# Patient Record
Sex: MALE | Race: BLACK OR AFRICAN AMERICAN | Employment: OTHER | URBAN - METROPOLITAN AREA
[De-identification: names, ages, dates, MRNs, and addresses within clinical notes are randomized per-mention and may not be internally consistent; named-entity substitution may affect disease eponyms.]

---

## 2017-04-21 DIAGNOSIS — R41.3 MEMORY DISTURBANCE: ICD-10-CM

## 2017-04-25 RX ORDER — DONEPEZIL HYDROCHLORIDE 10 MG/1
TABLET, FILM COATED ORAL
Qty: 90 TAB | Refills: 0 | Status: SHIPPED | OUTPATIENT
Start: 2017-04-25 | End: 2017-06-26 | Stop reason: SDUPTHER

## 2017-06-16 DIAGNOSIS — E11.9 TYPE 2 DIABETES MELLITUS WITHOUT COMPLICATION, WITHOUT LONG-TERM CURRENT USE OF INSULIN (HCC): ICD-10-CM

## 2017-06-19 RX ORDER — METFORMIN HYDROCHLORIDE 500 MG/1
TABLET, EXTENDED RELEASE ORAL
Qty: 180 TAB | Refills: 0 | Status: SHIPPED | OUTPATIENT
Start: 2017-06-19 | End: 2017-09-20 | Stop reason: SDUPTHER

## 2017-06-26 ENCOUNTER — OFFICE VISIT (OUTPATIENT)
Dept: FAMILY MEDICINE CLINIC | Age: 82
End: 2017-06-26

## 2017-06-26 VITALS
TEMPERATURE: 98 F | OXYGEN SATURATION: 93 % | WEIGHT: 240.5 LBS | RESPIRATION RATE: 18 BRPM | HEIGHT: 73 IN | SYSTOLIC BLOOD PRESSURE: 112 MMHG | HEART RATE: 68 BPM | DIASTOLIC BLOOD PRESSURE: 79 MMHG | BODY MASS INDEX: 31.87 KG/M2

## 2017-06-26 DIAGNOSIS — E78.00 PURE HYPERCHOLESTEROLEMIA: ICD-10-CM

## 2017-06-26 DIAGNOSIS — E66.9 OBESITY, UNSPECIFIED: ICD-10-CM

## 2017-06-26 DIAGNOSIS — M48.061 SPINAL STENOSIS OF LUMBAR REGION WITH RADICULOPATHY: ICD-10-CM

## 2017-06-26 DIAGNOSIS — M47.12 CERVICAL SPONDYLOSIS WITH MYELOPATHY AND RADICULOPATHY: ICD-10-CM

## 2017-06-26 DIAGNOSIS — R41.3 MEMORY DISTURBANCE: ICD-10-CM

## 2017-06-26 DIAGNOSIS — I25.10 CORONARY ARTERY DISEASE INVOLVING NATIVE CORONARY ARTERY OF NATIVE HEART WITHOUT ANGINA PECTORIS: ICD-10-CM

## 2017-06-26 DIAGNOSIS — Z00.00 MEDICARE ANNUAL WELLNESS VISIT, SUBSEQUENT: Primary | ICD-10-CM

## 2017-06-26 DIAGNOSIS — I10 ESSENTIAL HYPERTENSION: ICD-10-CM

## 2017-06-26 DIAGNOSIS — E11.9 TYPE 2 DIABETES MELLITUS WITHOUT COMPLICATION, WITHOUT LONG-TERM CURRENT USE OF INSULIN (HCC): ICD-10-CM

## 2017-06-26 DIAGNOSIS — Z13.31 SCREENING FOR DEPRESSION: ICD-10-CM

## 2017-06-26 DIAGNOSIS — G44.309 POST-CONCUSSION HEADACHE: ICD-10-CM

## 2017-06-26 DIAGNOSIS — M47.22 CERVICAL SPONDYLOSIS WITH MYELOPATHY AND RADICULOPATHY: ICD-10-CM

## 2017-06-26 DIAGNOSIS — D64.9 ANEMIA, UNSPECIFIED TYPE: ICD-10-CM

## 2017-06-26 DIAGNOSIS — M54.16 SPINAL STENOSIS OF LUMBAR REGION WITH RADICULOPATHY: ICD-10-CM

## 2017-06-26 DIAGNOSIS — E53.8 B12 DEFICIENCY: ICD-10-CM

## 2017-06-26 DIAGNOSIS — E55.9 VITAMIN D DEFICIENCY: ICD-10-CM

## 2017-06-26 DIAGNOSIS — Z13.39 SCREENING FOR ALCOHOLISM: ICD-10-CM

## 2017-06-26 DIAGNOSIS — Z00.00 ROUTINE GENERAL MEDICAL EXAMINATION AT A HEALTH CARE FACILITY: ICD-10-CM

## 2017-06-26 DIAGNOSIS — R51.9 CHRONIC DAILY HEADACHE: ICD-10-CM

## 2017-06-26 RX ORDER — NITROGLYCERIN 0.4 MG/1
TABLET SUBLINGUAL
Qty: 1 BOTTLE | Refills: 3 | Status: CANCELLED | OUTPATIENT
Start: 2017-06-26

## 2017-06-26 RX ORDER — DONEPEZIL HYDROCHLORIDE 10 MG/1
TABLET, FILM COATED ORAL
Qty: 90 TAB | Refills: 0 | Status: SHIPPED | OUTPATIENT
Start: 2017-06-26 | End: 2017-10-20 | Stop reason: SDUPTHER

## 2017-06-26 RX ORDER — FLUOXETINE 10 MG/1
10 CAPSULE ORAL DAILY
Qty: 30 CAP | Refills: 0 | Status: SHIPPED | OUTPATIENT
Start: 2017-06-26 | End: 2017-11-08

## 2017-06-26 NOTE — LETTER
6/27/2017 3:31 PM 
 
Mr. Lisseth [address] [phone] Dear Brunilda Dumont: 
 
Please find your most recent results below. Resulted Orders VITAMIN D, 25 HYDROXY Result Value Ref Range VITAMIN D, 25-HYDROXY 36.6 30.0 - 100.0 ng/mL Comment:  
   Vitamin D deficiency has been defined by the [facility] practice guideline as a 
level of serum 25-OH vitamin D less than 20 ng/mL (1,2). The Endocrine Society went on to further define vitamin D 
insufficiency as a level between 21 and 29 ng/mL (2). 1. IOM ([place] of Medicine). 2010. Dietary reference 
   intakes for calcium and D. [facility]: The 
   [place]. 2. Isma MF, Darrell JAIME, Melisa MORGAN, et al. 
   Evaluation, treatment, and prevention of vitamin D 
   deficiency: an Endocrine Society clinical practice 
   guideline. JCEM. 2011 Jul; 96(7):1911-30. Narrative Performed at:  [address]  [id number] : Yvrose Zavala MD, Phone:  [phone] CBC WITH AUTOMATED DIFF Result Value Ref Range WBC 6.9 3.4 - 10.8 x10E3/uL  
 RBC 4.16 4.14 - 5.80 x10E6/uL HGB 11.3 (L) 12.6 - 17.7 g/dL HCT 36.6 (L) 37.5 - 51.0 % MCV 88 79 - 97 fL  
 MCH 27.2 26.6 - 33.0 pg  
 MCHC 30.9 (L) 31.5 - 35.7 g/dL  
 RDW 14.2 12.3 - 15.4 % PLATELET 945 318 - 614 x10E3/uL NEUTROPHILS 60 % Lymphocytes 29 % MONOCYTES 8 % EOSINOPHILS 3 % BASOPHILS 0 %  
 ABS. NEUTROPHILS 4.1 1.4 - 7.0 x10E3/uL Abs Lymphocytes 2.0 0.7 - 3.1 x10E3/uL  
 ABS. MONOCYTES 0.6 0.1 - 0.9 x10E3/uL  
 ABS. EOSINOPHILS 0.2 0.0 - 0.4 x10E3/uL  
 ABS. BASOPHILS 0.0 0.0 - 0.2 x10E3/uL IMMATURE GRANULOCYTES 0 %  
 ABS. IMM. GRANS. 0.0 0.0 - 0.1 x10E3/uL Narrative Performed at:  [address]  [id number] : Yvrose Zavala MD, Phone:  [phone] TSH 3RD GENERATION Result Value Ref Range TSH 1.370 0.450 - 4.500 uIU/mL Narrative Performed at:  [address]  [id number] : Lindsay Su MD, Phone:  [phone] METABOLIC PANEL, COMPREHENSIVE Result Value Ref Range Glucose 139 (H) 65 - 99 mg/dL BUN 25 8 - 27 mg/dL Creatinine 1.53 (H) 0.76 - 1.27 mg/dL GFR est non-AA 42 (L) >59 mL/min/1.73 GFR est AA 49 (L) >59 mL/min/1.73  
 BUN/Creatinine ratio 16 10 - 24 Sodium 139 134 - 144 mmol/L Potassium 5.4 (H) 3.5 - 5.2 mmol/L Chloride 102 96 - 106 mmol/L  
 CO2 21 18 - 29 mmol/L Calcium 9.3 8.6 - 10.2 mg/dL Protein, total 7.4 6.0 - 8.5 g/dL Albumin 4.4 3.5 - 4.7 g/dL GLOBULIN, TOTAL 3.0 1.5 - 4.5 g/dL A-G Ratio 1.5 1.2 - 2.2 Bilirubin, total 0.3 0.0 - 1.2 mg/dL Alk. phosphatase 80 39 - 117 IU/L  
 AST (SGOT) 18 0 - 40 IU/L  
 ALT (SGPT) 13 0 - 44 IU/L Narrative Performed at:  [address]  [id number] : Lindsay Su MD, Phone:  [phone] LIPID PANEL Result Value Ref Range Cholesterol, total 153 100 - 199 mg/dL Triglyceride 150 (H) 0 - 149 mg/dL HDL Cholesterol 48 >39 mg/dL VLDL, calculated 30 5 - 40 mg/dL LDL, calculated 75 0 - 99 mg/dL Narrative Performed at:  [address]  [id number] : Lindsay Su MD, Phone:  [phone] VITAMIN B12 Result Value Ref Range Vitamin B12 1049 (H) 211 - 946 pg/mL Narrative Performed at:  [address]  [id number] : Lindsay Su MD, Phone:  [phone] CVD REPORT Result Value Ref Range INTERPRETATION NTAP   
 PDF IMAGE Not applicable Narrative Performed at:  [address] [place] [address]  [id number] : Jaspreet Flores PhD, Phone:  [phone] CKD REPORT Result Value Ref Range Interpretation Note Comment:  
   Medical Director's Note: This is an amended report on 
6/27/2017. The following panels were previously not 
reported: Albumin: Creatinine Ratio. Please review this 
report in its entirety, since changes may affect result 
interpretation(s) and/or treatment/follow-up suggestions. Supplement report is available. Narrative Performed at:  [address] [place] [address]  [id number] : Jaspreet Flores PhD, Phone:  [phone] DIABETES PATIENT EDUCATION Result Value Ref Range PDF Image Not applicable Narrative Performed at:  [address] [place] [address]  [id number] : Jaspreet Flores PhD, Phone:  [phone]

## 2017-06-26 NOTE — PROGRESS NOTES
Sees card this month. Needs NTG refilled. Uses once a month per pt. Here for management of chronic medical problems which include AODM controlled by prev A1c, Memory dist on Aricept, CAD followed by card, post concussive HA, mood disorder, HTN, anemia, hyperlip, B12 def, obesity. Visit Vitals    /79 (BP 1 Location: Right arm, BP Patient Position: Sitting)    Pulse 68    Temp 98 °F (36.7 °C) (Oral)    Resp 18    Ht 6' 1\" (1.854 m)    Wt 240 lb 8 oz (109.1 kg)    SpO2 93%    BMI 31.73 kg/m2       Patient alert and cooperative. Reviewed above. Plan:  1. Annual labs ordered. 2. Given refill on Aricept for three months pending neurologist evaluation. 3. Will give one month trial of Prozac 10 mg for possible mood affecting mental status. 4. Follow up with cardiologist to get nitroglycerin reordered. 5. Recheck here in three months for diabetic recheck, follow otherwise prn. Reviewed [address]. Agree with A&P as outlined.

## 2017-06-26 NOTE — LETTER
6/28/2017 12:03 PM 
 
Mr. Bran Pritchard 7 [id number] Dear Zofia Zamora: 
 
Please find your most recent results below. Resulted Orders VITAMIN D, 25 HYDROXY Result Value Ref Range VITAMIN D, 25-HYDROXY 36.6 30.0 - 100.0 ng/mL Comment:  
   Vitamin D deficiency has been defined by the [facility] practice guideline as a 
level of serum 25-OH vitamin D less than 20 ng/mL (1,2). The Endocrine Society went on to further define vitamin D 
insufficiency as a level between 21 and 29 ng/mL (2). 1. IOM ([place] of Medicine). 2010. Dietary reference 
   intakes for calcium and D. 430 [facility]: The 
   [place]. 2. Isma MF, Darrell JAIME, Melisa MORGAN, et al. 
   Evaluation, treatment, and prevention of vitamin D 
   deficiency: an Endocrine Society clinical practice 
   guideline. JCEM. 2011 Jul; 96(7):1911-30. Narrative Performed at:  [address]  [id number] : Chris Robert MD, Phone:  [phone] MICROALBUMIN, UR, RAND W/ MICROALBUMIN/CREA RATIO Result Value Ref Range Creatinine, urine 187.1 Not Estab. mg/dL Microalbumin, urine 6.8 Not Estab. ug/mL Microalb/Creat ratio (ug/mg creat.) 3.6 0.0 - 30.0 mg/g creat Narrative Performed at:  [address]  [id number] : Chris Robert MD, Phone:  [phone] CBC WITH AUTOMATED DIFF Result Value Ref Range WBC 6.9 3.4 - 10.8 x10E3/uL  
 RBC 4.16 4.14 - 5.80 x10E6/uL HGB 11.3 (L) 12.6 - 17.7 g/dL HCT 36.6 (L) 37.5 - 51.0 % MCV 88 79 - 97 fL  
 MCH 27.2 26.6 - 33.0 pg  
 MCHC 30.9 (L) 31.5 - 35.7 g/dL  
 RDW 14.2 12.3 - 15.4 % PLATELET 249 816 - 380 x10E3/uL NEUTROPHILS 60 % Lymphocytes 29 % MONOCYTES 8 % EOSINOPHILS 3 % BASOPHILS 0 %  
 ABS. NEUTROPHILS 4.1 1.4 - 7.0 x10E3/uL Abs Lymphocytes 2.0 0.7 - 3.1 x10E3/uL  
 ABS. MONOCYTES 0.6 0.1 - 0.9 x10E3/uL  
 ABS. EOSINOPHILS 0.2 0.0 - 0.4 x10E3/uL  
 ABS. BASOPHILS 0.0 0.0 - 0.2 x10E3/uL IMMATURE GRANULOCYTES 0 %  
 ABS. IMM. GRANS. 0.0 0.0 - 0.1 x10E3/uL Narrative Performed at:  [address]  [id number] : Yvrose Zavala MD, Phone:  [phone] URINALYSIS W/ RFLX MICROSCOPIC Result Value Ref Range Specific Gravity 1.019 1.005 - 1.030  
 pH (UA) 5.5 5.0 - 7.5 Color Yellow Yellow Appearance Clear Clear Leukocyte Esterase Negative Negative Protein Negative Negative/Trace Glucose Negative Negative Ketone Negative Negative Blood Negative Negative Bilirubin Negative Negative Urobilinogen 0.2 0.2 - 1.0 mg/dL Nitrites Negative Negative Microscopic Examination Comment Comment:  
   Microscopic not indicated and not performed. Narrative Performed at:  [address]  [id number] : Yvrose Zavala MD, Phone:  [phone] TSH 3RD GENERATION Result Value Ref Range TSH 1.370 0.450 - 4.500 uIU/mL Narrative Performed at:  [address]  [id number] : Yvrose Zavala MD, Phone:  [phone] METABOLIC PANEL, COMPREHENSIVE Result Value Ref Range Glucose 139 (H) 65 - 99 mg/dL BUN 25 8 - 27 mg/dL Creatinine 1.53 (H) 0.76 - 1.27 mg/dL GFR est non-AA 42 (L) >59 mL/min/1.73 GFR est AA 49 (L) >59 mL/min/1.73  
 BUN/Creatinine ratio 16 10 - 24 Sodium 139 134 - 144 mmol/L Potassium 5.4 (H) 3.5 - 5.2 mmol/L Chloride 102 96 - 106 mmol/L  
 CO2 21 18 - 29 mmol/L Calcium 9.3 8.6 - 10.2 mg/dL Protein, total 7.4 6.0 - 8.5 g/dL Albumin 4.4 3.5 - 4.7 g/dL GLOBULIN, TOTAL 3.0 1.5 - 4.5 g/dL A-G Ratio 1.5 1.2 - 2.2 Bilirubin, total 0.3 0.0 - 1.2 mg/dL Alk.  phosphatase 80 39 - 117 IU/L  
 AST (SGOT) 18 0 - 40 IU/L  
 ALT (SGPT) 13 0 - 44 IU/L Narrative Performed at:  [address]  [id number] : Johanny Herndon MD, Phone:  [phone] LIPID PANEL Result Value Ref Range Cholesterol, total 153 100 - 199 mg/dL Triglyceride 150 (H) 0 - 149 mg/dL HDL Cholesterol 48 >39 mg/dL VLDL, calculated 30 5 - 40 mg/dL LDL, calculated 75 0 - 99 mg/dL Narrative Performed at:  [address]  [id number] : Johanny Herndon MD, Phone:  [phone] VITAMIN B12 Result Value Ref Range Vitamin B12 1049 (H) 211 - 946 pg/mL Narrative Performed at:  [address]  [id number] : Johanny Herndon MD, Phone:  [phone] CVD REPORT Result Value Ref Range INTERPRETATION NTAP   
 PDF IMAGE Not applicable Narrative Performed at:  [facility] [place]  [id number] : Esther Smith PhD, Phone:  [phone] CKD REPORT Result Value Ref Range Interpretation Note Comment:  
   Medical Director's Note: This is an amended report on 
6/27/2017. The following panels were previously not 
reported: Albumin: Creatinine Ratio. Please review this 
report in its entirety, since changes may affect result 
interpretation(s) and/or treatment/follow-up suggestions. Supplement report is available. Narrative Performed at:  [address] [place] [address]  [id number] : Esther Smith PhD, Phone:  [phone] DIABETES PATIENT EDUCATION Result Value Ref Range PDF Image Not applicable Narrative Performed at:  [address] [place] [address]  [id number] : Esther Smith PhD, Phone:  [phone] DIABETES PATIENT EDUCATION Result Value Ref Range PDF Image Not applicable Narrative Performed at:  [address] [place] [address]  [id number] : Santana Villa PhD, Phone:  [phone]

## 2017-06-26 NOTE — PATIENT INSTRUCTIONS
Medicare Part B Preventive Services Limitations Recommendation Scheduled   Cardiovascular Screening Blood Tests (every 5 years)  Total cholesterol, HDL, Triglycerides Order as a panel if possible Last: 5/25/16    Every Year Ordered today   Diabetes Self-Management Training (DSMT) (no USPSTF recommendation) Requires referral by treating physician for patient with diabetes or renal disease. 10 hours of initial DSMT session of no less than 30 minutes each in a continuous 12-month period. 2 hours of follow-up DSMT in subsequent years. Last: Talk to Dr. Jacqueline Mc if you are interested in a refresher course. Glaucoma Screening (no USPSTF recommendation) Diabetes mellitus, family history, , age 48 or over,  American, age 72 or over Last: 7/18/16    Every year Due July 2017   Seasonal Influenza Vaccination (annually)    Every Fall Please get one this Fall. Shingles Vaccination  A shingles vaccine is recommended once in a lifetime after age 61 Patient thinks he has gotten. Check your paperwork at home from [facility].    Pneumococcal Vaccination (once after 65)  Last:  Pneumovax: 1/1/08 and 10/30/14    Prevnar-13: 10/3014 Completed series

## 2017-06-26 NOTE — PROGRESS NOTES
Chief Complaint   Patient presents with    Follow-up    Medication Evaluation     Aricept does not seem to be helping. Wife wondered if he could have an increase in dosage. Fasting for labs. 1. Have you been to the ER, urgent care clinic since your last visit? Hospitalized since your last visit? No    2. Have you seen or consulted any other health care providers outside of the [address] [place] [address] since your last visit? Include any pap smears or colon screening. Yes When: 3/20/17 Where: [facility] Reason for visit: Lab and questions. 4/17/17 Dr. Nelson Ferguson did hearing test.    The patient was counseled on the dangers of tobacco use, and Patient is a non smoker. Reviewed strategies to maximize success, including Continue not to smoke. I have reviewed Health Maintenance with the patient and updated. Has an Guillermo Martinez. Here on 6/28/17.

## 2017-06-26 NOTE — MR AVS SNAPSHOT
Visit Information Date & Time Provider Department Dept. Phone Encounter #  
 6/26/2017  9:30 AM Gretchen Benjamin MD [facility] [phone] [id number] Follow-up Instructions Return in about 3 months (around 9/26/2017) for Recheck DM. Your Appointments 7/12/2018 10:40 AM  
ESTABLISHED PATIENT with Miriam Pichardo MD  
[facility] ([address]) Appt Note: follow up  
 [address]  [place] [address], [facility] [address] [address] [facility] [address] Upcoming Health Maintenance Date Due ZOSTER VACCINE AGE 60> 10/7/1995 MICROALBUMIN Q1 5/25/2017 LIPID PANEL Q1 5/25/2017 MEDICARE YEARLY EXAM 5/26/2017 EYE EXAM RETINAL OR DILATED Q1 7/26/2017* INFLUENZA AGE 9 TO ADULT 8/1/2017 HEMOGLOBIN A1C Q6M 9/20/2017 FOOT EXAM Q1 12/5/2017 GLAUCOMA SCREENING Q2Y 7/18/2018 DTaP/Tdap/Td series (2 - Td) 4/10/2024 *Topic was postponed. The date shown is not the original due date. Allergies as of 6/26/2017  Review Complete On: 6/26/2017 By: Marti Diamond PHARMD  
 No Known Allergies Current Immunizations  Reviewed on 6/26/2017 Name Date Influenza High Dose Vaccine PF 10/12/2015, 10/15/2014 Influenza Vaccine 10/15/2013 Influenza Vaccine Split 10/1/2012 Pneumococcal Conjugate (PCV-13) 10/30/2014 Pneumococcal Polysaccharide (PPSV-23) 10/1/2012, 1/1/2008 Tdap 4/10/2014 Reviewed by Marti Diamond PHARMD on 6/26/2017 at 10:36 AM  
You Were Diagnosed With   
  
 Codes Comments Medicare annual wellness visit, subsequent    -  Primary ICD-10-CM: Z00.00 ICD-9-CM: V70.0 Type 2 diabetes mellitus without complication, without long-term current use of insulin (HCC)     ICD-10-CM: E11.9 ICD-9-CM: 250.00 Post-concussion headache     ICD-10-CM: G62.577 ICD-9-CM: 339.20 Cervical spondylosis with myelopathy and radiculopathy     ICD-10-CM: M47.12, M47.22 
ICD-9-CM: 721.1 Spinal stenosis of lumbar region with radiculopathy     ICD-10-CM: M48.06, M54.16 
ICD-9-CM: 724.02, 724.4 Chronic daily headache     ICD-10-CM: R51 ICD-9-CM: 784.0 Obesity, unspecified     ICD-10-CM: E66.9 ICD-9-CM: 278.00 Essential hypertension     ICD-10-CM: I10 
ICD-9-CM: 401.9 Anemia, unspecified type     ICD-10-CM: D64.9 ICD-9-CM: 285.9 Pure hypercholesterolemia     ICD-10-CM: E78.00 ICD-9-CM: 272.0 Coronary artery disease involving native coronary artery of native heart without angina pectoris     ICD-10-CM: I25.10 ICD-9-CM: 414.01 Vitamin D deficiency     ICD-10-CM: E55.9 ICD-9-CM: 268.9 B12 deficiency     ICD-10-CM: E53.8 ICD-9-CM: 266.2 Memory disturbance     ICD-10-CM: R41.3 ICD-9-CM: 780.93 Vitals BP Pulse Temp Resp Height(growth percentile) Weight(growth percentile) 112/79 (BP 1 Location: Right arm, BP Patient Position: Sitting) 68 98 °F (36.7 °C) (Oral) 18 6' 1\" (1.854 m) 240 lb 8 oz (109.1 kg) SpO2 BMI Smoking Status 93% 31.73 kg/m2 Former Smoker Vitals History BMI and BSA Data Body Mass Index Body Surface Area 31.73 kg/m 2 2.37 m 2 Preferred Pharmacy Pharmacy Name Phone [facility] [phone] Your Updated Medication List  
  
   
This list is accurate as of: 6/26/17 10:39 AM.  Always use your most recent med list.  
  
  
  
  
 aspirin 81 mg tablet Take 162 mg by mouth daily. Taking 1 tablet daily. cholecalciferol (VITAMIN D3) 5,000 unit Tab tablet Commonly known as:  VITAMIN D3 Take 5,000 Units by mouth daily. donepezil 10 mg tablet Commonly known as:  ARICEPT  
TAKE ONE TABLET BY MOUTH ONCE NIGHTLY FLUoxetine 10 mg capsule Commonly known as:  PROzac  
 Take 1 Cap by mouth daily. glucose blood VI test strips strip Commonly known as:  ONETOUCH ULTRA TEST  
TEST BLOOD SUGAR 2 TO 3 TIMES A WEEK  
  
 lisinopril 5 mg tablet Commonly known as:  PRINIVIL, ZESTRIL  
TAKE ONE TABLET BY MOUTH DAILY FOR KIDNEY PROTECTION  
  
 metFORMIN  mg tablet Commonly known as:  GLUCOPHAGE XR  
TAKE TWO TABLETS BY MOUTH EVERY EVENING WITH SUPPER OR EVERY NIGHT AT BEDTIME  
  
 nitroglycerin 0.4 mg SL tablet Commonly known as:  NITROSTAT  
DISSOLVE 1 TAB UNDER TONGUE FOR CHEST PAIN - IF PAIN REMAINS AFTER 5 MIN, CALL 911 AND REPEAT DOSE. MAX 3 TABS IN 15 MINUTES  
  
 pravastatin 40 mg tablet Commonly known as:  PRAVACHOL  
TAKE ONE TABLET BY MOUTH AT BEDTIME  
  
 VITAMIN B COMPLEX NO.12-NIACIN PO Take 1 Tab by mouth daily. ZyrTEC 10 mg tablet Generic drug:  cetirizine Take 10 mg by mouth daily. Prescriptions Sent to Pharmacy Refills FLUoxetine (PROZAC) 10 mg capsule 0 Sig: Take 1 Cap by mouth daily. Class: Normal  
 Pharmacy: [facility] Ph #: [phone] Route: Oral  
 donepezil (ARICEPT) 10 mg tablet 0 Sig: TAKE ONE TABLET BY MOUTH ONCE NIGHTLY Class: Normal  
 Pharmacy: [facility] Ph #: [phone] We Performed the Following CBC WITH AUTOMATED DIFF [36511 CPT(R)] LIPID PANEL [46805 CPT(R)] METABOLIC PANEL, COMPREHENSIVE [53578 CPT(R)] MICROALBUMIN, UR, RAND W/ MICROALBUMIN/CREA RATIO [id number] CPT(R)] REFERRAL TO NEUROLOGY [UTW69 Custom] Comments:  
 [address] [facility] [address] [facility], [facility] [address] TSH 3RD GENERATION [44735 CPT(R)] URINALYSIS W/ RFLX MICROSCOPIC [58564 CPT(R)] VITAMIN B12 [id number] CPT(R)] VITAMIN D, 25 HYDROXY [id number] CPT(R)] Follow-up Instructions Return in about 3 months (around 9/26/2017) for Recheck DM. Referral Information Referral ID Referred By Referred To  
  
 [id number] MARCELINO, [address] [facility]   [address] [address], [address] [place] [address] Maria Esther Phone: [phone] Fax: [phone] Visits Status Start Date End Date 1 New Request 6/26/17 6/26/18 If your referral has a status of pending review or denied, additional information will be sent to support the outcome of this decision. Patient Instructions Medicare Part B Preventive Services Limitations Recommendation Scheduled Cardiovascular Screening Blood Tests (every 5 years) Total cholesterol, HDL, Triglycerides Order as a panel if possible Last: 5/25/16 Every Year Ordered today Diabetes Self-Management Training (DSMT) (no USPSTF recommendation) Requires referral by treating physician for patient with diabetes or renal disease. 10 hours of initial DSMT session of no less than 30 minutes each in a continuous 12-month period. 2 hours of follow-up DSMT in subsequent years. Last: Talk to Dr. Narayan Pena if you are interested in a refresher course. Glaucoma Screening (no USPSTF recommendation) Diabetes mellitus, family history, , age 48 or over,  American, age 72 or over Last: 7/18/16 Every year Due July 2017 Seasonal Influenza Vaccination (annually) Every Fall Please get one this Fall. Shingles Vaccination  A shingles vaccine is recommended once in a lifetime after age 61 Patient thinks he has gotten. Check your paperwork at home from [facility]. Pneumococcal Vaccination (once after 65)  Last: 
Pneumovax: 1/1/08 and 10/30/14 Prevnar-13: 10/3014 Completed series Introducing [facility]! Evan Tavera introduces [employer] patient portal. Now you can access parts of your medical record, email your doctor's office, and request medication refills online.    
 
1. In your internet browser, go to [email or web address] 2. Click on the First Time User? Click Here link in the Sign In box. You will see the New Member Sign Up page. 3. Enter your [employer] Access Code exactly as it appears below. You will not need to use this code after youve completed the sign-up process. If you do not sign up before the expiration date, you must request a new code. · [employer] Access Code: [email or web address] Expires: 9/24/2017 10:19 AM 
 
4. Enter the last four digits of your Social Security Number (xxxx) and Date of Birth (mm/dd/yyyy) as indicated and click Submit. You will be taken to the next sign-up page. 5. Create a [facility] ID. This will be your [employer] login ID and cannot be changed, so think of one that is secure and easy to remember. 6. Create a [employer] password. You can change your password at any time. 7. Enter your Password Reset Question and Answer. This can be used at a later time if you forget your password. 8. Enter your e-mail address. You will receive e-mail notification when new information is available in [address] 9. Click Sign Up. You can now view and download portions of your medical record. 10. Click the Download Summary menu link to download a portable copy of your medical information. If you have questions, please visit the Frequently Asked Questions section of the [employer] website. Remember, [employer] is NOT to be used for urgent needs. For medical emergencies, dial 911. Now available from your iPhone and Android! Please provide this summary of care documentation to your next provider. Your primary care clinician is listed as [id number] If you have any questions after today's visit, please call [phone].

## 2017-06-26 NOTE — PROGRESS NOTES
Dr. Jacqueline Mc referred [facility], 1935, a 80 y.o. male for a Medicare Annual Wellness Visit (AWV). This is a Subsequent Medicare Annual Wellness Visit providing Personalized Prevention Plan Services (PPPS) (Performed 12 months after initial AWV and PPPS )    I have reviewed the patient's medical history in detail and updated the computerized patient record.      History     Past Medical History:   Diagnosis Date    Advance directive discussed with patient 05/13/2015,05/25/2016    Anemia 6/16/2009    Anemia NEC     Arthritis 6/16/2009    BPH (benign prostatic hyperplasia)     CAD (coronary artery disease)     CAD (coronary artery disease) of artery bypass graft 6/16/2009    dr faye Travis or associate Dr Chika Calderon Carotid stenosis     Chronic pain     back    Concussion syndrome     DM (diabetes mellitus) ([facility] [address]     Edema     Gait disturbance     Glaucoma suspect 12/04/14    [facility] notes [place]    Headache     Headache due to trauma 1/2012    hit by a device on a door    Hearing difficulty     Hypercholesterolemia     Hypertension 6/16/2009    Kidney stone 7/10    dr Kiera styles [facility] lith[facility]ri[facility]    Microalbuminuria 2/2013    Mild memory disturbance Yuliya Dumas also    neuro associates Alisia Mckenna     Nasal septal deviation     Pain in back 6/16/2009    Screening for diabetic retinopathy 7/18/16    exam neg for retinopathy Stiven'/Kaushik    Sleep apnea     Vitamin D deficiency       Past Surgical History:   Procedure Laterality Date    CARDIAC SURG PROCEDURE UNLIST      stent 2008    ENDOSCOPY, COLON, DIAGNOSTIC      in [place]- pt thinks in 2006    HX BACK SURGERY      HX CORONARY STENT PLACEMENT      HX HEART CATHETERIZATION      HX HEENT      left cataract    HX ORTHOPAEDIC      back multiple times    HX OTHER SURGICAL  12/2012    temporal  artery bx    HX OTHER SURGICAL  4/23/14    lower ext doppler    HX OTHER SURGICAL  4/1/16    echo report EF 60%    HX PTCA       Current Outpatient Prescriptions   Medication Sig Dispense Refill    FLUoxetine (PROZAC) 10 mg capsule Take 1 Cap by mouth daily. 30 Cap 0    donepezil (ARICEPT) 10 mg tablet TAKE ONE TABLET BY MOUTH ONCE NIGHTLY 90 Tab 0    metFORMIN ER (GLUCOPHAGE XR) 500 mg tablet TAKE TWO TABLETS BY MOUTH EVERY EVENING WITH SUPPER OR EVERY NIGHT AT BEDTIME 180 Tab 0    lisinopril (PRINIVIL, ZESTRIL) 5 mg tablet TAKE ONE TABLET BY MOUTH DAILY FOR KIDNEY PROTECTION 90 Tab 1    pravastatin (PRAVACHOL) 40 mg tablet TAKE ONE TABLET BY MOUTH AT BEDTIME 90 Tab 1    nitroglycerin (NITROSTAT) 0.4 mg SL tablet DISSOLVE 1 TAB UNDER TONGUE FOR CHEST PAIN - IF PAIN REMAINS AFTER 5 MIN, CALL 911 AND REPEAT DOSE. MAX 3 TABS IN 15 MINUTES 1 Bottle 3    VITAMIN B COMPLEX NO.12-NIACIN PO Take 1 Tab by mouth daily.  glucose blood VI test strips (ONETOUCH ULTRA TEST) strip TEST BLOOD SUGAR 2 TO 3 TIMES A WEEK 50 Strip 5    cholecalciferol, VITAMIN D3, (VITAMIN D3) 5,000 unit tab tablet Take 5,000 Units by mouth daily.  cetirizine (ZYRTEC) 10 mg tablet Take 10 mg by mouth daily.  aspirin 81 mg tablet Take 162 mg by mouth daily. Taking 1 tablet daily.        No Known Allergies  Family History   Problem Relation Age of Onset    Heart Disease Mother     Heart Disease Father     Cancer Sister     Dementia Sister     Cancer Brother      leukemia    Arthritis-osteo Brother     Migraines Brother     Migraines Child     Arthritis-osteo Child      Social History   Substance Use Topics    Smoking status: Former Smoker     Packs/day: 0.50     Years: 30.00     Types: Cigarettes     Quit date: 7/1/2014    Smokeless tobacco: Never Used    Alcohol use No      Comment: occassionally     Patient Active Problem List   Diagnosis Code    Hypertension I10    Arthritis M19.90    Anemia D64.9    Pure hypercholesterolemia E78.00    CAD (coronary artery disease) I25.10    Carotid stenosis, left I65.22    Nocturia R35.1    Hearing loss H91.90    Vitamin D deficiency E55.9    Sleep apnea G47.30    Obesity, unspecified E66.9    Edema of both legs R60.0    Memory disturbance R41.3    Gait disturbance R26.9    Diabetes mellitus (HCC) E11.9    B12 deficiency E53.8    Cerebrovascular disease, unspecified I67.9    Post-concussion headache G44.309    Cervical post-laminectomy syndrome M96.1    Cervical spondylosis with myelopathy and radiculopathy M47.12, M47.22    Spinal stenosis of lumbar region with radiculopathy M48.06, M54.16    Diabetic peripheral neuropathy associated with type 2 diabetes mellitus (HCC) E11.42    History of cerebral infarction Z86.73       Depression Risk Factor Screening:     PHQ over the last two weeks 6/26/2017   Little interest or pleasure in doing things Nearly every day   Feeling down, depressed or hopeless Not at all   Total Score PHQ 2 3   Trouble falling or staying asleep, or sleeping too much Nearly every day   Feeling tired or having little energy Nearly every day   Poor appetite or overeating Nearly every day   Feeling bad about yourself - or that you are a failure or have let yourself or your family down Not at all   Trouble concentrating on things such as school, work, reading or watching TV Nearly every day   Moving or speaking so slowly that other people could have noticed; or the opposite being so fidgety that others notice Nearly every day   Thoughts of being better off dead, or hurting yourself in some way Not at all   PHQ 9 Score 18     Alcohol Risk Factor Screening: On any occasion during the past 3 months, have you had more than 4 drinks containing alcohol? No    Do you average more than 14 drinks per week? No        Functional Ability and Level of Safety:     Hearing Loss   Patient has a diagnosis and is currently seeing a provider. No problems with hearing noted during visit    Activities of Daily Living   Partial assistance.    Requires assistance with: ambulation, uses a chair lift in his house when going up steps. His wife helps him with directions when he is driving, goes shopping with him, takes care of the finances, and administers his medications. ADL Assessment 6/26/2017   Feeding yourself No Help Needed   Getting from bed to chair No Help Needed   Getting dressed No Help Needed   Bathing or showering No Help Needed   Walk across the room (includes cane/walker) No Help Needed   Using the telphone Help Needed   Taking your medications Help Needed   Preparing meals Help Needed   Managing money (expenses/bills) Help Needed   Moderately strenuous housework (laundry) Help Needed   Shopping for personal items (toiletries/medicines) Help Needed   Shopping for groceries Help Needed   Driving Help Needed   Climbing a flight of stairs Help Needed   Getting to places beyond walking distances Help Needed       Fall Risk   Fall Risk Assessment, last 12 mths 6/26/2017   Able to walk? Yes   Fall in past 12 months? No   Fall with injury? -   Number of falls in past 12 months -     Abuse Screen   Patient is not abused    Review of Systems   Not required    Physical Examination     Evaluation of Cognitive Function:  Mood/affect:  happy  Appearance: age appropriate and casually dressed  Family member/caregiver input: Wife was present and added information regarding ADLs and medications. No exam performed today, not required for medicare annual wellness visit.     Patient Care Team:  Josep Simons MD as PCP - Miguel Kumar MD (Cardiology)  Dyan Severance, MD (Ophthalmology)  Brian Childs MD (Otolaryngology)  Yeni Summers DPM as Physician (Podiatry)  Carrie Espinoza, PHD as Physician (Psychology)    Advice/Referrals/Counseling   Education and counseling provided:  Are appropriate based on today's review and evaluation  End-of-Life planning (with patient's consent)  Pneumococcal Vaccine  Influenza Vaccine  Cardiovascular screening blood test  Screening for glaucoma  Diabetes outpatient self-management training services  Shingles Vaccine    Assessment/Plan       ICD-10-CM ICD-9-CM    1. Medicare annual wellness visit, subsequent Z00.00 V70.0    2. Type 2 diabetes mellitus without complication, without long-term current use of insulin (HCC) E11.9 250.00 MICROALBUMIN, UR, RAND W/ MICROALBUMIN/CREA RATIO      URINALYSIS W/ RFLX MICROSCOPIC      TSH 3RD GENERATION      METABOLIC PANEL, COMPREHENSIVE      LIPID PANEL   3. Post-concussion headache G44.309 339.20 REFERRAL TO NEUROLOGY   4. Cervical spondylosis with myelopathy and radiculopathy M47.12 721.1     M47.22     5. Spinal stenosis of lumbar region with radiculopathy M48.06 724.02     M54.16 724.4    6. Chronic daily headache R51 784.0 REFERRAL TO NEUROLOGY   7. Obesity, unspecified E66.9 278.00 TSH 3RD GENERATION      METABOLIC PANEL, COMPREHENSIVE      LIPID PANEL   8. Essential hypertension I10 401.9 MICROALBUMIN, UR, RAND W/ MICROALBUMIN/CREA RATIO      CBC WITH AUTOMATED DIFF      URINALYSIS W/ RFLX MICROSCOPIC      TSH 3RD GENERATION      METABOLIC PANEL, COMPREHENSIVE      LIPID PANEL   9. Anemia, unspecified type D64.9 285.9 CBC WITH AUTOMATED DIFF   10. Pure hypercholesterolemia E78.00 272.0 LIPID PANEL   11. Coronary artery disease involving native coronary artery of native heart without angina pectoris I25.10 414.01 LIPID PANEL   12. Vitamin D deficiency E55.9 268.9 VITAMIN D, 25 HYDROXY   13. B12 deficiency E53.8 266.2 VITAMIN B12   14. Memory disturbance R41.3 780.93 REFERRAL TO NEUROLOGY      FLUoxetine (PROZAC) 10 mg capsule      donepezil (ARICEPT) 10 mg tablet   15. Routine general medical examination at a health care facility Z00.00 V70.0    16. Screening for alcoholism Z13.89 V79.1    17. Screening for depression Z13.89 V79.0 DEPRESSION SCREEN ANNUAL     18.  Lab results and schedule of future lab studies reviewed with patient. Patient getting labs drawn today    19. Reviewed medications and side effects in detail.   Patient's wife brought a list of patient's medications to the visit. During the patient interview,  the following was noted:  Jv [employer] oil:  No longer taking  Cetirizine: patient taking 1-10mg tablet daily  Vit D3:  Patient taking 1-5000 unit tablet daily  Vit B12 complex: Patient taking 1 tablet daily    Screenings (see patient instructions for chart):  Glaucoma screening/Eye exam: Due for repeat in July 2017. DEXA:  Completed in 11/6/14; normal.  Lipid panel: Getting drawn today    Immunizations:  Pneumococcal:  Completed  Influenza:  Recommended that patient receive here or at their pharmacy. Zoster:  Patient's wife thinks he might have had done and will check at home. Recommended that patient receive at their pharmacy if they didn't get from [facility]  Tdap:  Completed. Advanced Care Planning:  Patient has appt next week for [facility]. Patient verbalized understanding of information presented. Answered all of the patient's questions. AVS handed and reviewed with patient.     Bo Jin, PharmD, [facility]

## 2017-06-27 LAB
25(OH)D3+25(OH)D2 SERPL-MCNC: 36.6 NG/ML (ref 30–100)
ALBUMIN SERPL-MCNC: 4.4 G/DL (ref 3.5–4.7)
ALBUMIN/CREAT UR: 3.6 MG/G CREAT (ref 0–30)
ALBUMIN/GLOB SERPL: 1.5 {RATIO} (ref 1.2–2.2)
ALP SERPL-CCNC: 80 IU/L (ref 39–117)
ALT SERPL-CCNC: 13 IU/L (ref 0–44)
APPEARANCE UR: CLEAR
AST SERPL-CCNC: 18 IU/L (ref 0–40)
BASOPHILS # BLD AUTO: 0 X10E3/UL (ref 0–0.2)
BASOPHILS NFR BLD AUTO: 0 %
BILIRUB SERPL-MCNC: 0.3 MG/DL (ref 0–1.2)
BILIRUB UR QL STRIP: NEGATIVE
BUN SERPL-MCNC: 25 MG/DL (ref 8–27)
BUN/CREAT SERPL: 16 (ref 10–24)
CALCIUM SERPL-MCNC: 9.3 MG/DL (ref 8.6–10.2)
CHLORIDE SERPL-SCNC: 102 MMOL/L (ref 96–106)
CHOLEST SERPL-MCNC: 153 MG/DL (ref 100–199)
CO2 SERPL-SCNC: 21 MMOL/L (ref 18–29)
COLOR UR: YELLOW
CREAT SERPL-MCNC: 1.53 MG/DL (ref 0.76–1.27)
CREAT UR-MCNC: 187.1 MG/DL
EOSINOPHIL # BLD AUTO: 0.2 X10E3/UL (ref 0–0.4)
EOSINOPHIL NFR BLD AUTO: 3 %
ERYTHROCYTE [DISTWIDTH] IN BLOOD BY AUTOMATED COUNT: 14.2 % (ref 12.3–15.4)
GLOBULIN SER CALC-MCNC: 3 G/DL (ref 1.5–4.5)
GLUCOSE SERPL-MCNC: 139 MG/DL (ref 65–99)
GLUCOSE UR QL: NEGATIVE
HCT VFR BLD AUTO: 36.6 % (ref 37.5–51)
HDLC SERPL-MCNC: 48 MG/DL
HGB BLD-MCNC: 11.3 G/DL (ref 12.6–17.7)
HGB UR QL STRIP: NEGATIVE
IMM GRANULOCYTES # BLD: 0 X10E3/UL (ref 0–0.1)
IMM GRANULOCYTES NFR BLD: 0 %
INTERPRETATION, 910389: NORMAL
INTERPRETATION: NORMAL
KETONES UR QL STRIP: NEGATIVE
LDLC SERPL CALC-MCNC: 75 MG/DL (ref 0–99)
LEUKOCYTE ESTERASE UR QL STRIP: NEGATIVE
LYMPHOCYTES # BLD AUTO: 2 X10E3/UL (ref 0.7–3.1)
LYMPHOCYTES NFR BLD AUTO: 29 %
Lab: NORMAL
Lab: NORMAL
MCH RBC QN AUTO: 27.2 PG (ref 26.6–33)
MCHC RBC AUTO-ENTMCNC: 30.9 G/DL (ref 31.5–35.7)
MCV RBC AUTO: 88 FL (ref 79–97)
MICRO URNS: NORMAL
MICROALBUMIN UR-MCNC: 6.8 UG/ML
MONOCYTES # BLD AUTO: 0.6 X10E3/UL (ref 0.1–0.9)
MONOCYTES NFR BLD AUTO: 8 %
NEUTROPHILS # BLD AUTO: 4.1 X10E3/UL (ref 1.4–7)
NEUTROPHILS NFR BLD AUTO: 60 %
NITRITE UR QL STRIP: NEGATIVE
PDF IMAGE, 910387: NORMAL
PH UR STRIP: 5.5 [PH] (ref 5–7.5)
PLATELET # BLD AUTO: 255 X10E3/UL (ref 150–379)
POTASSIUM SERPL-SCNC: 5.4 MMOL/L (ref 3.5–5.2)
PROT SERPL-MCNC: 7.4 G/DL (ref 6–8.5)
PROT UR QL STRIP: NEGATIVE
RBC # BLD AUTO: 4.16 X10E6/UL (ref 4.14–5.8)
SODIUM SERPL-SCNC: 139 MMOL/L (ref 134–144)
SP GR UR: 1.02 (ref 1–1.03)
TRIGL SERPL-MCNC: 150 MG/DL (ref 0–149)
TSH SERPL DL<=0.005 MIU/L-ACNC: 1.37 UIU/ML (ref 0.45–4.5)
UROBILINOGEN UR STRIP-MCNC: 0.2 MG/DL (ref 0.2–1)
VIT B12 SERPL-MCNC: 1049 PG/ML (ref 211–946)
VLDLC SERPL CALC-MCNC: 30 MG/DL (ref 5–40)
WBC # BLD AUTO: 6.9 X10E3/UL (ref 3.4–10.8)

## 2017-06-27 NOTE — PROGRESS NOTES
Anemic as before. Kidney fcn down. Set up neph ref. High B12. Decrease supp dose. Rest labs O.K.  Urine studies pending.

## 2017-07-03 NOTE — PROGRESS NOTES
Left message on name ID'd voicemail to call me back. Had left the same message last week but didn't record it.

## 2017-07-19 ENCOUNTER — OFFICE VISIT (OUTPATIENT)
Dept: FAMILY MEDICINE CLINIC | Age: 82
End: 2017-07-19

## 2017-07-19 DIAGNOSIS — Z71.89 ADVANCE CARE PLANNING: Primary | ICD-10-CM

## 2017-07-19 NOTE — ACP (ADVANCE CARE PLANNING)
Advance Care Planning Conversation  First Steps®     Prior to today's conversation, did individual have existing ACP documents? [] Yes  [x] No  If Yes, type of document:   Copy of document in electronic health record? [] Yes  [] No  If no, requested copy of document? [] Yes  [] No    Date of conversation: 7/19/17   Location:[facility]    Participants:   [x] Patient    [x] Prospective agent (not yet named in a document) / Other surrogate decision  maker / next of kin    Name: Ishmael Curtis    Relationship to patient:spouse    Phone number: [phone]   [] Healthcare agent (already designated in prior ACP document)    Name:     Relationship to patient:    Phone number:       Other persons present:   Name:     Relationship to patient:   Name:     Relationship to patient:    Individual's Fears/Concerns about planning:pt unable to express     Goals/Narrative of Conversation:Pt came today with wife for her follow up to complete her AMD and at request of wife to complete his AMD. When I asked pt to state why he ws here today, he stated \"I don't know\". When I asked him if he knew what Advanced care planning was he stated \"not really\". When I asked him if he remembered our conversation at his wife's last visit about her ACP he did not recall our discussion. I told pt that I would need to explain to him some concepts and then have him express him back to me to see if he understood what I was saying. I also explained to his wife that he would need to be able to express back to me in his own words his understanding of what I was saying today. I told him that part of our purpose today was to put in writing who he would want to make healthcare decisions for him if he could not make decisions for himself and who he trusts to do that for him if he can't make decisions for himself. The patient pointed at his wife and said he trusts her and wants her to make decisions.  He also said he had several children but wanted his son, Dayne Astorga (Nick Tomlinson is his nickname) to be the 2nd person. I did not feel that the patient could complete the rest of the AMD form today as it was very difficult to get him to state back even in simple terms his understanding of any teaching beyond the decision of who he felt he wanted to be his healthcare agent. I ended the interview tool and encouraged the wife and patient to talk further together with their son and review the packet of information but could not fill out the form further with the patient today. Conversation topics for Individual    Has learned the following from prior experiences with serious illness:not able to assess    Identifies the following as important for living well:not able to assess    \"What cultural, Baptist, spiritual, or personal beliefs (if any) do you have that might help you choose the care you want, or do not want? \"  Patient response:    Not able to assess    \"Would you like to talk to someone about these beliefs or concerns? \"  Patient response: not able to assess      Conversation topics for Agent / Prospective Agent    Understanding of the role of healthcare agent:yes, wife and son agree to role. Contacted son over the phone today. Agent confirms Willingness to:   [x]Yes []No Accept role   [x] Yes []No Talk with individual about his/her goals, values, & preferences   [x] Yes [] No   Follow individual's decisions, even if do not agree with those decisions   [x]Yes  []No Make decisions in difficult moments    Other questions/concerns about role of agent:none    Topics for Chronic Illness (if applicable):  Not able to assess    \"What do you understand about your (name of illness)? \"  Patient response:    \"What do you understand about the complications that may occur with your (name of illness)? \"  Patient response:    \"What do you understand about CPR? \" Patient response:    \"What outcome would you expect from CPR? \" Patient response:    \"What would be an unacceptable outcome? \" Patient response:        First Steps® ACP Facilitator: Nava Lao RN    Length (minutes): 60    The following was provided (check all that apply):   [x] Clarification of the goals of ACP    [x] Criteria for choosing a healthcare agent   [x] Written information on the planning process      Healthcare Decision Information Cards:   [] Help with Breathing Facts   [] Tube Feeding Facts   [] CPR Facts      [x] Review of the completion of the advance directive    [] Review of existing advance directive       Meeting Outcomes:   [] ACP discussion completed   [x] Advance directive form completed   [x] Original of completed advance directive given to patient   [x] Copy given to healthcare agent    [x] Copy scanned to electronic medical record    If ACP discussion not completed, last interview topic discussed: due to patient's limited capacity, could not complete interview but only healthcare agent part of tool.       Follow-up plan:     [] Schedule follow-up conversation to continue planning   [] Referred individual to provider for additional questions/concerns    [] Individual will invite agent/prospective agent to next ACP appointment   [x] Recommended to review completed AMD annually or upon change in health status     [x] This note routed to one or more involved healthcare providers

## 2017-07-19 NOTE — Clinical Note
Met with pt and wife. Completed AMD healthcare agent part of form only due to patient's limited capacity to answer questions.  Morteza Munoz RN

## 2017-07-21 NOTE — TELEPHONE ENCOUNTER
He was in the office for his [address] end of June and had his labs.  We need to check A1c next time he's here

## 2017-07-24 RX ORDER — LISINOPRIL 5 MG/1
TABLET ORAL
Qty: 90 TAB | Refills: 3 | Status: SHIPPED | OUTPATIENT
Start: 2017-07-24 | End: 2018-07-23 | Stop reason: SDUPTHER

## 2017-09-20 DIAGNOSIS — E11.9 TYPE 2 DIABETES MELLITUS WITHOUT COMPLICATION, WITHOUT LONG-TERM CURRENT USE OF INSULIN (HCC): ICD-10-CM

## 2017-09-20 RX ORDER — METFORMIN HYDROCHLORIDE 500 MG/1
TABLET, EXTENDED RELEASE ORAL
Qty: 180 TAB | Refills: 0 | Status: SHIPPED | OUTPATIENT
Start: 2017-09-20 | End: 2018-03-08 | Stop reason: SDUPTHER

## 2017-10-05 ENCOUNTER — OFFICE VISIT (OUTPATIENT)
Dept: FAMILY MEDICINE CLINIC | Age: 82
End: 2017-10-05

## 2017-10-05 VITALS
OXYGEN SATURATION: 96 % | HEIGHT: 73 IN | BODY MASS INDEX: 31.5 KG/M2 | WEIGHT: 237.7 LBS | HEART RATE: 69 BPM | SYSTOLIC BLOOD PRESSURE: 143 MMHG | TEMPERATURE: 98 F | DIASTOLIC BLOOD PRESSURE: 77 MMHG | RESPIRATION RATE: 20 BRPM

## 2017-10-05 DIAGNOSIS — E11.42 DIABETIC PERIPHERAL NEUROPATHY ASSOCIATED WITH TYPE 2 DIABETES MELLITUS (HCC): ICD-10-CM

## 2017-10-05 DIAGNOSIS — E11.9 TYPE 2 DIABETES MELLITUS WITHOUT COMPLICATION, WITHOUT LONG-TERM CURRENT USE OF INSULIN (HCC): Primary | ICD-10-CM

## 2017-10-05 LAB — HBA1C MFR BLD HPLC: 5.8 %

## 2017-10-05 NOTE — MR AVS SNAPSHOT
Visit Information Date & Time Provider Department Dept. Phone Encounter #  
 10/5/2017 10:40 AM Leila Copeland MD [facility] [phone] [id number] Follow-up Instructions Return in about 8 months (around 6/5/2018) for [address], labs, DM. Your Appointments 7/12/2018 10:40 AM  
ESTABLISHED PATIENT with Catrina Kapoor MD  
[facility] ([facility]) Appt Note: follow up  
 [address]  
[address] [facility],[address] [place] [address] Upcoming Health Maintenance Date Due ZOSTER VACCINE AGE 60> 8/7/1995 EYE EXAM RETINAL OR DILATED Q1 11/5/2017* FOOT EXAM Q1 12/5/2017 HEMOGLOBIN A1C Q6M 4/5/2018 MICROALBUMIN Q1 6/26/2018 LIPID PANEL Q1 6/26/2018 MEDICARE YEARLY EXAM 6/27/2018 GLAUCOMA SCREENING Q2Y 7/18/2018 DTaP/Tdap/Td series (2 - Td) 4/10/2024 *Topic was postponed. The date shown is not the original due date. Allergies as of 10/5/2017  Review Complete On: 10/5/2017 By: Leila Copeland MD  
 No Known Allergies Current Immunizations  Reviewed on 10/5/2017 Name Date Influenza High Dose Vaccine PF 9/28/2017, 10/12/2015, 10/15/2014 Influenza Vaccine 10/15/2013 Influenza Vaccine Split 10/1/2012 Pneumococcal Conjugate (PCV-13) 10/30/2014 Pneumococcal Polysaccharide (PPSV-23) 10/1/2012, 1/1/2008 Tdap 4/10/2014 Reviewed by Tara Peters RN on 10/5/2017 at 11:20 AM  
You Were Diagnosed With   
  
 Codes Comments Type 2 diabetes mellitus without complication, without long-term current use of insulin (HCC)    -  Primary ICD-10-CM: E11.9 ICD-9-CM: 250.00 Diabetic peripheral neuropathy associated with type 2 diabetes mellitus (HCC)     ICD-10-CM: E11.42 
ICD-9-CM: 250.60, 357.2 Vitals BP Pulse Temp Resp Height(growth percentile) Weight(growth percentile) 143/77 (BP 1 Location: Right arm, BP Patient Position: Sitting) 69 98 °F (36.7 °C) (Oral) 20 6' 1\" (1.854 m) 237 lb 11.2 oz (107.8 kg) SpO2 BMI Smoking Status 96% 31.36 kg/m2 Former Smoker BMI and BSA Data Body Mass Index Body Surface Area  
 31.36 kg/m 2 2.36 m 2 Preferred [facility] Phone [facility] [address] [phone] Your Updated Medication List  
  
   
This list is accurate as of: 10/5/17 11:49 AM.  Always use your most recent med list.  
  
  
  
  
 aspirin 81 mg tablet Take 162 mg by mouth daily. Taking 1 tablet daily. cholecalciferol (VITAMIN D3) 5,000 unit Tab tablet Commonly known as:  VITAMIN D3 Take 5,000 Units by mouth daily. donepezil 10 mg tablet Commonly known as:  ARICEPT  
TAKE ONE TABLET BY MOUTH ONCE NIGHTLY FLUoxetine 10 mg capsule Commonly known as:  PROzac Take 1 Cap by mouth daily. glucose blood VI test strips strip Commonly known as:  ONETOUCH ULTRA TEST  
TEST BLOOD SUGAR 2 TO 3 TIMES A WEEK  
  
 lisinopril 5 mg tablet Commonly known as:  PRINIVIL, ZESTRIL  
TAKE ONE TABLET BY MOUTH DAILY FOR KIDNEY PROTECTION  
  
 metFORMIN  mg tablet Commonly known as:  GLUCOPHAGE XR  
TAKE TWO TABLETS BY MOUTH EVERY EVENING WITH SUPPER OR EVERY NIGHT AT BEDTIME  
  
 nitroglycerin 0.4 mg SL tablet Commonly known as:  NITROSTAT  
DISSOLVE 1 TAB UNDER TONGUE FOR CHEST PAIN - IF PAIN REMAINS AFTER 5 MIN, CALL 911 AND REPEAT DOSE. MAX 3 TABS IN 15 MINUTES  
  
 pravastatin 40 mg tablet Commonly known as:  PRAVACHOL  
TAKE ONE TABLET BY MOUTH AT BEDTIME  
  
 VITAMIN B COMPLEX NO.12-NIACIN PO Take 1 Tab by mouth every seven (7) days. ZyrTEC 10 mg tablet Generic drug:  cetirizine Take 10 mg by mouth daily. We Performed the Following AMB POC HEMOGLOBIN A1C [20154 CPT(R)] Follow-up Instructions Return in about 8 months (around 6/5/2018) for [address], labs, DM. Introducing [facility]! Brittney Rice introduces [employer] patient portal. Now you can access parts of your medical record, email your doctor's office, and request medication refills online. 1. In your internet browser, go to [email or web address] 2. Click on the First Time User? Click Here link in the Sign In box. You will see the New Member Sign Up page. 3. Enter your [employer] Access Code exactly as it appears below. You will not need to use this code after youve completed the sign-up process. If you do not sign up before the expiration date, you must request a new code. · [employer] Access Code: [email or web address] Expires: 1/3/2018 11:49 AM 
 
4. Enter the last four digits of your Social Security Number (xxxx) and Date of Birth (mm/dd/yyyy) as indicated and click Submit. You will be taken to the next sign-up page. 5. Create a [employer] ID. This will be your [employer] login ID and cannot be changed, so think of one that is secure and easy to remember. 6. Create a [employer] password. You can change your password at any time. 7. Enter your Password Reset Question and Answer. This can be used at a later time if you forget your password. 8. Enter your e-mail address. You will receive e-mail notification when new information is available in [address] 9. Click Sign Up. You can now view and download portions of your medical record. 10. Click the Download Summary menu link to download a portable copy of your medical information. If you have questions, please visit the Frequently Asked Questions section of the [employer] website. Remember, [employer] is NOT to be used for urgent needs. For medical emergencies, dial 911. Now available from your iPhone and Android! Please provide this summary of care documentation to your next provider. Your primary care clinician is listed as [id number] If you have any questions after today's visit, please call [phone].

## 2017-10-05 NOTE — PROGRESS NOTES
No pbs reported. No refills needed. Needs eye exam and Zostavax. Discussed. Visit Vitals    /77 (BP 1 Location: Right arm, BP Patient Position: Sitting)    Pulse 69    Temp 98 °F (36.7 °C) (Oral)    Resp 20    Ht 6' 1\" (1.854 m)    Wt 237 lb 11.2 oz (107.8 kg)    SpO2 96%    BMI 31.36 kg/m2       Patient alert and cooperative. Reviewed above. Assessment:  1. Diabetes, excellent control. Plan:  1. Recommended could try going to one 500 mg Metformin at supper rather than the two.  2. Return in June for annual labs, wellness visit, diabetic check. 3. Follow otherwise here prn.

## 2017-10-05 NOTE — ASSESSMENT & PLAN NOTE
Stable, based on history, physical exam and review of pertinent labs, studies and medications; meds reconciled; changes to treatment plan as per orders.   Key Antihyperglycemic Medications             metFORMIN ER (GLUCOPHAGE XR) 500 mg tablet  (Taking) TAKE TWO TABLETS BY MOUTH EVERY EVENING WITH SUPPER OR EVERY NIGHT AT BEDTIME        Other Key Diabetic Medications             lisinopril (PRINIVIL, ZESTRIL) 5 mg tablet  (Taking) TAKE ONE TABLET BY MOUTH DAILY FOR KIDNEY PROTECTION    pravastatin (PRAVACHOL) 40 mg tablet  (Taking) TAKE ONE TABLET BY MOUTH AT BEDTIME        Lab Results   Component Value Date/Time    Hemoglobin A1c (POC) 6.5 12/05/2016 09:42 AM    Glucose 139 06/26/2017 11:06 AM    Creatinine 1.53 06/26/2017 11:06 AM    Microalb/Creat ratio (ug/mg creat.) 3.6 06/26/2017 02:18 PM    Cholesterol, total 153 06/26/2017 11:06 AM    HDL Cholesterol 48 06/26/2017 11:06 AM    LDL, calculated 75 06/26/2017 11:06 AM    Triglyceride 150 06/26/2017 11:06 AM     Diabetic Foot and Eye Exam HM Status   Topic Date Due    Eye Exam  11/05/2017 (Originally 7/18/2017)   [address] [place] [address] Diabetic Foot Care  12/05/2017

## 2017-10-05 NOTE — ASSESSMENT & PLAN NOTE
Stable, based on history, physical exam and review of pertinent labs, studies and medications; meds reconciled; continue current treatment plan.   Key Antihyperglycemic Medications             metFORMIN ER (GLUCOPHAGE XR) 500 mg tablet  (Taking) TAKE TWO TABLETS BY MOUTH EVERY EVENING WITH SUPPER OR EVERY NIGHT AT BEDTIME        Other Key Diabetic Medications             lisinopril (PRINIVIL, ZESTRIL) 5 mg tablet  (Taking) TAKE ONE TABLET BY MOUTH DAILY FOR KIDNEY PROTECTION    pravastatin (PRAVACHOL) 40 mg tablet  (Taking) TAKE ONE TABLET BY MOUTH AT BEDTIME        Lab Results   Component Value Date/Time    Hemoglobin A1c (POC) 6.5 12/05/2016 09:42 AM    Glucose 139 06/26/2017 11:06 AM    Creatinine 1.53 06/26/2017 11:06 AM    Microalb/Creat ratio (ug/mg creat.) 3.6 06/26/2017 02:18 PM    Cholesterol, total 153 06/26/2017 11:06 AM    HDL Cholesterol 48 06/26/2017 11:06 AM    LDL, calculated 75 06/26/2017 11:06 AM    Triglyceride 150 06/26/2017 11:06 AM     Diabetic Foot and Eye Exam HM Status   Topic Date Due    Eye Exam  11/05/2017 (Originally 7/18/2017)   [facility] Diabetic Foot Care  12/05/2017

## 2017-10-20 DIAGNOSIS — R41.3 MEMORY DISTURBANCE: ICD-10-CM

## 2017-10-21 DIAGNOSIS — E78.00 PURE HYPERCHOLESTEROLEMIA: ICD-10-CM

## 2017-10-23 RX ORDER — DONEPEZIL HYDROCHLORIDE 10 MG/1
TABLET, FILM COATED ORAL
Qty: 90 TAB | Refills: 2 | Status: SHIPPED | OUTPATIENT
Start: 2017-10-23 | End: 2018-06-07 | Stop reason: SDUPTHER

## 2017-10-23 RX ORDER — PRAVASTATIN SODIUM 40 MG/1
TABLET ORAL
Qty: 90 TAB | Refills: 2 | Status: SHIPPED | OUTPATIENT
Start: 2017-10-23 | End: 2018-07-23 | Stop reason: SDUPTHER

## 2017-11-08 ENCOUNTER — OFFICE VISIT (OUTPATIENT)
Dept: FAMILY MEDICINE CLINIC | Age: 82
End: 2017-11-08

## 2017-11-08 VITALS
OXYGEN SATURATION: 93 % | RESPIRATION RATE: 16 BRPM | SYSTOLIC BLOOD PRESSURE: 112 MMHG | DIASTOLIC BLOOD PRESSURE: 61 MMHG | TEMPERATURE: 97.8 F | WEIGHT: 235.5 LBS | HEIGHT: 73 IN | HEART RATE: 72 BPM | BODY MASS INDEX: 31.21 KG/M2

## 2017-11-08 DIAGNOSIS — E11.42 DIABETIC PERIPHERAL NEUROPATHY ASSOCIATED WITH TYPE 2 DIABETES MELLITUS (HCC): ICD-10-CM

## 2017-11-08 DIAGNOSIS — E11.9 TYPE 2 DIABETES MELLITUS WITHOUT COMPLICATION, WITHOUT LONG-TERM CURRENT USE OF INSULIN (HCC): ICD-10-CM

## 2017-11-08 DIAGNOSIS — E78.00 PURE HYPERCHOLESTEROLEMIA: ICD-10-CM

## 2017-11-08 DIAGNOSIS — R41.3 MEMORY DISTURBANCE: ICD-10-CM

## 2017-11-08 DIAGNOSIS — I25.10 CORONARY ARTERY DISEASE INVOLVING NATIVE CORONARY ARTERY OF NATIVE HEART WITHOUT ANGINA PECTORIS: ICD-10-CM

## 2017-11-08 DIAGNOSIS — Z01.818 PREOP EXAMINATION: Primary | ICD-10-CM

## 2017-11-08 DIAGNOSIS — I10 ESSENTIAL HYPERTENSION: ICD-10-CM

## 2017-11-08 DIAGNOSIS — H25.9 SENILE CATARACT OF RIGHT EYE, UNSPECIFIED AGE-RELATED CATARACT TYPE: ICD-10-CM

## 2017-11-08 NOTE — MR AVS SNAPSHOT
Visit Information Date & Time Provider Department Dept. Phone Encounter #  
 11/8/2017  3:20 PM Fredrick Krueger MD [facility] [phone] Follow-up Instructions Return if symptoms worsen or fail to improve. Follow-up and Disposition History Your Appointments 7/12/2018 10:40 AM  
ESTABLISHED PATIENT with Melanie Seymour MD  
[facility] ([address]) Appt Note: follow up  
 [address]  
[address] [facility] [place] [address] Upcoming Health Maintenance Date Due  
 FOOT EXAM Q1 12/5/2017 ZOSTER VACCINE AGE 60> 2/6/2018* EYE EXAM RETINAL OR DILATED Q1 2/6/2018* HEMOGLOBIN A1C Q6M 4/5/2018 MICROALBUMIN Q1 6/26/2018 LIPID PANEL Q1 6/26/2018 MEDICARE YEARLY EXAM 6/27/2018 GLAUCOMA SCREENING Q2Y 7/18/2018 DTaP/Tdap/Td series (2 - Td) 4/10/2024 *Topic was postponed. The date shown is not the original due date. Allergies as of 11/8/2017  Review Complete On: 11/8/2017 By: Fredrick Krueger MD  
 No Known Allergies Current Immunizations  Reviewed on 10/5/2017 Name Date Influenza High Dose Vaccine PF 9/28/2017, 10/12/2015, 10/15/2014 Influenza Vaccine 10/15/2013 Influenza Vaccine Split 10/1/2012 Pneumococcal Conjugate (PCV-13) 10/30/2014 Pneumococcal Polysaccharide (PPSV-23) 10/1/2012, 1/1/2008 Tdap 4/10/2014 Not reviewed this visit You Were Diagnosed With   
  
 Codes Comments Preop examination    -  Primary ICD-10-CM: H92.007 ICD-9-CM: V72.84 Senile cataract of right eye, unspecified age-related cataract type     ICD-10-CM: H25.9 ICD-9-CM: 366.10 Diabetic peripheral neuropathy associated with type 2 diabetes mellitus (HCC)     ICD-10-CM: E11.42 
ICD-9-CM: 250.60, 357.2  Type 2 diabetes mellitus without complication, without long-term current use of insulin ([facility]     ICD-10-CM: E11.9 ICD-9-CM: 250.00 Memory disturbance     ICD-10-CM: R41.3 ICD-9-CM: 780.93 Essential hypertension     ICD-10-CM: I10 
ICD-9-CM: 401.9 Pure hypercholesterolemia     ICD-10-CM: E78.00 ICD-9-CM: 272.0 Coronary artery disease involving native coronary artery of native heart without angina pectoris     ICD-10-CM: I25.10 ICD-9-CM: 414.01 Vitals BP Pulse Temp Resp Height(growth percentile) Weight(growth percentile) 112/61 (BP 1 Location: Right arm, BP Patient Position: Sitting) 72 97.8 °F (36.6 °C) (Oral) 16 6' 1\" (1.854 m) 235 lb 8 oz (106.8 kg) SpO2 BMI Smoking Status 93% 31.07 kg/m2 Former Smoker Vitals History BMI and BSA Data Body Mass Index Body Surface Area 31.07 kg/m 2 2.35 m 2 Preferred Pharmacy Pharmacy Name Phone Karla Barahona [address] [facility] [phone] Your Updated Medication List  
  
   
This list is accurate as of: 11/8/17  3:57 PM.  Always use your most recent med list.  
  
  
  
  
 aspirin 81 mg tablet Take 162 mg by mouth daily. Taking 1 tablet daily. cholecalciferol (VITAMIN D3) 5,000 unit Tab tablet Commonly known as:  VITAMIN D3 Take 1,000 Units by mouth daily. donepezil 10 mg tablet Commonly known as:  ARICEPT  
TAKE ONE TABLET BY MOUTH EVERY NIGHT AT BEDTIME  
  
 glucose blood VI test strips strip Commonly known as:  ONETOUCH ULTRA TEST  
TEST BLOOD SUGAR 2 TO 3 TIMES A WEEK  
  
 lisinopril 5 mg tablet Commonly known as:  PRINIVIL, ZESTRIL  
TAKE ONE TABLET BY MOUTH DAILY FOR KIDNEY PROTECTION  
  
 metFORMIN  mg tablet Commonly known as:  GLUCOPHAGE XR  
TAKE TWO TABLETS BY MOUTH EVERY EVENING WITH SUPPER OR EVERY NIGHT AT BEDTIME  
  
 nitroglycerin 0.4 mg SL tablet Commonly known as:  NITROSTAT  
DISSOLVE 1 TAB UNDER TONGUE FOR CHEST PAIN - IF PAIN REMAINS AFTER 5 MIN, CALL 911 AND REPEAT DOSE. MAX 3 TABS IN 15 MINUTES  
  
 pravastatin 40 mg tablet Commonly known as:  PRAVACHOL  
TAKE ONE TABLET BY MOUTH EVERY NIGHT AT BEDTIME  
  
 VITAMIN B COMPLEX NO.12-NIACIN PO Take 1 Tab by mouth every seven (7) days. ZyrTEC 10 mg tablet Generic drug:  cetirizine Take 10 mg by mouth daily. Follow-up Instructions Return if symptoms worsen or fail to improve. To-Do List   
 06/05/2018 8:20 AM  
  Appointment with Jackie Skelton MD at [facility] ([phone]) Introducing [facility] SERVICES! [facility] introduces Oncopeptides patient portal. Now you can access parts of your medical record, email your doctor's office, and request medication refills online. 1. In your internet browser, go to [email or web address] 2. Click on the First Time User? Click Here link in the Sign In box. You will see the New Member Sign Up page. 3. Enter your Oncopeptides Access Code exactly as it appears below. You will not need to use this code after youve completed the sign-up process. If you do not sign up before the expiration date, you must request a new code. · Oncopeptides Access Code: [email or web address] Expires: 1/3/2018 10:49 AM 
 
4. Enter the last four digits of your Social Security Number (xxxx) and Date of Birth (mm/dd/yyyy) as indicated and click Submit. You will be taken to the next sign-up page. 5. Create a PeakStreamt ID. This will be your Oncopeptides login ID and cannot be changed, so think of one that is secure and easy to remember. 6. Create a Oncopeptides password. You can change your password at any time. 7. Enter your Password Reset Question and Answer. This can be used at a later time if you forget your password. 8. Enter your e-mail address. You will receive e-mail notification when new information is available in [address] 9. Click Sign Up. You can now view and download portions of your medical record. 10. Click the Download Summary menu link to download a portable copy of your medical information. If you have questions, please visit the Frequently Asked Questions section of the Fanvibe website. Remember, Fanvibe is NOT to be used for urgent needs. For medical emergencies, dial 911. Now available from your iPhone and Android! Please provide this summary of care documentation to your next provider. Your primary care clinician is listed as Osmar Gustafson Dr. If you have any questions after today's visit, please call [phone].

## 2017-11-08 NOTE — PROGRESS NOTES
Chief Complaint   Patient presents with    Pre-op Exam     Right cataract removal on 11/9/17     1. Have you been to the ER, urgent care clinic since your last visit? Hospitalized since your last visit? No    2. Have you seen or consulted any other health care providers outside of the [address] [place] [address] since your last visit? Include any pap smears or colon screening. No       I have reviewed Health Maintenance with the patient and updated. Advance Care plan is on file.

## 2017-11-08 NOTE — ASSESSMENT & PLAN NOTE
Stable, based on history, physical exam and review of pertinent labs, studies and medications; meds reconciled; continue current treatment plan.   Key Antihyperglycemic Medications             metFORMIN ER (GLUCOPHAGE XR) 500 mg tablet  (Taking) TAKE TWO TABLETS BY MOUTH EVERY EVENING WITH SUPPER OR EVERY NIGHT AT BEDTIME        Other Key Diabetic Medications             pravastatin (PRAVACHOL) 40 mg tablet  (Taking) TAKE ONE TABLET BY MOUTH EVERY NIGHT AT BEDTIME    lisinopril (PRINIVIL, ZESTRIL) 5 mg tablet  (Taking) TAKE ONE TABLET BY MOUTH DAILY FOR KIDNEY PROTECTION        Lab Results   Component Value Date/Time    Hemoglobin A1c (POC) 5.8 10/05/2017 11:34 AM    Glucose 139 06/26/2017 11:06 AM    Creatinine 1.53 06/26/2017 11:06 AM    Microalb/Creat ratio (ug/mg creat.) 3.6 06/26/2017 02:18 PM    Cholesterol, total 153 06/26/2017 11:06 AM    HDL Cholesterol 48 06/26/2017 11:06 AM    LDL, calculated 75 06/26/2017 11:06 AM    Triglyceride 150 06/26/2017 11:06 AM     Diabetic Foot and Eye Exam HM Status   Topic Date Due    Diabetic Foot Care  12/05/2017    Eye Exam  02/06/2018 (Originally 7/18/2017)

## 2017-11-15 ENCOUNTER — TELEPHONE (OUTPATIENT)
Dept: FAMILY MEDICINE CLINIC | Age: 82
End: 2017-11-15

## 2017-11-15 DIAGNOSIS — H59.021 CATARACT (LENS) FRAGMENTS IN EYE FOLLOWING CATARACT SURGERY, RIGHT EYE: Primary | ICD-10-CM

## 2017-11-15 NOTE — TELEPHONE ENCOUNTER
----- Message from Link Hernández sent at 11/15/2017 11:15 AM EST -----  Regarding: Dr. Tabitha Lesch / Lesly Hyde, Wife, stated Pt's specialist (Dr. Heather Garcia, [place], [phone]) requested an callback in regards to Pt's condition.     Best contact :[phone]

## 2017-11-16 NOTE — TELEPHONE ENCOUNTER
Dr. Estefanía Baldwin states pt will need posterior approach to fully remove cataract by retinal specialist.  Has appt Monday after Thanksgiving. Still working on ins issues to be [employer] participant.

## 2018-03-08 DIAGNOSIS — E11.9 TYPE 2 DIABETES MELLITUS WITHOUT COMPLICATION, WITHOUT LONG-TERM CURRENT USE OF INSULIN (HCC): ICD-10-CM

## 2018-03-08 NOTE — TELEPHONE ENCOUNTER
PCP: Daina Ventura MD    Last appt: 11/8/2017  Future Appointments  Date Time Provider Josh Hernández   6/5/2018 8:20 AM Daina Ventura MD [facility] [place]   7/12/2018 10:40 AM Jose Khan  [address]       Requested Prescriptions     Pending Prescriptions Disp Refills    metFORMIN ER (GLUCOPHAGE XR) 500 mg tablet 180 Tab 0     Sig: TAKE TWO TABLETS BY MOUTH EVERY EVENING WITH SUPPER OR EVERY NIGHT AT BEDTIME     Lab Results   Component Value Date/Time    Sodium 139 06/26/2017 11:06 AM    Potassium 5.4 (H) 06/26/2017 11:06 AM    Chloride 102 06/26/2017 11:06 AM    CO2 21 06/26/2017 11:06 AM    Anion gap 7 11/03/2010 11:05 AM    Glucose 139 (H) 06/26/2017 11:06 AM    BUN 25 06/26/2017 11:06 AM    Creatinine 1.53 (H) 06/26/2017 11:06 AM    BUN/Creatinine ratio 16 06/26/2017 11:06 AM    GFR est AA 49 (L) 06/26/2017 11:06 AM    GFR est non-AA 42 (L) 06/26/2017 11:06 AM    Calcium 9.3 06/26/2017 11:06 AM     Lab Results   Component Value Date/Time    Hemoglobin A1c 6.7 (H) 07/09/2013 09:33 AM    Hemoglobin A1c (POC) 5.8 10/05/2017 11:34 AM     Lab Results   Component Value Date/Time    Cholesterol, total 153 06/26/2017 11:06 AM    HDL Cholesterol 48 06/26/2017 11:06 AM    LDL, calculated 75 06/26/2017 11:06 AM    VLDL, calculated 30 06/26/2017 11:06 AM    Triglyceride 150 (H) 06/26/2017 11:06 AM    CHOL/HDL Ratio 2.6 11/03/2010 11:05 AM     Lab Results   Component Value Date/Time    TSH 1.370 06/26/2017 11:06 AM

## 2018-03-12 RX ORDER — METFORMIN HYDROCHLORIDE 500 MG/1
TABLET, EXTENDED RELEASE ORAL
Qty: 180 TAB | Refills: 0 | Status: SHIPPED | OUTPATIENT
Start: 2018-03-12 | End: 2018-06-15 | Stop reason: SDUPTHER

## 2018-06-07 DIAGNOSIS — R41.3 MEMORY DISTURBANCE: ICD-10-CM

## 2018-06-11 RX ORDER — DONEPEZIL HYDROCHLORIDE 10 MG/1
TABLET, FILM COATED ORAL
Qty: 90 TAB | Refills: 0 | Status: SHIPPED | OUTPATIENT
Start: 2018-06-11 | End: 2018-10-01 | Stop reason: SDUPTHER

## 2018-07-12 ENCOUNTER — OFFICE VISIT (OUTPATIENT)
Dept: CARDIOLOGY CLINIC | Age: 83
End: 2018-07-12

## 2018-07-12 DIAGNOSIS — I25.10 CORONARY ARTERY DISEASE INVOLVING NATIVE CORONARY ARTERY OF NATIVE HEART WITHOUT ANGINA PECTORIS: Primary | ICD-10-CM

## 2018-07-16 ENCOUNTER — OFFICE VISIT (OUTPATIENT)
Dept: FAMILY MEDICINE CLINIC | Age: 83
End: 2018-07-16

## 2018-07-16 VITALS
SYSTOLIC BLOOD PRESSURE: 129 MMHG | HEART RATE: 93 BPM | RESPIRATION RATE: 18 BRPM | WEIGHT: 222 LBS | OXYGEN SATURATION: 98 % | BODY MASS INDEX: 29.42 KG/M2 | HEIGHT: 73 IN | DIASTOLIC BLOOD PRESSURE: 65 MMHG | TEMPERATURE: 96.9 F

## 2018-07-16 DIAGNOSIS — I25.10 CORONARY ARTERY DISEASE INVOLVING NATIVE CORONARY ARTERY OF NATIVE HEART WITHOUT ANGINA PECTORIS: ICD-10-CM

## 2018-07-16 DIAGNOSIS — F39 MOOD DISORDER (HCC): ICD-10-CM

## 2018-07-16 DIAGNOSIS — R41.3 MEMORY DISTURBANCE: ICD-10-CM

## 2018-07-16 DIAGNOSIS — E78.00 PURE HYPERCHOLESTEROLEMIA: ICD-10-CM

## 2018-07-16 DIAGNOSIS — E53.8 B12 DEFICIENCY: ICD-10-CM

## 2018-07-16 DIAGNOSIS — E11.9 TYPE 2 DIABETES MELLITUS WITHOUT COMPLICATION, WITHOUT LONG-TERM CURRENT USE OF INSULIN (HCC): Primary | ICD-10-CM

## 2018-07-16 DIAGNOSIS — I10 ESSENTIAL HYPERTENSION: ICD-10-CM

## 2018-07-16 DIAGNOSIS — E55.9 VITAMIN D DEFICIENCY: ICD-10-CM

## 2018-07-16 DIAGNOSIS — E11.42 DIABETIC PERIPHERAL NEUROPATHY ASSOCIATED WITH TYPE 2 DIABETES MELLITUS (HCC): ICD-10-CM

## 2018-07-16 DIAGNOSIS — D64.9 ANEMIA, UNSPECIFIED TYPE: ICD-10-CM

## 2018-07-16 DIAGNOSIS — G44.309 POST-CONCUSSION HEADACHE: ICD-10-CM

## 2018-07-16 LAB — HBA1C MFR BLD HPLC: 6.8 %

## 2018-07-16 RX ORDER — FLUOXETINE 10 MG/1
10 TABLET ORAL DAILY
COMMUNITY
End: 2019-02-25 | Stop reason: ALTCHOICE

## 2018-07-16 NOTE — PROGRESS NOTES
Identified pt with two pt identifiers(name and ). Reviewed record in preparation for visit and have obtained necessary documentation. Chief Complaint   Patient presents with    Diabetes     (Rm #1) f/u; HM foot, A1c, microalbumin due        Health Maintenance Due   Topic    ZOSTER VACCINE AGE 60>     FOOT EXAM Q1     HEMOGLOBIN A1C Q6M     MICROALBUMIN Q1     LIPID PANEL Q1     MEDICARE YEARLY EXAM        Coordination of Care Questionnaire:  :   1) Have you been to an emergency room, urgent care clinic since your last visit? no   Hospitalized since your last visit? no             2. Have seen or consulted any other health care provider since your last visit? YES  If yes, where when, and reason for visit? 18: Dr. Rayshawn Lanier (Cardiac)    3) Do you have an Advanced Directive/ Living Will in place? YES  If yes, do we have a copy on file YES  If no, would you like information NO    Patient is accompanied by spouse I have received verbal consent from Avani Orellana to discuss any/all medical information while they are present in the room.     Results for orders placed or performed in visit on 18   AMB POC HEMOGLOBIN A1C   Result Value Ref Range    Hemoglobin A1c (POC) 6.8 %

## 2018-07-16 NOTE — MR AVS SNAPSHOT
Brendon Tovar 
 
 
 [address] [facility] 
[address] [facility] [address] 
[phone] Patient: Mushtaq Chiang MRN: [id number] :1935 Visit Information Date & Time Provider Department Dept. Phone Encounter #  
 2018 11:20 AM Amrit Patel MD [facility] [phone] [id number] Follow-up Instructions Return in about 6 months (around 2019) for Recheck DM. Follow-up and Disposition History Upcoming Health Maintenance Date Due ZOSTER VACCINE AGE 60> 1995 FOOT EXAM Q1 2017 HEMOGLOBIN A1C Q6M 2018 MICROALBUMIN Q1 2018 LIPID PANEL Q1 2018 MEDICARE YEARLY EXAM 2018 Influenza Age 5 to Adult 2018 EYE EXAM RETINAL OR DILATED Q1 2018 GLAUCOMA SCREENING Q2Y 2019 DTaP/Tdap/Td series (2 - Td) 4/10/2024 Allergies as of 2018  Review Complete On: 2018 By: Amrit Patel MD  
 No Known Allergies Current Immunizations  Reviewed on 10/5/2017 Name Date Influenza High Dose Vaccine PF 2017, 10/12/2015, 10/15/2014 Influenza Vaccine 10/15/2013 Influenza Vaccine Split 10/1/2012 Pneumococcal Conjugate (PCV-13) 10/30/2014 Pneumococcal Polysaccharide (PPSV-23) 10/1/2012, 2008 Tdap 4/10/2014 Not reviewed this visit You Were Diagnosed With   
  
 Codes Comments Type 2 diabetes mellitus without complication, without long-term current use of insulin (HCC)    -  Primary ICD-10-CM: E11.9 ICD-9-CM: 250.00   
 B12 deficiency     ICD-10-CM: E53.8 ICD-9-CM: 266.2 Vitamin D deficiency     ICD-10-CM: E55.9 ICD-9-CM: 268.9 Coronary artery disease involving native coronary artery of native heart without angina pectoris     ICD-10-CM: I25.10 ICD-9-CM: 414.01   
 Pure hypercholesterolemia     ICD-10-CM: E78.00 ICD-9-CM: 272.0 Essential hypertension     ICD-10-CM: I10 
ICD-9-CM: 401.9 Anemia, unspecified type     ICD-10-CM: D64.9 ICD-9-CM: 285.9 Diabetic peripheral neuropathy associated with type 2 diabetes mellitus (HCC)     ICD-10-CM: E11.42 
ICD-9-CM: 250.60, 357.2 Post-concussion headache     ICD-10-CM: T40.214 ICD-9-CM: 339.20 Memory disturbance     ICD-10-CM: R41.3 ICD-9-CM: 780.93 Mood disorder ([facility]     ICD-10-CM: F39 
ICD-9-CM: 296.90 Vitals BP Pulse Temp Resp Height(growth percentile) Weight(growth percentile) 129/65 93 96.9 °F (36.1 °C) (Oral) 18 6' 1\" (1.854 m) 222 lb (100.7 kg) SpO2 BMI Smoking Status 98% 29.29 kg/m2 Former Smoker Vitals History BMI and BSA Data Body Mass Index Body Surface Area  
 29.29 kg/m 2 2.28 m 2 Preferred Pharmacy Pharmacy Name Phone Manuel Rene [address] [facility] [phone] Your Updated Medication List  
  
   
This list is accurate as of 7/16/18 12:20 PM.  Always use your most recent med list.  
  
  
  
  
 aspirin 81 mg tablet Take 162 mg by mouth daily. Taking 1 tablet daily. cholecalciferol (VITAMIN D3) 5,000 unit Tab tablet Commonly known as:  VITAMIN D3 Take 1,000 Units by mouth daily. donepezil 10 mg tablet Commonly known as:  ARICEPT  
TAKE ONE TABLET BY MOUTH EVERY NIGHT AT BEDTIME FLUoxetine 10 mg tablet Commonly known as:  PROzac Take 10 mg by mouth daily. glucose blood VI test strips strip Commonly known as:  ONETOUCH ULTRA TEST  
TEST BLOOD SUGAR 2 TO 3 TIMES A WEEK  
  
 lisinopril 5 mg tablet Commonly known as:  PRINIVIL, ZESTRIL  
TAKE ONE TABLET BY MOUTH DAILY FOR KIDNEY PROTECTION  
  
 metFORMIN  mg tablet Commonly known as:  GLUCOPHAGE XR  
TAKE TWO TABLETS BY MOUTH EVERY EVENING WITH SUPPER OR EVERY NIGHT AT BEDTIME  
  
 nitroglycerin 0.4 mg SL tablet Commonly known as:  NITROSTAT DISSOLVE 1 TAB UNDER TONGUE FOR CHEST PAIN - IF PAIN REMAINS AFTER 5 MIN, CALL 911 AND REPEAT DOSE. MAX 3 TABS IN 15 MINUTES  
  
 pravastatin 40 mg tablet Commonly known as:  PRAVACHOL  
TAKE ONE TABLET BY MOUTH EVERY NIGHT AT BEDTIME  
  
 VITAMIN B COMPLEX NO.12-NIACIN PO Take 1 Tab by mouth every seven (7) days. ZyrTEC 10 mg tablet Generic drug:  cetirizine Take 10 mg by mouth daily. We Performed the Following AMB POC HEMOGLOBIN A1C [81346 CPT(R)] CBC WITH AUTOMATED DIFF [31332 CPT(R)]  DIABETES FOOT EXAM [HM7 Custom] METABOLIC PANEL, COMPREHENSIVE [67422 CPT(R)] MICROALBUMIN, UR, RAND W/ MICROALB/CREAT RATIO [id number] CPT(R)] TSH 3RD GENERATION [43980 CPT(R)] URINALYSIS W/ RFLX MICROSCOPIC [47473 CPT(R)] VITAMIN B12 [id number] CPT(R)] VITAMIN D, 25 HYDROXY [id number] CPT(R)] Follow-up Instructions Return in about 6 months (around 1/16/2019) for Recheck DM. Patient Instructions Results for orders placed or performed in visit on 07/16/18 AMB POC HEMOGLOBIN A1C Result Value Ref Range Hemoglobin A1c (POC) 6.8 % Introducing [facility] HE[facility] SERVICES! Brittney Rice introduces [employer]lyfe patient portal. Now you can access parts of your medical record, email your doctor's office, and request medication refills online. 1. In your internet browser, go to [email or web address] 2. Click on the First Time User? Click Here link in the Sign In box. You will see the New Member Sign Up page. 3. Enter your Adlyfe Access Code exactly as it appears below. You will not need to use this code after youve completed the sign-up process. If you do not sign up before the expiration date, you must request a new code. · Adlyfe Access Code: [email or web address] Expires: 10/14/2018 11:54 AM 
 
4. Enter the last four digits of your Social Security Number (xxxx) and Date of Birth (mm/dd/yyyy) as indicated and click Submit.  You will be taken to the next sign-up page. 5. Create a MyCadbox ID. This will be your MyCadbox login ID and cannot be changed, so think of one that is secure and easy to remember. 6. Create a MyCadbox password. You can change your password at any time. 7. Enter your Password Reset Question and Answer. This can be used at a later time if you forget your password. 8. Enter your e-mail address. You will receive e-mail notification when new information is available in [address] 9. Click Sign Up. You can now view and download portions of your medical record. 10. Click the Download Summary menu link to download a portable copy of your medical information. If you have questions, please visit the Frequently Asked Questions section of the MyCadbox website. Remember, MyCadbox is NOT to be used for urgent needs. For medical emergencies, dial 911. Now available from your iPhone and Android! Please provide this summary of care documentation to your next provider. Your primary care clinician is listed as [id number] If you have any questions after today's visit, please call [phone].

## 2018-07-16 NOTE — PATIENT INSTRUCTIONS
Results for orders placed or performed in visit on 07/16/18   AMB POC HEMOGLOBIN A1C   Result Value Ref Range    Hemoglobin A1c (POC) 6.8 %

## 2018-07-16 NOTE — PROGRESS NOTES
Pt here for E&M of CMPs which include DM with polyneuropathy, chronic HAs post remote concussion, mood disorder on chronic Prozac, no longer obese by today's weight, unable to do much exercise, mild memory disturbance on Aricept, wife does most of ADLs. Sees card for CAD. CareMore form completed. Visit Vitals    /65    Pulse 93    Temp 96.9 °F (36.1 °C) (Oral)    Resp 18    Ht 6' 1\" (1.854 m)    Wt 222 lb (100.7 kg)    SpO2 98%    BMI 29.29 kg/m2       Patient alert and cooperative. Reviewed above. Foot exam below. Diabetic foot exam performed by Juan Burnett MD     Measurement  Response Nurse Comment Physician Comment   Monofilament  R - absent sensation with micro filament  L - absent sensation with micro filament     Pulse DP R - 2+ (normal)  L - 2+ (normal)     Pulse TP R - absent  L - absent     Structural deformity R - None  L - None     Skin Integrity / Deformity R - None  L - None        Reviewed by:             Assessment:  1. AODM. 2. B12 deficiency. 3. Vitamin D deficiency. 4. CAD. 5. Hypercholesterol. 6. HTN. 7. Anemia. 8. Polyneuropathy. 9. Postconcussive headache syndrome. 10. Memory disturbance. 11. Mood disorder. Plan:  1. Check annual labs except lipids as not fasting. 2. Return in six months for diabetic check, follow otherwise prn.

## 2018-07-18 ENCOUNTER — PATIENT OUTREACH (OUTPATIENT)
Dept: FAMILY MEDICINE CLINIC | Age: 83
End: 2018-07-18

## 2018-07-18 NOTE — PROGRESS NOTES
NN Note    [facility] 2018 Patient Annual Health Assessment Form received. Copy of 07/16/18 Encounter note printed & submitted with form.

## 2018-07-25 ENCOUNTER — TELEPHONE (OUTPATIENT)
Dept: CARDIOLOGY CLINIC | Age: 83
End: 2018-07-25

## 2018-07-25 NOTE — TELEPHONE ENCOUNTER
Verified patient with two types of identifiers. Wife called to check to see if her  had an appt with Dr Db Coello tomorrow along with hers. Advised her that I did not have him down for an appt tomorrow and that he missed his last appt. He was last seen 9/2016. Advised her that I was not able to work him in tomorrow but that I could schedule an appt with Dr Db Coello. Scheduled f/u with Dr Db Coello.

## 2018-09-06 ENCOUNTER — OFFICE VISIT (OUTPATIENT)
Dept: CARDIOLOGY CLINIC | Age: 83
End: 2018-09-06

## 2018-09-06 DIAGNOSIS — I25.10 CORONARY ARTERY DISEASE INVOLVING NATIVE CORONARY ARTERY OF NATIVE HEART WITHOUT ANGINA PECTORIS: Primary | ICD-10-CM

## 2018-09-07 NOTE — PROGRESS NOTES
He was sitting in clinic today with his wife, on schedule but not checked in He and she did not mention during her visit today so reschedule his appt so they can both come next time together

## 2018-11-16 LAB
ALBUMIN/CREAT UR: 11.5 MG/G CREAT (ref 0–30)
BASOPHILS # BLD AUTO: 0 X10E3/UL (ref 0–0.2)
BASOPHILS NFR BLD AUTO: 0 %
CREAT UR-MCNC: 144.4 MG/DL
EOSINOPHIL # BLD AUTO: 0.3 X10E3/UL (ref 0–0.4)
EOSINOPHIL NFR BLD AUTO: 5 %
ERYTHROCYTE [DISTWIDTH] IN BLOOD BY AUTOMATED COUNT: 13.9 % (ref 12.3–15.4)
HCT VFR BLD AUTO: 36 % (ref 37.5–51)
HGB BLD-MCNC: 11.2 G/DL (ref 13–17.7)
IMM GRANULOCYTES # BLD: 0 X10E3/UL (ref 0–0.1)
IMM GRANULOCYTES NFR BLD: 0 %
LYMPHOCYTES # BLD AUTO: 2.5 X10E3/UL (ref 0.7–3.1)
LYMPHOCYTES NFR BLD AUTO: 36 %
MCH RBC QN AUTO: 27.5 PG (ref 26.6–33)
MCHC RBC AUTO-ENTMCNC: 31.1 G/DL (ref 31.5–35.7)
MCV RBC AUTO: 89 FL (ref 79–97)
MICROALBUMIN UR-MCNC: 16.6 UG/ML
MONOCYTES # BLD AUTO: 0.5 X10E3/UL (ref 0.1–0.9)
MONOCYTES NFR BLD AUTO: 7 %
NEUTROPHILS # BLD AUTO: 3.6 X10E3/UL (ref 1.4–7)
NEUTROPHILS NFR BLD AUTO: 52 %
PLATELET # BLD AUTO: 280 X10E3/UL (ref 150–379)
RBC # BLD AUTO: 4.07 X10E6/UL (ref 4.14–5.8)
TSH SERPL DL<=0.005 MIU/L-ACNC: 0.97 UIU/ML (ref 0.45–4.5)
VIT B12 SERPL-MCNC: 477 PG/ML (ref 232–1245)
WBC # BLD AUTO: 7 X10E3/UL (ref 3.4–10.8)

## 2018-11-20 NOTE — PROGRESS NOTES
Writer called patient to notify of lab results from Dr. Norman Ernandez. Patient did not answer. Writer left  requesting phone call back.

## 2018-11-20 NOTE — PROGRESS NOTES
Writer called home number on file. Writer spoke with wife, Kari Snow. Verified on PHI form. Wife verified patient's . Wife notified of results from Dr. Shahrzad Gregg. Wife verbalized understanding and appreciation.

## 2019-01-01 RX ORDER — METFORMIN HYDROCHLORIDE 500 MG/1
1000 TABLET, FILM COATED, EXTENDED RELEASE ORAL EVERY EVENING
Qty: 90 TAB | Refills: 1 | Status: SHIPPED | OUTPATIENT
Start: 2019-01-01 | End: 2020-01-01

## 2019-01-01 RX ORDER — LISINOPRIL 2.5 MG/1
2.5 TABLET ORAL DAILY
Qty: 30 TAB | Refills: 2 | Status: SHIPPED | OUTPATIENT
Start: 2019-01-01 | End: 2020-01-01

## 2019-02-21 DIAGNOSIS — E11.9 TYPE 2 DIABETES MELLITUS WITHOUT COMPLICATION, WITHOUT LONG-TERM CURRENT USE OF INSULIN (HCC): ICD-10-CM

## 2019-02-21 RX ORDER — METFORMIN HYDROCHLORIDE 500 MG/1
TABLET, EXTENDED RELEASE ORAL
Qty: 180 TAB | Refills: 0 | Status: SHIPPED | OUTPATIENT
Start: 2019-02-21 | End: 2019-02-25 | Stop reason: SDUPTHER

## 2019-02-25 ENCOUNTER — OFFICE VISIT (OUTPATIENT)
Dept: FAMILY MEDICINE CLINIC | Age: 84
End: 2019-02-25

## 2019-02-25 VITALS
SYSTOLIC BLOOD PRESSURE: 123 MMHG | WEIGHT: 243.1 LBS | DIASTOLIC BLOOD PRESSURE: 65 MMHG | HEART RATE: 70 BPM | RESPIRATION RATE: 20 BRPM | HEIGHT: 69 IN | OXYGEN SATURATION: 95 % | TEMPERATURE: 96 F | BODY MASS INDEX: 36.01 KG/M2

## 2019-02-25 DIAGNOSIS — E11.9 TYPE 2 DIABETES MELLITUS WITHOUT COMPLICATION, WITHOUT LONG-TERM CURRENT USE OF INSULIN (HCC): ICD-10-CM

## 2019-02-25 DIAGNOSIS — Z71.89 ADVANCED DIRECTIVES, COUNSELING/DISCUSSION: ICD-10-CM

## 2019-02-25 DIAGNOSIS — R41.3 MEMORY DISTURBANCE: ICD-10-CM

## 2019-02-25 DIAGNOSIS — Z00.00 MEDICARE ANNUAL WELLNESS VISIT, SUBSEQUENT: Primary | ICD-10-CM

## 2019-02-25 PROBLEM — E66.01 SEVERE OBESITY (HCC): Status: ACTIVE | Noted: 2019-02-25

## 2019-02-25 LAB — HBA1C MFR BLD HPLC: 6.8 %

## 2019-02-25 RX ORDER — METFORMIN HYDROCHLORIDE 500 MG/1
TABLET, EXTENDED RELEASE ORAL
Qty: 180 TAB | Refills: 0 | Status: SHIPPED | OUTPATIENT
Start: 2019-02-25 | End: 2019-06-25 | Stop reason: SDUPTHER

## 2019-02-25 NOTE — ACP (ADVANCE CARE PLANNING)
In the event something were to happen to you and you were unable to speak on your behalf, do you have an Advance Directive/ Living Will in place stating your wishes? YES If yes, do we have a copy on file YES Patient's wife states that no changes have been made to the ACP on file

## 2019-02-25 NOTE — PROGRESS NOTES
This is the Subsequent Medicare Annual Wellness Exam, performed 12 months or more after the Initial AWV or the last Subsequent AWV I have reviewed the patient's medical history in detail and updated the computerized patient record. Eye doctor 2-5 yrs. Dentist prn. Full dentures. Card 2 x annually. [facility] 1-2 x annually. Pod annually. Neuro 2 x annually. No signif hx past yr. History Past Medical History:  
Diagnosis Date  Advance directive discussed with patient 05/13/2015,05/25/2016  Anemia 6/16/2009  Anemia NEC  Arthritis 6/16/2009  BPH (benign prostatic hyperplasia)  CAD (coronary artery disease)  CAD (coronary artery disease) of artery bypass graft 6/16/2009  
 dr faye Teixeira or associate Dr Fatmata Espinoza  Carotid stenosis  Chronic pain   
 back  Concussion syndrome  DM (diabetes mellitus) ([facility] [address]  Edema  Gait disturbance  Glaucoma suspect 12/04/14 [facility] notes [facility]  Headache due to trauma 1/2012  
 hit by a device on a door  Headache(784.0)  Hearing difficulty  Hypercholesterolemia  Hypertension 6/16/2009  Kidney stone 7/10  
 dr Denise styles [facility] lithotripsy  Microalbuminuria 2/2013  Mild memory disturbance Carl Gifford also  
 neuro associates Alisia Mckenna  Nasal septal deviation  Pain in back 6/16/2009  Screening for diabetic retinopathy 7/18/16  
 exam neg for retinopathy Stiven[facility]Faulkner  Sleep apnea  Vitamin D deficiency Past Surgical History:  
Procedure Laterality Date  CARDIAC SURG PROCEDURE UNLIST    
 stent 2008  ENDOSCOPY, COLON, DIAGNOSTIC    
 in [place]- pt thinks in 2006  HX BACK SURGERY    
 HX CORONARY STENT PLACEMENT    
 HX HEART CATHETERIZATION    
 HX HEENT    
 left cataract  HX ORTHOPAEDIC    
 back multiple times  HX OTHER SURGICAL  12/2012  
 temporal  artery bx  HX OTHER SURGICAL  4/23/14  
 lower ext doppler  HX OTHER SURGICAL  4/1/16 echo report EF 60%  HX PTCA Current Outpatient Medications Medication Sig Dispense Refill  varicella-zoster recombinant, PF, (SHINGRIX, PF,) 50 mcg/0.5 mL susr injection 0.5 mL by IntraMUSCular route once for 1 dose. 0.5 mL 1  
 metFORMIN ER (GLUCOPHAGE XR) 500 mg tablet TAKE TWO TABLETS BY MOUTH EVERY EVENING WITH SUPPER OR EVERY NIGHT AT BEDTIME 180 Tab 0  
 donepezil (ARICEPT) 10 mg tablet TAKE ONE TABLET BY MOUTH EVERY NIGHT AT BEDTIME 30 Tab 0  
 lisinopril (PRINIVIL, ZESTRIL) 5 mg tablet TAKE ONE TABLET BY MOUTH DAILY FOR KIDNEY PROTECTION 30 Tab 0  pravastatin (PRAVACHOL) 40 mg tablet TAKE ONE TABLET BY MOUTH EVERY NIGHT AT BEDTIME 30 Tab 0  
 VITAMIN B COMPLEX NO.12-NIACIN PO Take 1 Tab by mouth every seven (7) days.  glucose blood VI test strips (ONETOUCH ULTRA TEST) strip TEST BLOOD SUGAR 2 TO 3 TIMES A WEEK 50 Strip 5  cholecalciferol, VITAMIN D3, (VITAMIN D3) 5,000 unit tab tablet Take 1,000 Units by mouth daily.  cetirizine (ZYRTEC) 10 mg tablet Take 10 mg by mouth daily.  FLUoxetine (PROZAC) 10 mg tablet Take 10 mg by mouth daily.  nitroglycerin (NITROSTAT) 0.4 mg SL tablet DISSOLVE 1 TAB UNDER TONGUE FOR CHEST PAIN - IF PAIN REMAINS AFTER 5 MIN, CALL 911 AND REPEAT DOSE. MAX 3 TABS IN 15 MINUTES 1 Bottle 3  
 aspirin 81 mg tablet Take 162 mg by mouth daily. Taking 1 tablet daily. No Known Allergies Family History Problem Relation Age of Onset  Heart Disease Mother  Heart Disease Father  Cancer Sister  Dementia Sister  Cancer Brother   
     leukemia  Arthritis-osteo Brother  Migraines Brother  Migraines Child  Arthritis-osteo Child Social History Tobacco Use  Smoking status: Former Smoker Packs/day: 0.50 Years: 30.00 Pack years: 15.00 Types: Cigarettes Last attempt to quit: 2014 Years since quittin.6  Smokeless tobacco: Never Used Substance Use Topics  Alcohol use: No  
  Comment: occassionally Patient Active Problem List  
Diagnosis Code  Hypertension I10  
 Arthritis M19.90  Anemia D64.9  Pure hypercholesterolemia E78.00  CAD (coronary artery disease) I25.10  Carotid stenosis, left I65.22  
 Nocturia R35.1  Hearing loss H91.90  Vitamin D deficiency E55.9  Sleep apnea G47.30  Edema of both legs R60.0  Memory disturbance R41.3  Gait disturbance R26.9  Diabetes mellitus ([facility] [address] E11.9  
 B12 deficiency E53.8  Cerebrovascular disease, unspecified I67.9  Post-concussion headache G44.309  Cervical post-laminectomy syndrome M96.1  Cervical spondylosis with myelopathy and radiculopathy M47.12, M47.22  Spinal stenosis of lumbar region with radiculopathy M48.061, M54.16  
 Diabetic peripheral neuropathy associated with type 2 diabetes mellitus ([facility] [address] E11.42  
 History of cerebral infarction Z86.73  
 Mood disorder (HCC) F39  
 Severe obesity ([facility] [address] E66.01 Depression Risk Factor Screening:  
 
3 most recent PHQ Screens 2/25/2019 Little interest or pleasure in doing things Not at all Feeling down, depressed, irritable, or hopeless Not at all Total Score PHQ 2 0 Trouble falling or staying asleep, or sleeping too much - Feeling tired or having little energy - Poor appetite, weight loss, or overeating - Feeling bad about yourself - or that you are a failure or have let yourself or your family down - Trouble concentrating on things such as school, work, reading, or watching TV - Moving or speaking so slowly that other people could have noticed; or the opposite being so fidgety that others notice - Thoughts of being better off dead, or hurting yourself in some way -  
PHQ 9 Score - Alcohol Risk Factor Screening: You do not drink alcohol or very rarely. Functional Ability and Level of Safety:  
Hearing Loss Hearing is good. The patient wears hearing aids. Activities of Daily Living The home contains: handrails, grab bars and rugs Patient needs help with:  phone, transportation, shopping, preparing meals, laundry, housework, managing medications and managing money Fall Risk Fall Risk Assessment, last 12 mths 2/25/2019 Able to walk? Yes Fall in past 12 months? No  
Fall with injury? -  
Number of falls in past 12 months -  
 
 
Abuse Screen Patient is not abused Cognitive Screening Evaluation of Cognitive Function: 
Has your family/caregiver stated any concerns about your memory: yes Abnormal 
 
Patient Care Team  
Patient Care Team: 
Waleska Siu MD as PCP - Vincent Honeycutt MD (Cardiology) Yovany Montenegro MD (Ophthalmology) Monico Faith MD (Otolaryngology) Kieran Arreaga DPM as Physician (Podiatry) Ivonne Tillman, PHD as Physician (Psychology) Assessment/Plan Education and counseling provided: 
Are appropriate based on today's review and evaluation End-of-Life planning (with patient's consent) Pneumococcal Vaccine Influenza Vaccine Cardiovascular screening blood test 
Screening for glaucoma Diabetes screening test 
 
Diagnoses and all orders for this visit: 
 
1. Medicare annual wellness visit, subsequent 2. Type 2 diabetes mellitus without complication, without long-term current use of insulin ([address] -     AMB POC HEMOGLOBIN A1C 
-     metFORMIN ER (GLUCOPHAGE XR) 500 mg tablet; TAKE TWO TABLETS BY MOUTH EVERY EVENING WITH SUPPER OR EVERY NIGHT AT BEDTIME 3. Advanced directives, counseling/discussion 4. Memory disturbance Other orders 
-     varicella-zoster recombinant, PF, (SHINGRIX, PF,) 50 mcg/0.5 mL susr injection; 0.5 mL by IntraMUSCular route once for 1 dose. Health Maintenance Due Topic Date Due  Shingrix Vaccine Age 50> (1 of 2) 10/07/1985  MEDICARE YEARLY EXAM  06/27/2018  HEMOGLOBIN A1C Q6M  01/16/2019

## 2019-02-25 NOTE — PATIENT INSTRUCTIONS
Medicare Wellness Visit, Male The best way to live healthy is to have a lifestyle where you eat a well-balanced diet, exercise regularly, limit alcohol use, and quit all forms of tobacco/nicotine, if applicable. Regular preventive services are another way to keep healthy. Preventive services (vaccines, screening tests, monitoring & exams) can help personalize your care plan, which helps you manage your own care. Screening tests can find health problems at the earliest stages, when they are easiest to treat. [employer] follows the current, evidence-based guidelines published by the [facility] Guillermo Michelle ([facility]) when recommending preventive services for our patients. Because we follow these guidelines, sometimes recommendations change over time as research supports it. (For example, a prostate screening blood test is no longer routinely recommended for men with no symptoms.) Of course, you and your doctor may decide to screen more often for some diseases, based on your risk and co-morbidities (chronic disease you are already diagnosed with). Preventive services for you include: - Medicare offers their members a free annual wellness visit, which is time for you and your primary care provider to discuss and plan for your preventive service needs. Take advantage of this benefit every year! 
-All adults over age 72 should receive the recommended pneumonia vaccines. Current USPSTF guidelines recommend a series of two vaccines for the best pneumonia protection.  
-All adults should have a flu vaccine yearly and an ECG.  All adults age 61 and older should receive a shingles vaccine once in their lifetime.   
-All adults age 38-68 who are overweight should have a diabetes screening test once every three years.  
-Other screening tests & preventive services for persons with diabetes include: an eye exam to screen for diabetic retinopathy, a kidney function test, a foot exam, and stricter control over your cholesterol.  
-Cardiovascular screening for adults with routine risk involves an electrocardiogram (ECG) at intervals determined by the provider.  
-Colorectal cancer screening should be done for adults age 54-65 with no increased risk factors for colorectal cancer. There are a number of acceptable methods of screening for this type of cancer. Each test has its own benefits and drawbacks. Discuss with your provider what is most appropriate for you during your annual wellness visit. The different tests include: colonoscopy (considered the best screening method), a fecal occult blood test, a fecal DNA test, and sigmoidoscopy. 
-All adults born between [facility] should be screened once for Hepatitis C. 
-An Abdominal Aortic Aneurysm (AAA) Screening is recommended for men age 73-68 who has ever smoked in their lifetime. Here is a list of your current Health Maintenance items (your personalized list of preventive services) with a due date: 
Health Maintenance Due Topic Date Due  Shingles Vaccine (1 of 2) 10/07/1985  Cholesterol Test   06/26/2018 [facility] Annual Well Visit  06/27/2018  Hemoglobin A1C    01/16/2019

## 2019-02-25 NOTE — ACP (ADVANCE CARE PLANNING)
Advance Care Planning Advance Care Planning (ACP) Provider [facility] Date of ACP Conversation: 02/25/19 Persons included in Conversation:  patient and family Length of ACP Conversation in minutes:  <16 minutes (Non-Billable) Authorized Decision Maker (if patient is incapable of making informed decisions): This person is: Other Legally Authorized Decision Maker (e.g. Next of Kin) Favian Alvarado For Patients with Decision Making Capacity:  
Values/Goals: Exploration of values, goals, and preferences if recovery is not expected, even with continued medical treatment in the event of:  Imminent death Severe, permanent brain injury Conversation Outcomes / Follow-Up Plan:  
Reviewed existing Advance Directive

## 2019-02-25 NOTE — PROGRESS NOTES
Sarah Craig  Identified pt with two pt identifiers(name and ). Chief Complaint Patient presents with [facility] Annual Wellness Visit  Diabetes 1. Have you been to the ER, urgent care clinic since your last visit? Hospitalized since your last visit? No 
 
2. Have you seen or consulted any other health care providers outside of the [address] [place] [address] since your last visit? Include any pap smears or colon screening. No 
 
 
Would you like to sign up for MyChart today, if you have not already done so? No 
If not, would you like information on MyChart, and how to sign up at a later time? No 
 
 
Medication reconciliation up to date and corrected with patient at this time. Today's provider has been notified of reason for visit, vitals and flowsheets obtained on patients. Reviewed record in preparation for visit, huddled with provider and have obtained necessary documentation. Health Maintenance Due Topic  Shingrix Vaccine Age 50> (1 of 2)  LIPID PANEL Q1   
 MEDICARE YEARLY EXAM   
 HEMOGLOBIN A1C Q6M Wt Readings from Last 3 Encounters:  
19 243 lb 1.6 oz (110.3 kg) 18 222 lb (100.7 kg) 17 235 lb 8 oz (106.8 kg) Temp Readings from Last 3 Encounters:  
19 96 °F (35.6 °C) (Oral) 18 96.9 °F (36.1 °C) (Oral) 17 97.8 °F (36.6 °C) (Oral) BP Readings from Last 3 Encounters:  
19 123/65  
18 129/65  
17 112/61 Pulse Readings from Last 3 Encounters:  
19 70  
18 93  
17 72 Vitals:  
 19 1153 BP: 123/65 Pulse: 70 Resp: 20 Temp: 96 °F (35.6 °C) TempSrc: Oral  
SpO2: 95% Weight: 243 lb 1.6 oz (110.3 kg) Height: 5' 9\" (1.753 m) PainSc:   0 - No pain Learning Assessment: 
:  
 
Learning Assessment 2015 PRIMARY LEARNER Patient [address] CAREGIVER Yes CO-LEARNER NAME wife PRIMARY LANGUAGE ENGLISH  
 LEARNER PREFERENCE PRIMARY LISTENING  
  OTHER (COMMENT) DEMONSTRATION  
ANSWERED BY wife RELATIONSHIP SELF Depression Screening: 
:  
 
3 most recent PHQ Screens 2/25/2019 Little interest or pleasure in doing things Not at all Feeling down, depressed, irritable, or hopeless Not at all Total Score PHQ 2 0 Trouble falling or staying asleep, or sleeping too much - Feeling tired or having little energy - Poor appetite, weight loss, or overeating - Feeling bad about yourself - or that you are a failure or have let yourself or your family down - Trouble concentrating on things such as school, work, reading, or watching TV - Moving or speaking so slowly that other people could have noticed; or the opposite being so fidgety that others notice - Thoughts of being better off dead, or hurting yourself in some way -  
PHQ 9 Score - Fall Risk Assessment: 
:  
 
Fall Risk Assessment, last 12 mths 2/25/2019 Able to walk? Yes Fall in past 12 months? No  
Fall with injury? -  
Number of falls in past 12 months -  
 
 
Abuse Screening: 
:  
 
Abuse Screening Questionnaire 2/25/2019 7/16/2018 11/8/2017 Do you ever feel afraid of your partner? N N N Are you in a relationship with someone who physically or mentally threatens you? N N N Is it safe for you to go home? [address]  
 
 
ADL Screening: 
:  
 
ADL Assessment 2/25/2019 Feeding yourself No Help Needed Getting from bed to chair No Help Needed Getting dressed No Help Needed Bathing or showering No Help Needed Walk across the room (includes cane/walker) Help Needed Using the telphone No Help Needed Taking your medications No Help Needed Preparing meals No Help Needed Managing money (expenses/bills) No Help Needed Moderately strenuous housework (laundry) No Help Needed Shopping for personal items (toiletries/medicines) No Help Needed Shopping for groceries No Help Needed Driving Help Needed Climbing a flight of stairs Help Needed Getting to places beyond walking distances Help Needed

## 2019-03-14 ENCOUNTER — OFFICE VISIT (OUTPATIENT)
Dept: CARDIOLOGY CLINIC | Age: 84
End: 2019-03-14

## 2019-03-14 VITALS
HEART RATE: 72 BPM | BODY MASS INDEX: 36.29 KG/M2 | OXYGEN SATURATION: 96 % | SYSTOLIC BLOOD PRESSURE: 118 MMHG | WEIGHT: 245 LBS | RESPIRATION RATE: 14 BRPM | DIASTOLIC BLOOD PRESSURE: 68 MMHG | HEIGHT: 69 IN

## 2019-03-14 DIAGNOSIS — I25.10 CORONARY ARTERY DISEASE INVOLVING NATIVE CORONARY ARTERY OF NATIVE HEART WITHOUT ANGINA PECTORIS: Primary | ICD-10-CM

## 2019-03-14 DIAGNOSIS — I10 ESSENTIAL HYPERTENSION: ICD-10-CM

## 2019-03-14 DIAGNOSIS — R41.3 MEMORY DISTURBANCE: ICD-10-CM

## 2019-03-14 DIAGNOSIS — E66.01 SEVERE OBESITY (HCC): ICD-10-CM

## 2019-03-14 NOTE — PROGRESS NOTES
Cardiac Electrophysiology OFFICE Note     Subjective: Emily Cuellar is a 80 y.o. patient who is seen for follow up of CAD & HTN. He denies any further chest pain recently. BP well controlled today, 118/68. Tolerating medications well. Diabetes has been well controlled recently. Wife states that behavior/mentation has declined, that it appears to be difficult for him to carry on conversations. Reports that dementia is more noticeable. She is feeling some caregiver strain. She would like for him to see Dr. Patel again regarding his dementia. Previous:  Echo (04/01/2016): LVEF 60-65%, LV wall thickness normal with mild basilar septal thickening. Aortic sclerosis. Trivial TR. Lexiscan cardiolite stress (11/2014): PACs, incomplete RBBB. Diaphragmatic attenuation artifact, more on rest than stress. Inferior wall abnormal, demonstrates small size, mild intensity defect seen on basal inferior wall on rest images only. Carotid duplex (2014): Left 50-79%, right 10-49%. Head trauma years ago. 2 children, one in [facility] 9 one in [place].     Problem List  Date Reviewed: 3/14/2019          Codes Class Noted    Severe obesity ([facility] ICD-10-CM: E66.01  ICD-9-CM: 278.01  2/25/2019        Mood disorder ([facility] ICD-10-CM: F39  ICD-9-CM: 296.90  7/16/2018        Cervical post-laminectomy syndrome ICD-10-CM: M96.1  ICD-9-CM: 722.81  10/6/2015        Cervical spondylosis with myelopathy and radiculopathy ICD-10-CM: M47.12, M47.22  ICD-9-CM: 721.1  10/6/2015        Spinal stenosis of lumbar region with radiculopathy ICD-10-CM: M48.061, M54.16  ICD-9-CM: 724.02, 724.4  10/6/2015        Diabetic peripheral neuropathy associated with type 2 diabetes mellitus ([facility] ICD-10-CM: E11.42  ICD-9-CM: 250.60, 357.2  10/6/2015        History of cerebral infarction ICD-10-CM: Z86.73  ICD-9-CM: V12.54  10/6/2015        B12 deficiency ICD-10-CM: E53.8  ICD-9-CM: 266.2  7/13/2015        Cerebrovascular disease, unspecified ICD-10-CM: I67.9  ICD-9-CM: 437.9  7/13/2015        Post-concussion headache ICD-10-CM: Y99.782  ICD-9-CM: 339.20  7/13/2015        Diabetes mellitus ([facility] ICD-10-CM: E11.9  ICD-9-CM: 250.00  7/10/2013        Carotid stenosis, left ICD-10-CM: I65.22  ICD-9-CM: 433.10  2/13/2013        Nocturia ICD-10-CM: R35.1  ICD-9-CM: 788.43  2/13/2013        Hearing loss ICD-10-CM: H91.90  ICD-9-CM: 389.9  2/13/2013        Vitamin D deficiency ICD-10-CM: E55.9  ICD-9-CM: 268.9  2/13/2013        Sleep apnea ICD-10-CM: G47.30  ICD-9-CM: 780.57  2/13/2013        Edema of both legs ICD-10-CM: R60.0  ICD-9-CM: 782.3  2/13/2013        Memory disturbance ICD-10-CM: R41.3  ICD-9-CM: 780.93  2/13/2013        Gait disturbance ICD-10-CM: R26.9  ICD-9-CM: 781.2  2/13/2013        CAD (coronary artery disease) ICD-10-CM: I25.10  ICD-9-CM: 414.00  Unknown    Overview Addendum 11/30/2011  4:51 PM by Delfin Tello MD     Medtronic stent in Lcx 12/2008 in [place], [place]    Dobutamine cardiolite 11/2011 normal perfusion, EF 53%               Pure hypercholesterolemia ICD-10-CM: E78.00  ICD-9-CM: 272.0  12/7/2009        Hypertension ICD-10-CM: I10  ICD-9-CM: 401.9  6/16/2009        Arthritis ICD-10-CM: M19.90  ICD-9-CM: 716.90  6/16/2009        Anemia ICD-10-CM: D64.9  ICD-9-CM: 285.9  6/16/2009              Current Outpatient Medications   Medication Sig Dispense Refill    metFORMIN ER (GLUCOPHAGE XR) 500 mg tablet TAKE TWO TABLETS BY MOUTH EVERY EVENING WITH SUPPER OR EVERY NIGHT AT BEDTIME 180 Tab 0    donepezil (ARICEPT) 10 mg tablet TAKE ONE TABLET BY MOUTH EVERY NIGHT AT BEDTIME 30 Tab 0    lisinopril (PRINIVIL, ZESTRIL) 5 mg tablet TAKE ONE TABLET BY MOUTH DAILY FOR KIDNEY PROTECTION 30 Tab 0    pravastatin (PRAVACHOL) 40 mg tablet TAKE ONE TABLET BY MOUTH EVERY NIGHT AT BEDTIME 30 Tab 0    VITAMIN B COMPLEX NO.12-NIACIN PO Take 1 Tab by mouth every seven (7) days.       glucose blood VI test strips (ONETOUCH ULTRA TEST) strip TEST BLOOD SUGAR 2 TO 3 TIMES A WEEK 50 Strip 5    cholecalciferol, VITAMIN D3, (VITAMIN D3) 5,000 unit tab tablet Take 1,000 Units by mouth daily.  cetirizine (ZYRTEC) 10 mg tablet Take 10 mg by mouth daily.        No Known Allergies  Past Medical History:   Diagnosis Date    Advance directive discussed with patient 05/13/2015,05/25/2016    Anemia 6/16/2009    Anemia NEC     Arthritis 6/16/2009    BPH (benign prostatic hyperplasia)     CAD (coronary artery disease)     CAD (coronary artery disease) of artery bypass graft 6/16/2009    dr faye Cortez or associate Dr Lee Lipoma Carotid stenosis     Chronic pain     back    Concussion syndrome     DM (diabetes mellitus) ([facility]     Edema     Gait disturbance     Glaucoma suspect 12/04/14    VEI notes Kolleen Potter    Headache due to trauma 1/2012    hit by a device on a door    Headache(784.0)     Hearing difficulty     Hypercholesterolemia     Hypertension 6/16/2009    Kidney stone 7/10    [facility] [place] styles [facility]    Microalbuminuria 2/2013    Mild memory disturbance Haritha Lucas also    neuro associates Alisia Mckenna     Nasal septal deviation     Pain in back 6/16/2009    Screening for diabetic retinopathy 7/18/16    exam neg for retinopathy Stiven'/Kaushik    Sleep apnea     Vitamin D deficiency      Past Surgical History:   Procedure Laterality Date    CARDIAC SURG PROCEDURE UNLIST      stent 2008    ENDOSCOPY, COLON, DIAGNOSTIC      in [place]- pt thinks in 2006    HX BACK SURGERY      HX CORONARY STENT PLACEMENT      HX HEART CATHETERIZATION      HX HEENT      left cataract    HX ORTHOPAEDIC      back multiple times    HX OTHER SURGICAL  12/2012    temporal  artery bx    HX OTHER SURGICAL  4/23/14    lower ext doppler    HX OTHER SURGICAL  4/1/16    echo report EF 60%    HX PTCA       Family History   Problem Relation Age of Onset    Heart Disease Mother     Heart Disease Father     Cancer Sister     Dementia Sister  Cancer Brother         leukemia    Arthritis-osteo Brother     Migraines Brother     Migraines Child     Arthritis-osteo Child      Social History     Tobacco Use    Smoking status: Former Smoker     Packs/day: 0.50     Years: 30.00     Pack years: 15.00     Types: Cigarettes     Last attempt to quit: 2014     Years since quittin.7    Smokeless tobacco: Never Used   Substance Use Topics    Alcohol use: No     Comment: occassionally        Review of Systems:   Constitutional: Negative for fever, chills, weight loss, malaise/fatigue. HEENT: Negative for nosebleeds, vision changes. Respiratory: Negative for cough, hemoptysis  Cardiovascular: Negative for chest pain, palpitations, orthopnea, claudication, leg swelling, syncope, and PND. Gastrointestinal: Negative for nausea, vomiting, diarrhea, blood in stool and melena. Genitourinary: Negative for dysuria, and hematuria. Musculoskeletal: Negative for myalgias, + back arthralgia. Skin: Negative for rash. Heme: Does not bleed or bruise easily. Neurological: Negative for speech change and focal weakness. Psychological: Dementia, short term memory deficit. Objective:     Visit Vitals  /68 (BP 1 Location: Left arm, BP Patient Position: Sitting)   Pulse 72   Resp 14   Ht 5' 9\" (1.753 m)   Wt 245 lb (111.1 kg)   SpO2 96%   BMI 36.18 kg/m²      Physical Exam:   Constitutional: well-developed and well-nourished. No respiratory distress. Head: Normocephalic and atraumatic. Eyes: Pupils are equal, round  ENT: Hearing grossly normal  Neck: Supple. No JVD present. Cardiovascular: Normal rate, regular rhythm. Exam reveals no gallop and no friction rub. No murmur heard. Pulmonary/Chest: Effort normal and breath sounds normal. No wheezes. Abdominal: Soft, no tenderness. Obese. Musculoskeletal: no edema. Neurological: alert,oriented. Skin: Skin is warm and dry  Psychiatric: Pleasant. Some short term memory deficit. Assessment/Plan:       ICD-10-CM ICD-9-CM    1. Coronary artery disease involving native coronary artery of native heart without angina pectoris I25.10 414.01    2. Essential hypertension I10 401.9    3. Severe obesity ([address] E66.01 278.01    4. Memory disturbance R41.3 780.93      Mr. Sandeep Fuchs denies any angina associated with his CAD. He has had no recurrence of cardiac symptoms. Lipids managed by Dr. Scarlet Gomez   nitroglycerin on hand prn. BP well controlled today, 118/68. Continue lisinopril. Follow up in 1 year, sooner for new/worsening issues. His wife wanted him to be reevaluated for dementia and she mentioned Dr. Gregg Hernandez, but I explained to her that she needs to discuss with Dr. Scarlet Gomez, his primary care physician, in regard to the referral process. Mrs. Sandeep Fuchs will speak to Dr. Scarlet Gomez regarding follow up with Dr. Gregg Hernandez for dementia. Future Appointments   Date Time Provider Josh Hernández   8/6/2019 10:20 AM Jose Armando Perez MD [facility] SCHED   3/19/2020  2:30 PM PACEMAKER3, [address]   3/19/2020  2:40 PM Vilma Vazquez  [address]         Thank you for involving me in this patient's care and please call with further concerns or questions. Chula Norwood M.D.   Electrophysiology/Cardiology  [facility] [place]  [address], [place] [address], [address]  [address]                             [address] [place] [address]  [phone]

## 2019-03-14 NOTE — PROGRESS NOTES
Cardiac Electrophysiology OFFICE Note     Subjective: Kerline Griffith is a 80 y.o. patient who is seen for follow up of CAD & HTN. He denies any further chest pain recently. BP well controlled today, 118/68. Tolerating medications well. Diabetes has been well controlled recently. Wife states that behavior/mentation has declined, that it appears to be difficult for him to carry on conversations. Reports that dementia is more noticeable. She is feeling some caregiver strain. She would like for him to see Dr. Sugey Singer again regarding his dementia. Previous:  Echo (04/01/2016): LVEF 60-65%, LV wall thickness normal with mild basilar septal thickening. Aortic sclerosis. Trivial TR. Lexiscan cardiolite stress (11/2014): PACs, incomplete RBBB. Diaphragmatic attenuation artifact, more on rest than stress. Inferior wall abnormal, demonstrates small size, mild intensity defect seen on basal inferior wall on rest images only. Carotid duplex (2014): Left 50-79%, right 10-49%. Head trauma years ago. 2 children, one in [facility] 9 one in [place].     Problem List  Date Reviewed: 3/14/2019          Codes Class Noted    Severe obesity ([facility] ICD-10-CM: E66.01  ICD-9-CM: 278.01  2/25/2019        Mood disorder ([facility] ICD-10-CM: F39  ICD-9-CM: 296.90  7/16/2018        Cervical post-laminectomy syndrome ICD-10-CM: M96.1  ICD-9-CM: 722.81  10/6/2015        Cervical spondylosis with myelopathy and radiculopathy ICD-10-CM: M47.12, M47.22  ICD-9-CM: 721.1  10/6/2015        Spinal stenosis of lumbar region with radiculopathy ICD-10-CM: M48.061, M54.16  ICD-9-CM: 724.02, 724.4  10/6/2015        Diabetic peripheral neuropathy associated with type 2 diabetes mellitus ([facility] ICD-10-CM: E11.42  ICD-9-CM: 250.60, 357.2  10/6/2015        History of cerebral infarction ICD-10-CM: Z86.73  ICD-9-CM: V12.54  10/6/2015        B12 deficiency ICD-10-CM: E53.8  ICD-9-CM: 266.2  7/13/2015        Cerebrovascular disease, unspecified ICD-10-CM: I67.9  ICD-9-CM: 437.9  7/13/2015        Post-concussion headache ICD-10-CM: Y57.999  ICD-9-CM: 339.20  7/13/2015        Diabetes mellitus ([facility] ICD-10-CM: E11.9  ICD-9-CM: 250.00  7/10/2013        Carotid stenosis, left ICD-10-CM: I65.22  ICD-9-CM: 433.10  2/13/2013        Nocturia ICD-10-CM: R35.1  ICD-9-CM: 788.43  2/13/2013        Hearing loss ICD-10-CM: H91.90  ICD-9-CM: 389.9  2/13/2013        Vitamin D deficiency ICD-10-CM: E55.9  ICD-9-CM: 268.9  2/13/2013        Sleep apnea ICD-10-CM: G47.30  ICD-9-CM: 780.57  2/13/2013        Edema of both legs ICD-10-CM: R60.0  ICD-9-CM: 782.3  2/13/2013        Memory disturbance ICD-10-CM: R41.3  ICD-9-CM: 780.93  2/13/2013        Gait disturbance ICD-10-CM: R26.9  ICD-9-CM: 781.2  2/13/2013        CAD (coronary artery disease) ICD-10-CM: I25.10  ICD-9-CM: 414.00  Unknown    Overview Addendum 11/30/2011  4:51 PM by Nilda Han MD     Medtronic stent in Lcx 12/2008 in [place], [place]    Dobutamine cardiolite 11/2011 normal perfusion, EF 53%               Pure hypercholesterolemia ICD-10-CM: E78.00  ICD-9-CM: 272.0  12/7/2009        Hypertension ICD-10-CM: I10  ICD-9-CM: 401.9  6/16/2009        Arthritis ICD-10-CM: M19.90  ICD-9-CM: 716.90  6/16/2009        Anemia ICD-10-CM: D64.9  ICD-9-CM: 285.9  6/16/2009              Current Outpatient Medications   Medication Sig Dispense Refill    metFORMIN ER (GLUCOPHAGE XR) 500 mg tablet TAKE TWO TABLETS BY MOUTH EVERY EVENING WITH SUPPER OR EVERY NIGHT AT BEDTIME 180 Tab 0    donepezil (ARICEPT) 10 mg tablet TAKE ONE TABLET BY MOUTH EVERY NIGHT AT BEDTIME 30 Tab 0    lisinopril (PRINIVIL, ZESTRIL) 5 mg tablet TAKE ONE TABLET BY MOUTH DAILY FOR KIDNEY PROTECTION 30 Tab 0    pravastatin (PRAVACHOL) 40 mg tablet TAKE ONE TABLET BY MOUTH EVERY NIGHT AT BEDTIME 30 Tab 0    VITAMIN B COMPLEX NO.12-NIACIN PO Take 1 Tab by mouth every seven (7) days.       glucose blood VI test strips (ONETOUCH ULTRA TEST) strip TEST BLOOD SUGAR 2 TO 3 TIMES A WEEK 50 Strip 5    cholecalciferol, VITAMIN D3, (VITAMIN D3) 5,000 unit tab tablet Take 1,000 Units by mouth daily.  cetirizine (ZYRTEC) 10 mg tablet Take 10 mg by mouth daily.  nitroglycerin (NITROSTAT) 0.4 mg SL tablet DISSOLVE 1 TAB UNDER TONGUE FOR CHEST PAIN - IF PAIN REMAINS AFTER 5 MIN, CALL 911 AND REPEAT DOSE.  MAX 3 TABS IN 15 MINUTES (Patient not taking: Reported on 3/14/2019) 1 Bottle 3     No Known Allergies  Past Medical History:   Diagnosis Date    Advance directive discussed with patient 05/13/2015,05/25/2016    Anemia 6/16/2009    Anemia NEC     Arthritis 6/16/2009    BPH (benign prostatic hyperplasia)     CAD (coronary artery disease)     CAD (coronary artery disease) of artery bypass graft 6/16/2009    dr faye Regalado or associate Dr Elodia Humphries    Carotid stenosis     Chronic pain     back    Concussion syndrome     DM (diabetes mellitus) ([facility]     Edema     Gait disturbance     Glaucoma suspect 12/04/14    [facility]I notes Kei Mejia    Headache due to trauma 1/2012    hit by a device on a door    Headache(784.0)     Hearing difficulty     Hypercholesterolemia     Hypertension 6/16/2009    Kidney stone 7/10    dr [facility] styles [facility]    Microalbuminuria 2/2013    Mild memory disturbance Branden Room also    neuro associates Alisia Mckenna     Nasal septal deviation     Pain in back 6/16/2009    Screening for diabetic retinopathy 7/18/16    exam neg for retinopathy Stiven'/Faulkner    Sleep apnea     Vitamin D deficiency      Past Surgical History:   Procedure Laterality Date    CARDIAC SURG PROCEDURE UNLIST      stent 2008    ENDOSCOPY, COLON, DIAGNOSTIC      in [place]- pt thinks in 2006    HX BACK SURGERY      HX CORONARY STENT PLACEMENT      HX HEART CATHETERIZATION      HX HEENT      left cataract    HX ORTHOPAEDIC      back multiple times    HX OTHER SURGICAL  12/2012    temporal  artery bx    HX OTHER SURGICAL  4/23/14 lower ext doppler    HX OTHER SURGICAL  16    echo report EF 60%    HX PTCA       Family History   Problem Relation Age of Onset    Heart Disease Mother     Heart Disease Father     Cancer Sister     Dementia Sister     Cancer Brother         leukemia    Arthritis-osteo Brother     Migraines Brother     Migraines Child     Arthritis-osteo Child      Social History     Tobacco Use    Smoking status: Former Smoker     Packs/day: 0.50     Years: 30.00     Pack years: 15.00     Types: Cigarettes     Last attempt to quit: 2014     Years since quittin.7    Smokeless tobacco: Never Used   Substance Use Topics    Alcohol use: No     Comment: occassionally        Review of Systems:   Constitutional: Negative for fever, chills, weight loss, malaise/fatigue. HEENT: Negative for nosebleeds, vision changes. Respiratory: Negative for cough, hemoptysis  Cardiovascular: Negative for chest pain, palpitations, orthopnea, claudication, leg swelling, syncope, and PND. Gastrointestinal: Negative for nausea, vomiting, diarrhea, blood in stool and melena. Genitourinary: Negative for dysuria, and hematuria. Musculoskeletal: Negative for myalgias, + back arthralgia. Skin: Negative for rash. Heme: Does not bleed or bruise easily. Neurological: Negative for speech change and focal weakness. Psychological: Dementia, short term memory deficit. Objective:     Visit Vitals  /68 (BP 1 Location: Left arm, BP Patient Position: Sitting)   Pulse 72   Resp 14   Ht 5' 9\" (1.753 m)   Wt 245 lb (111.1 kg)   SpO2 96%   BMI 36.18 kg/m²      Physical Exam:   Constitutional: well-developed and well-nourished. No respiratory distress. Head: Normocephalic and atraumatic. Eyes: Pupils are equal, round  ENT: Hearing grossly normal  Neck: Supple. No JVD present. Cardiovascular: Normal rate, regular rhythm. Exam reveals no gallop and no friction rub. No murmur heard.   Pulmonary/Chest: Effort normal and breath sounds normal. No wheezes. Abdominal: Soft, no tenderness. Obese. Musculoskeletal: no edema. Neurological: alert,oriented. Skin: Skin is warm and dry  Psychiatric: Pleasant. Some short term memory deficit. Assessment/Plan:       ICD-10-CM ICD-9-CM    1. Coronary artery disease involving native coronary artery of native heart without angina pectoris I25.10 414.01    2. Essential hypertension I10 401.9      Mr. Althea Richey denies any angina associated with his CAD. He has had no recurrence of cardiac symptoms. Lipids managed by Dr. Gloria Glover nitroglycerin on hand prn. BP well controlled today, 118/68. Continue lisinopril. Follow up in 1 year, sooner for new/worsening issues. Mrs. Althea Richey will speak to Dr. Pool Booker regarding follow up with Dr. Jori Crabtree for dementia. Future Appointments   Date Time Provider Josh Hernández   8/6/2019 10:20 AM Chris, Sue Weber MD [facility]         Thank you for involving me in this patient's care and please call with further concerns or questions. Chidi Vinson M.D.   Electrophysiology/Cardiology  [facility] [place]  [address], [place] [address]  [facility], [address] [place] [address]  [phone]

## 2019-05-02 DIAGNOSIS — E11.9 TYPE 2 DIABETES MELLITUS WITHOUT COMPLICATION, WITHOUT LONG-TERM CURRENT USE OF INSULIN (HCC): Primary | ICD-10-CM

## 2019-05-02 DIAGNOSIS — I10 ESSENTIAL HYPERTENSION: ICD-10-CM

## 2019-06-25 ENCOUNTER — OFFICE VISIT (OUTPATIENT)
Dept: FAMILY MEDICINE CLINIC | Age: 84
End: 2019-06-25

## 2019-06-25 VITALS
WEIGHT: 245 LBS | HEIGHT: 69 IN | RESPIRATION RATE: 22 BRPM | OXYGEN SATURATION: 95 % | TEMPERATURE: 97.4 F | SYSTOLIC BLOOD PRESSURE: 94 MMHG | HEART RATE: 66 BPM | BODY MASS INDEX: 36.29 KG/M2 | DIASTOLIC BLOOD PRESSURE: 62 MMHG

## 2019-06-25 DIAGNOSIS — E11.42 DIABETIC PERIPHERAL NEUROPATHY ASSOCIATED WITH TYPE 2 DIABETES MELLITUS (HCC): ICD-10-CM

## 2019-06-25 DIAGNOSIS — E55.9 VITAMIN D DEFICIENCY: ICD-10-CM

## 2019-06-25 DIAGNOSIS — F39 MOOD DISORDER (HCC): ICD-10-CM

## 2019-06-25 DIAGNOSIS — I10 ESSENTIAL HYPERTENSION: ICD-10-CM

## 2019-06-25 DIAGNOSIS — E66.01 SEVERE OBESITY (HCC): ICD-10-CM

## 2019-06-25 DIAGNOSIS — R41.3 MEMORY DISTURBANCE: ICD-10-CM

## 2019-06-25 DIAGNOSIS — E78.00 PURE HYPERCHOLESTEROLEMIA: ICD-10-CM

## 2019-06-25 DIAGNOSIS — E11.9 TYPE 2 DIABETES MELLITUS WITHOUT COMPLICATION, WITHOUT LONG-TERM CURRENT USE OF INSULIN (HCC): ICD-10-CM

## 2019-06-25 DIAGNOSIS — Z02.9 ADMINISTRATIVE ENCOUNTER: Primary | ICD-10-CM

## 2019-06-25 RX ORDER — PRAVASTATIN SODIUM 40 MG/1
TABLET ORAL
Qty: 90 TAB | Refills: 3 | Status: SHIPPED | OUTPATIENT
Start: 2019-06-25 | End: 2019-08-30

## 2019-06-25 RX ORDER — METFORMIN HYDROCHLORIDE 500 MG/1
TABLET, EXTENDED RELEASE ORAL
Qty: 180 TAB | Refills: 0 | Status: SHIPPED | OUTPATIENT
Start: 2019-06-25 | End: 2019-08-30

## 2019-06-25 RX ORDER — LISINOPRIL 5 MG/1
TABLET ORAL
Qty: 90 TAB | Refills: 3 | Status: SHIPPED | OUTPATIENT
Start: 2019-06-25 | End: 2019-08-30

## 2019-06-25 RX ORDER — DONEPEZIL HYDROCHLORIDE 10 MG/1
TABLET, FILM COATED ORAL
Qty: 90 TAB | Refills: 3 | Status: SHIPPED | OUTPATIENT
Start: 2019-06-25 | End: 2019-08-30

## 2019-06-25 NOTE — PROGRESS NOTES
Lauryn Else  Identified pt with two pt identifiers(name and ). Chief Complaint Patient presents with  Documentation Caremore Form 1. Have you been to the ER, urgent care clinic since your last visit? Hospitalized since your last visit? No 
 
2. Have you seen or consulted any other health care providers outside of the [address] [place] [address] since your last visit? Include any pap smears or colon screening. No 
 
 
Would you like to sign up for MyChart today, if you have not already done so? No 
If not, would you like information on MyChart, and how to sign up at a later time? No 
 
 
Medication reconciliation up to date and corrected with patient at this time. Today's provider has been notified of reason for visit, vitals and flowsheets obtained on patients. Reviewed record in preparation for visit, huddled with provider and have obtained necessary documentation. Health Maintenance Due Topic  Shingrix Vaccine Age 50> (1 of 2)  LIPID PANEL Q1 Wt Readings from Last 3 Encounters:  
19 245 lb (111.1 kg) 19 245 lb (111.1 kg) 19 243 lb 1.6 oz (110.3 kg) Temp Readings from Last 3 Encounters:  
19 97.4 °F (36.3 °C) (Oral) 19 96 °F (35.6 °C) (Oral) 18 96.9 °F (36.1 °C) (Oral) BP Readings from Last 3 Encounters:  
19 94/62  
19 118/68  
19 123/65 Pulse Readings from Last 3 Encounters:  
19 66  
19 72  
19 70 Vitals:  
 19 1000 BP: 94/62 Pulse: 66 Resp: 22 Temp: 97.4 °F (36.3 °C) TempSrc: Oral  
SpO2: 95% Weight: 245 lb (111.1 kg) Height: 5' 9\" (1.753 m) PainSc:   0 - No pain Learning Assessment: 
:  
 
Learning Assessment 2015 PRIMARY LEARNER Patient [address] CAREGIVER Yes CO-LEARNER NAME wife PRIMARY LANGUAGE ENGLISH  
LEARNER PREFERENCE PRIMARY LISTENING  
  OTHER (COMMENT)   DEMONSTRATION  
 ANSWERED BY wife RELATIONSHIP SELF Depression Screening: 
:  
 
3 most recent PHQ Screens 2/25/2019 Little interest or pleasure in doing things Not at all Feeling down, depressed, irritable, or hopeless Not at all Total Score PHQ 2 0 Trouble falling or staying asleep, or sleeping too much - Feeling tired or having little energy - Poor appetite, weight loss, or overeating - Feeling bad about yourself - or that you are a failure or have let yourself or your family down - Trouble concentrating on things such as school, work, reading, or watching TV - Moving or speaking so slowly that other people could have noticed; or the opposite being so fidgety that others notice - Thoughts of being better off dead, or hurting yourself in some way -  
PHQ 9 Score - Fall Risk Assessment: 
:  
 
Fall Risk Assessment, last 12 mths 2/25/2019 Able to walk? Yes Fall in past 12 months? No  
Fall with injury? -  
Number of falls in past 12 months -  
 
 
Abuse Screening: 
:  
 
Abuse Screening Questionnaire 2/25/2019 7/16/2018 11/8/2017 Do you ever feel afraid of your partner? N N N Are you in a relationship with someone who physically or mentally threatens you? N N N Is it safe for you to go home? [place]  
 
 
ADL Screening: 
:  
 
ADL Assessment 2/25/2019 Feeding yourself No Help Needed Getting from bed to chair No Help Needed Getting dressed No Help Needed Bathing or showering No Help Needed Walk across the room (includes cane/walker) Help Needed Using the telphone No Help Needed Taking your medications No Help Needed Preparing meals No Help Needed Managing money (expenses/bills) No Help Needed Moderately strenuous housework (laundry) No Help Needed Shopping for personal items (toiletries/medicines) No Help Needed Shopping for groceries No Help Needed Driving Help Needed Climbing a flight of stairs Help Needed Getting to places beyond walking distances Help Needed

## 2019-06-25 NOTE — PROGRESS NOTES
Here for completion of CareMore form. 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 Also has hyperlipids. Mood disorder and dementia-stable. Sees neuro through [facility]. Needs repeat A1c in August.  Wife will take pt to [facility] to have done. Needs some labs done today. Pt is fasting. HTN-stable. Visit Vitals BP 94/62 (BP 1 Location: Left arm, BP Patient Position: Sitting) Pulse 66 Temp 97.4 °F (36.3 °C) (Oral) Resp 22 Ht 5' 9\" (1.753 m) Wt 245 lb (111.1 kg) SpO2 95% BMI 36.18 kg/m² Patient alert and cooperative. Reviewed above. Assessment: 
1. AODM, previously well controlled. Will be due for six month recheck in August. 
2. Previous polyneuropathy and peripheral vascular disease. 3. History of mood disorder and memory disturbance following head trauma, followed by neurologist. 
 
Plan: 1. Recheck here in eight months for wellness. 2. Refilled meds. 3. Follow otherwise here prn.

## 2019-06-26 LAB — 25(OH)D3+25(OH)D2 SERPL-MCNC: 36.7 NG/ML (ref 30–100)

## 2019-08-02 LAB
CREATININE, EXTERNAL: 1.69
LDL-C, EXTERNAL: 88

## 2019-08-30 ENCOUNTER — HOSPITAL ENCOUNTER (OUTPATIENT)
Age: 84
Setting detail: OBSERVATION
Discharge: HOME HEALTH | End: 2019-09-01
Attending: EMERGENCY MEDICINE | Admitting: INTERNAL MEDICINE
Payer: MEDICARE

## 2019-08-30 ENCOUNTER — APPOINTMENT (OUTPATIENT)
Dept: GENERAL RADIOLOGY | Age: 84
End: 2019-08-30
Attending: EMERGENCY MEDICINE
Payer: MEDICARE

## 2019-08-30 DIAGNOSIS — R55 PRE-SYNCOPE: ICD-10-CM

## 2019-08-30 DIAGNOSIS — R06.02 SOB (SHORTNESS OF BREATH): ICD-10-CM

## 2019-08-30 DIAGNOSIS — R61 DIAPHORESIS: Primary | ICD-10-CM

## 2019-08-30 PROBLEM — N17.9 AKI (ACUTE KIDNEY INJURY) (HCC): Status: ACTIVE | Noted: 2019-08-30

## 2019-08-30 LAB
ALBUMIN SERPL-MCNC: 3.8 G/DL (ref 3.5–5)
ALBUMIN/GLOB SERPL: 1.1 {RATIO} (ref 1.1–2.2)
ALP SERPL-CCNC: 80 U/L (ref 45–117)
ALT SERPL-CCNC: 13 U/L (ref 12–78)
ANION GAP SERPL CALC-SCNC: 4 MMOL/L (ref 5–15)
APPEARANCE UR: CLEAR
AST SERPL-CCNC: 10 U/L (ref 15–37)
ATRIAL RATE: 77 BPM
BACTERIA URNS QL MICRO: NEGATIVE /HPF
BASOPHILS # BLD: 0 K/UL (ref 0–0.1)
BASOPHILS NFR BLD: 1 % (ref 0–1)
BILIRUB SERPL-MCNC: 0.4 MG/DL (ref 0.2–1)
BILIRUB UR QL: NEGATIVE
BUN SERPL-MCNC: 28 MG/DL (ref 6–20)
BUN/CREAT SERPL: 15 (ref 12–20)
CALCIUM SERPL-MCNC: 9.1 MG/DL (ref 8.5–10.1)
CALCULATED P AXIS, ECG09: 64 DEGREES
CALCULATED R AXIS, ECG10: 34 DEGREES
CALCULATED T AXIS, ECG11: 29 DEGREES
CHLORIDE SERPL-SCNC: 108 MMOL/L (ref 97–108)
CK SERPL-CCNC: 155 U/L (ref 39–308)
CO2 SERPL-SCNC: 27 MMOL/L (ref 21–32)
COLOR UR: ABNORMAL
COMMENT, HOLDF: NORMAL
CREAT SERPL-MCNC: 1.85 MG/DL (ref 0.7–1.3)
D DIMER PPP FEU-MCNC: 0.91 MG/L FEU (ref 0–0.65)
DIAGNOSIS, 93000: NORMAL
DIFFERENTIAL METHOD BLD: ABNORMAL
EOSINOPHIL # BLD: 0.2 K/UL (ref 0–0.4)
EOSINOPHIL NFR BLD: 3 % (ref 0–7)
EPITH CASTS URNS QL MICRO: ABNORMAL /LPF
ERYTHROCYTE [DISTWIDTH] IN BLOOD BY AUTOMATED COUNT: 12.7 % (ref 11.5–14.5)
GLOBULIN SER CALC-MCNC: 3.5 G/DL (ref 2–4)
GLUCOSE BLD STRIP.AUTO-MCNC: 182 MG/DL (ref 65–100)
GLUCOSE SERPL-MCNC: 219 MG/DL (ref 65–100)
GLUCOSE UR STRIP.AUTO-MCNC: NEGATIVE MG/DL
HCT VFR BLD AUTO: 38.4 % (ref 36.6–50.3)
HGB BLD-MCNC: 11.3 G/DL (ref 12.1–17)
HGB UR QL STRIP: NEGATIVE
HYALINE CASTS URNS QL MICRO: ABNORMAL /LPF (ref 0–5)
IMM GRANULOCYTES # BLD AUTO: 0 K/UL (ref 0–0.04)
IMM GRANULOCYTES NFR BLD AUTO: 0 % (ref 0–0.5)
KETONES UR QL STRIP.AUTO: NEGATIVE MG/DL
LEUKOCYTE ESTERASE UR QL STRIP.AUTO: ABNORMAL
LYMPHOCYTES # BLD: 1.9 K/UL (ref 0.8–3.5)
LYMPHOCYTES NFR BLD: 29 % (ref 12–49)
MCH RBC QN AUTO: 28 PG (ref 26–34)
MCHC RBC AUTO-ENTMCNC: 29.4 G/DL (ref 30–36.5)
MCV RBC AUTO: 95.3 FL (ref 80–99)
MONOCYTES # BLD: 0.5 K/UL (ref 0–1)
MONOCYTES NFR BLD: 7 % (ref 5–13)
NEUTS SEG # BLD: 4 K/UL (ref 1.8–8)
NEUTS SEG NFR BLD: 60 % (ref 32–75)
NITRITE UR QL STRIP.AUTO: NEGATIVE
NRBC # BLD: 0 K/UL (ref 0–0.01)
NRBC BLD-RTO: 0 PER 100 WBC
P-R INTERVAL, ECG05: 126 MS
PH UR STRIP: 6 [PH] (ref 5–8)
PLATELET # BLD AUTO: 254 K/UL (ref 150–400)
PMV BLD AUTO: 10 FL (ref 8.9–12.9)
POTASSIUM SERPL-SCNC: 4.5 MMOL/L (ref 3.5–5.1)
PROT SERPL-MCNC: 7.3 G/DL (ref 6.4–8.2)
PROT UR STRIP-MCNC: NEGATIVE MG/DL
Q-T INTERVAL, ECG07: 374 MS
QRS DURATION, ECG06: 112 MS
QTC CALCULATION (BEZET), ECG08: 423 MS
RBC # BLD AUTO: 4.03 M/UL (ref 4.1–5.7)
RBC #/AREA URNS HPF: ABNORMAL /HPF (ref 0–5)
SAMPLES BEING HELD,HOLD: NORMAL
SERVICE CMNT-IMP: ABNORMAL
SODIUM SERPL-SCNC: 139 MMOL/L (ref 136–145)
SP GR UR REFRACTOMETRY: 1.01 (ref 1–1.03)
TROPONIN I SERPL-MCNC: <0.05 NG/ML
UR CULT HOLD, URHOLD: NORMAL
UROBILINOGEN UR QL STRIP.AUTO: 0.2 EU/DL (ref 0.2–1)
VENTRICULAR RATE, ECG03: 77 BPM
WBC # BLD AUTO: 6.7 K/UL (ref 4.1–11.1)
WBC URNS QL MICRO: ABNORMAL /HPF (ref 0–4)

## 2019-08-30 PROCEDURE — 74011250636 HC RX REV CODE- 250/636: Performed by: INTERNAL MEDICINE

## 2019-08-30 PROCEDURE — 99285 EMERGENCY DEPT VISIT HI MDM: CPT

## 2019-08-30 PROCEDURE — 36415 COLL VENOUS BLD VENIPUNCTURE: CPT

## 2019-08-30 PROCEDURE — 84484 ASSAY OF TROPONIN QUANT: CPT

## 2019-08-30 PROCEDURE — 99218 HC RM OBSERVATION: CPT

## 2019-08-30 PROCEDURE — 71045 X-RAY EXAM CHEST 1 VIEW: CPT

## 2019-08-30 PROCEDURE — 82962 GLUCOSE BLOOD TEST: CPT

## 2019-08-30 PROCEDURE — 65660000000 HC RM CCU STEPDOWN

## 2019-08-30 PROCEDURE — 74011250637 HC RX REV CODE- 250/637: Performed by: INTERNAL MEDICINE

## 2019-08-30 PROCEDURE — 81001 URINALYSIS AUTO W/SCOPE: CPT

## 2019-08-30 PROCEDURE — 85379 FIBRIN DEGRADATION QUANT: CPT

## 2019-08-30 PROCEDURE — 74011250636 HC RX REV CODE- 250/636: Performed by: EMERGENCY MEDICINE

## 2019-08-30 PROCEDURE — 85025 COMPLETE CBC W/AUTO DIFF WBC: CPT

## 2019-08-30 PROCEDURE — 96372 THER/PROPH/DIAG INJ SC/IM: CPT

## 2019-08-30 PROCEDURE — 93005 ELECTROCARDIOGRAM TRACING: CPT

## 2019-08-30 PROCEDURE — 82550 ASSAY OF CK (CPK): CPT

## 2019-08-30 PROCEDURE — 80053 COMPREHEN METABOLIC PANEL: CPT

## 2019-08-30 RX ORDER — PRAVASTATIN SODIUM 40 MG/1
40 TABLET ORAL
Status: DISCONTINUED | OUTPATIENT
Start: 2019-08-30 | End: 2019-09-01 | Stop reason: HOSPADM

## 2019-08-30 RX ORDER — PRAVASTATIN SODIUM 40 MG/1
40 TABLET ORAL
COMMUNITY
End: 2020-01-01

## 2019-08-30 RX ORDER — SODIUM CHLORIDE 0.9 % (FLUSH) 0.9 %
5-40 SYRINGE (ML) INJECTION AS NEEDED
Status: DISCONTINUED | OUTPATIENT
Start: 2019-08-30 | End: 2019-09-01 | Stop reason: HOSPADM

## 2019-08-30 RX ORDER — ONDANSETRON 2 MG/ML
4 INJECTION INTRAMUSCULAR; INTRAVENOUS
Status: DISCONTINUED | OUTPATIENT
Start: 2019-08-30 | End: 2019-09-01 | Stop reason: HOSPADM

## 2019-08-30 RX ORDER — METFORMIN HYDROCHLORIDE 500 MG/1
1000 TABLET, FILM COATED, EXTENDED RELEASE ORAL EVERY EVENING
COMMUNITY
End: 2019-01-01 | Stop reason: SDUPTHER

## 2019-08-30 RX ORDER — CETIRIZINE HCL 10 MG
10 TABLET ORAL
Status: DISCONTINUED | OUTPATIENT
Start: 2019-08-31 | End: 2019-09-01 | Stop reason: HOSPADM

## 2019-08-30 RX ORDER — MULTIVIT WITH MINERALS/HERBS
1 TABLET ORAL EVERY OTHER DAY
COMMUNITY
End: 2020-01-01

## 2019-08-30 RX ORDER — MELATONIN
1000
COMMUNITY
End: 2020-01-01

## 2019-08-30 RX ORDER — INSULIN LISPRO 100 [IU]/ML
INJECTION, SOLUTION INTRAVENOUS; SUBCUTANEOUS
Status: DISCONTINUED | OUTPATIENT
Start: 2019-08-30 | End: 2019-09-01 | Stop reason: HOSPADM

## 2019-08-30 RX ORDER — SODIUM CHLORIDE 0.9 % (FLUSH) 0.9 %
5-40 SYRINGE (ML) INJECTION EVERY 8 HOURS
Status: DISCONTINUED | OUTPATIENT
Start: 2019-08-30 | End: 2019-09-01 | Stop reason: HOSPADM

## 2019-08-30 RX ORDER — HEPARIN SODIUM 5000 [USP'U]/ML
5000 INJECTION, SOLUTION INTRAVENOUS; SUBCUTANEOUS EVERY 8 HOURS
Status: DISCONTINUED | OUTPATIENT
Start: 2019-08-30 | End: 2019-09-01 | Stop reason: HOSPADM

## 2019-08-30 RX ORDER — SODIUM CHLORIDE 9 MG/ML
75 INJECTION, SOLUTION INTRAVENOUS CONTINUOUS
Status: DISCONTINUED | OUTPATIENT
Start: 2019-08-30 | End: 2019-09-01 | Stop reason: HOSPADM

## 2019-08-30 RX ORDER — MELATONIN
1000 DAILY
Status: DISCONTINUED | OUTPATIENT
Start: 2019-08-31 | End: 2019-09-01 | Stop reason: HOSPADM

## 2019-08-30 RX ORDER — SODIUM CHLORIDE 9 MG/ML
250 INJECTION, SOLUTION INTRAVENOUS ONCE
Status: COMPLETED | OUTPATIENT
Start: 2019-08-30 | End: 2019-08-30

## 2019-08-30 RX ORDER — MAGNESIUM SULFATE 100 %
4 CRYSTALS MISCELLANEOUS AS NEEDED
Status: DISCONTINUED | OUTPATIENT
Start: 2019-08-30 | End: 2019-09-01 | Stop reason: HOSPADM

## 2019-08-30 RX ORDER — ACETAMINOPHEN 325 MG/1
650 TABLET ORAL
Status: DISCONTINUED | OUTPATIENT
Start: 2019-08-30 | End: 2019-09-01 | Stop reason: HOSPADM

## 2019-08-30 RX ORDER — MULTIVIT WITH MINERALS/HERBS
1 TABLET ORAL EVERY OTHER DAY
Status: DISCONTINUED | OUTPATIENT
Start: 2019-08-30 | End: 2019-09-01 | Stop reason: HOSPADM

## 2019-08-30 RX ORDER — DONEPEZIL HYDROCHLORIDE 10 MG/1
10 TABLET, FILM COATED ORAL
Status: DISCONTINUED | OUTPATIENT
Start: 2019-08-30 | End: 2019-09-01 | Stop reason: HOSPADM

## 2019-08-30 RX ORDER — LISINOPRIL 5 MG/1
5 TABLET ORAL
COMMUNITY
End: 2019-09-01

## 2019-08-30 RX ORDER — DONEPEZIL HYDROCHLORIDE 10 MG/1
10 TABLET, FILM COATED ORAL
COMMUNITY
End: 2020-01-01

## 2019-08-30 RX ADMIN — HEPARIN SODIUM 5000 UNITS: 5000 INJECTION INTRAVENOUS; SUBCUTANEOUS at 21:41

## 2019-08-30 RX ADMIN — PRAVASTATIN SODIUM 40 MG: 40 TABLET ORAL at 21:42

## 2019-08-30 RX ADMIN — Medication 10 ML: at 21:42

## 2019-08-30 RX ADMIN — SODIUM CHLORIDE 250 ML/HR: 900 INJECTION, SOLUTION INTRAVENOUS at 15:11

## 2019-08-30 RX ADMIN — DONEPEZIL HYDROCHLORIDE 10 MG: 10 TABLET, FILM COATED ORAL at 21:42

## 2019-08-30 RX ADMIN — SODIUM CHLORIDE 75 ML/HR: 900 INJECTION, SOLUTION INTRAVENOUS at 19:33

## 2019-08-30 NOTE — ROUTINE PROCESS
TRANSFER - OUT REPORT:    Verbal report given MARTHA Luke(name) on Jannet Haddad  being transferred to [facility](unit) for routine progression of care       Report consisted of patients Situation, Background, Assessment and   Recommendations(SBAR). Information from the following report(s) SBAR was reviewed with the receiving nurse. Lines:   Peripheral IV 08/30/19 Right Antecubital (Active)        Opportunity for questions and clarification was provided.       Patient transported with:   Monitor

## 2019-08-30 NOTE — ED NOTES
Called lab for status of CMP & cardiac enzymes, they stated few more minutes.   Will notify provider
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
## 2019-08-30 NOTE — ED TRIAGE NOTES
Arrives with wife and family friend for unwitnessed near syncopal event. Pt was walking to the restroom when wife heard a thump and found patient sitting in chair awake but diaphoretic. 911 was called and ended up assisting patient to family friends car who brought them here for evaluation. Pt of Dr. Katherine Amaro. Pt denies all complaints at this time, hx dementia, poor historian.
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
## 2019-08-30 NOTE — ED NOTES
Pt arrives via wheelchair to ED accompanied by wife with c/o SOB and diaphoresis PTA. Wife reports \"I heard a thump and he was sitting down in the chair sweating. He is unable to communicate well because he's had a head injury\". Denies falling.

## 2019-08-30 NOTE — ACP (ADVANCE CARE PLANNING)
Advance Care Planning Note    Name: Omid Mars  YOB: 1935  MRN: [id number]  Admission Date: 8/30/2019 12:45 PM    Date of discussion: 8/30/2019    Active Diagnoses:    Hospital Problems  Date Reviewed: 8/30/2019          Codes Class Noted POA    Pre-syncope ICD-10-CM: R55  ICD-9-CM: 780.2  8/30/2019 Yes        DEREK (acute kidney injury) [facility]) ICD-10-CM: N17.9  ICD-9-CM: 584.9  8/30/2019 Yes        Uncontrolled type II diabetes mellitus ([facility] ICD-10-CM: E11.65  ICD-9-CM: 250.02  2/14/2013 Yes               These active diagnoses are of sufficient risk that focused discussion on advance care planning is indicated in order to allow the patient to thoughtfully consider personal goals of care, and if situations arise that prevent the ability to personally give input, to ensure appropriate representation of their personal desires for different levels and aggressiveness of care. Discussion:     Persons present and participating in discussion: Omid Mars, patient's wife Annette Sanchez and Joselito Cain MD.    Discussion: Addressed decompensated medical problems, Co-morbidities, Advance directives, Prognosis and confirmation of a surrogate decision Maker. Time Spent:     Total time spent face-to-face in education and discussion:16 minutes.      Joselito Cain MD  8/30/2019  4:09 PM
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
## 2019-08-30 NOTE — H&P
[address]  HISTORY AND PHYSICAL    Name:  Adriana Schuler  MR#:  [id number]  :  1935  ACCOUNT #:  [de-identified]  ADMIT DATE:  2019      PRIMARY CARE PHYSICIAN:  Aruna Em MD    PRIMARY CARDIOLOGIST:  Moises Santana MD    CHIEF COMPLAINT:  Shortness of breath with near passing out. HISTORY OF PRESENTING ILLNESS:  Please note the patient's wife at the bedside contributed to most of the history as the patient with underlying mild dementia and was unable to give all pertinent history. An 83-year gentleman with a past medical history significant for primary hypertension, non-insulin treated type 2 diabetes mellitus, benign prostatic hypertrophy, coronary artery disease, anemia, arthritis, and nephrolithiasis, was brought to the emergency room from home via private vehicle for complaints of shortness of breath, increased sweating, and near fainting. Per patient's wife, whilst doing some household chores, heard a \"thump\" and thereafter found the patient sitting in a chair, short of breath with increased perspiration. The patient denied any loss of consciousness, dizziness, headaches, visual deficits, chest pains, palpitations, nausea, vomiting, cough, fevers, chills, bladder or bowel irregularities. PAST MEDICAL HISTORY:  1.  Primary hypertension. 2.  Non-insulin treated type 2 diabetes mellitus. 3.  Nephrolithiasis. 4.  Anemia. 5.  Arthritis. 6.  Coronary artery disease. 7.  Benign prostatic hypertrophy. PAST SURGICAL HISTORY:  1. Endoscopy. 2.  Colonoscopy. 3.  PCI with stent placement. 4.  Back surgery. 5.  Left cataract surgery. HOME MEDICATIONS:  1.  Vitamin B complex 1 tablet p.o. every other day. 2.  Cetirizine 10 mg p.o. every morning. 3.  Cholecalciferol 1 tablet p.o. every morning. 4.  Aricept 10 mg p.o. nightly. 5.  Lisinopril 5 mg p.o. nightly. 6.  Metformin 500-mg tablets 1000 mg p.o. every evening. 7.  Pravastatin 40 mg p.o. nightly. ALLERGIES:  NO KNOWN DRUG ALLERGIES. SOCIAL HISTORY:  Significant for being , lives at home with his wife, ambulates with the aid of a cane. No history of alcohol use disorder or tobacco use. FAMILY HISTORY:  Reviewed and positive for coronary artery disease on both the maternal and paternal sides of the family, sister with dementia, and a sister with malignancy. REVIEW OF SYSTEMS:  A complete system review conducted essentially negative otherwise as outlined in the history of the presenting illness. PHYSICAL EXAMINATION:  GENERAL:  On exam, the patient is awake, alert, not in any distress. VITAL SIGNS:  Blood pressure of 125/54, heart rate of 68, respiratory rate 20, saturating 93% on room air. HEAD:  Normocephalic. Pupils are equal and reactive to light without any nystagmus. ENT:  Ears, nose, and throat, clear. NECK:  No jugular venous distention. No carotid bruits. CARDIOVASCULAR:  S1, S2 present. RESPIRATORY:  Lungs with decreased air entry at both bases without any crepitations or rhonchi. GASTROINTESTINAL:  Bowel sounds present. The abdomen is soft, nontender. No rebound, no guarding. GENITOURINARY:  No suprapubic or flank tenderness. MUSCULOSKELETAL:  About a 2+ bipedal edema. No asymmetry appreciated. CENTRAL NERVOUS SYSTEM:  The patient is awake, alert, oriented to place and person. LABORATORY DATA/RADIOGRAPHIC FINDINGS:  A 12-lead EKG personally reviewed that showed normal sinus rhythm with some sinus arrhythmia at 77 per minute. No acute ST or T-wave abnormalities. A chest x-ray personally reviewed that showed elevated left hemidiaphragm, no other acute abnormality. CBC with a WBC of 6.7, hemoglobin 11.3, hematocrit 38.4 with a platelet count of 879. Metabolic panel with a sodium of 139, potassium of 4.5, chloride of 108, bicarbonate of 27, BUN of 28, creatinine 1.85, glucose of 219, calcium of 9.1.   First troponin less than 0.05.    ASSESSMENT AND PLAN:  1. Cardiovascular: The patient will be admitted to the inpatient level monitored floor for management of presyncope probably due to vasovagal event. However, the patient is at increased risk for dysrhythmias. Coronary artery disease with history of percutaneous coronary intervention. Fairly well-controlled primary hypertension. Dyslipidemia. We will obtain 2-D echocardiogram to evaluate left ventricle and valvular function, carotid Dopplers. IV fluids. Orthostatic readings and Cardiology consult. 2.  Renal:  Acute kidney injury probably due to dehydration, some acute tubular necrosis and probable medication side effects. We will hold nephrotoxic medications including metformin and lisinopril. Initiate IV fluids, trend BUN and creatinine. As needed renal ultrasound and Nephrology consult. 3.  Genitourinary:  History of benign prostatic hypertrophy. 4.  Endocrine:  Uncontrolled non-insulin treated type 2 diabetes mellitus with complications including hyperglycemia. We will hold metformin. A1c check, Accu-Chek monitoring, sliding scale insulin, diabetic teaching. 5.  Musculoskeletal:  Probable osteoarthritis. 6.  Genitourinary:  History of nephrolithiasis. 7.  Prophylaxis for deep venous thrombosis. 8.  Directives:  Full code with a guarded prognosis. Discussed with the patient and the patient's wife. In summary, an 51-year-old gentleman with a few medical problems including coronary artery disease, percutaneous coronary intervention with stent placement, primary hypertension, probable anemia of chronic disease, arthritis, type 2 diabetes mellitus, and benign prostatic hypertrophy,  is being admitted to the inpatient level monitored floor for probable presyncope.   This patient is at increased risk of further decompensation and will need close inpatient monitoring including for dysrhythmias, orthostatic readings, IV hydration, 2-D echocardiogram to evaluate left ventricle and valvular function, carotid Dopplers to evaluate for any stenoses and Cardiology consult. The entire admission plans discussed in detail with the patient and the patient's wife, Tyler Dee, who was at the bedside and can be reached at cell [phone] or home [phone]. All questions and concerns were addressed. The patient's functional status prior to admission significant for the patient being able to ambulate with the aid of a cane. Total time for admission 40 minutes, of which at least 50% of the time was spent in plan of care and counseling of the patient.         MD SHAWN Jade [id number]  D:  08/30/2019 16:03  T:  08/30/2019 16:12  JOB #:  [id number]

## 2019-08-30 NOTE — PROGRESS NOTES
Admission Medication Reconciliation:    Information obtained from:  Rx Query and some from wife and patient  RxQuery data available¹:  YES    Comments/Recommendations: Updated PTA meds/reviewed patient's allergies. 1)  Neither patient nor his wife could name his medications, however his wife was able to tell me how he takes them when prompted with medication name. 2)  They do not know what dose of vitamin D he takes. 3) Takes B Complex about every other day. 4) Otherwise, no updates. ¹RxQuery pharmacy benefit data reflects medications filled and processed through the patient's insurance, however   this data does NOT capture whether the medication was picked up or is currently being taken by the patient. Allergies:  Patient has no known allergies.     Significant PMH/Disease States:   Past Medical History:   Diagnosis Date    Advance directive discussed with patient 05/13/2015,05/25/2016    Anemia 6/16/2009    Anemia NEC     Arthritis 6/16/2009    BPH (benign prostatic hyperplasia)     CAD (coronary artery disease)     CAD (coronary artery disease) of artery bypass graft 6/16/2009    dr faye Kahn or associate Dr Rivas Getting    Carotid stenosis     Chronic pain     back    Concussion syndrome     DM (diabetes mellitus) ([facility] [address]     Edema     Gait disturbance     Glaucoma suspect 12/04/14    [facility] notes Marvelyn Delores    Headache due to trauma 1/2012    hit by a device on a door    Headache(784.0)     Hearing difficulty     Hypercholesterolemia     Hypertension 6/16/2009    Kidney stone 7/10    dr Juan M Arnold Kayode lithotripsy    Microalbuminuria 2/2013    Mild memory disturbance Cruzita Brittle also    neuro associates Alisia Mckenna     Nasal septal deviation     Pain in back 6/16/2009    Screening for diabetic retinopathy 7/18/16    exam neg for retinopathy Stiven'/Kaushik    Sleep apnea     Vitamin D deficiency      Chief Complaint for this Admission:    Chief Complaint   Patient presents with    Shortness of Breath Excessive Sweating     Prior to Admission Medications:   Prior to Admission Medications   Prescriptions Last Dose Informant Patient Reported? Taking? CHOLECALCIFEROL, VITAMIN D3, PO 8/30/2019 at Unknown time  Yes Yes   Sig: Take  by mouth every morning. b complex vitamins tablet   Yes Yes   Sig: Take 1 Tab by mouth every other day. cetirizine (ZYRTEC) 10 mg tablet 8/30/2019 at Unknown time  Yes Yes   Sig: Take 10 mg by mouth every morning. donepezil (ARICEPT) 10 mg tablet 8/29/2019 at Unknown time  Yes Yes   Sig: Take 10 mg by mouth nightly. lisinopril (PRINIVIL, ZESTRIL) 5 mg tablet 8/29/2019 at Unknown time  Yes Yes   Sig: Take 5 mg by mouth nightly. Indications: kidney protection   metFORMIN (GLUMETZA ER) 500 mg TG24 24 hour tablet 8/29/2019 at Unknown time  Yes Yes   Sig: Take 1,000 mg by mouth every evening. pravastatin (PRAVACHOL) 40 mg tablet 8/29/2019 at Unknown time  Yes Yes   Sig: Take 40 mg by mouth nightly. Facility-Administered Medications: None       Please contact the main inpatient pharmacy with any questions or concerns at [phone] and we will direct you to the clinical pharmacist covering this patient's care while in-house.    Esperanza Summers

## 2019-08-31 ENCOUNTER — APPOINTMENT (OUTPATIENT)
Dept: NON INVASIVE DIAGNOSTICS | Age: 84
End: 2019-08-31
Attending: PHYSICIAN ASSISTANT
Payer: MEDICARE

## 2019-08-31 ENCOUNTER — APPOINTMENT (OUTPATIENT)
Dept: VASCULAR SURGERY | Age: 84
End: 2019-08-31
Attending: INTERNAL MEDICINE
Payer: MEDICARE

## 2019-08-31 LAB
ANION GAP SERPL CALC-SCNC: 5 MMOL/L (ref 5–15)
BASOPHILS # BLD: 0 K/UL (ref 0–0.1)
BASOPHILS NFR BLD: 0 % (ref 0–1)
BUN SERPL-MCNC: 25 MG/DL (ref 6–20)
BUN/CREAT SERPL: 17 (ref 12–20)
CALCIUM SERPL-MCNC: 8.8 MG/DL (ref 8.5–10.1)
CHLORIDE SERPL-SCNC: 110 MMOL/L (ref 97–108)
CHOLEST SERPL-MCNC: 141 MG/DL
CO2 SERPL-SCNC: 28 MMOL/L (ref 21–32)
CREAT SERPL-MCNC: 1.47 MG/DL (ref 0.7–1.3)
DIFFERENTIAL METHOD BLD: ABNORMAL
EOSINOPHIL # BLD: 0.2 K/UL (ref 0–0.4)
EOSINOPHIL NFR BLD: 3 % (ref 0–7)
ERYTHROCYTE [DISTWIDTH] IN BLOOD BY AUTOMATED COUNT: 12.5 % (ref 11.5–14.5)
EST. AVERAGE GLUCOSE BLD GHB EST-MCNC: 140 MG/DL
GLUCOSE BLD STRIP.AUTO-MCNC: 101 MG/DL (ref 65–100)
GLUCOSE BLD STRIP.AUTO-MCNC: 110 MG/DL (ref 65–100)
GLUCOSE BLD STRIP.AUTO-MCNC: 124 MG/DL (ref 65–100)
GLUCOSE BLD STRIP.AUTO-MCNC: 124 MG/DL (ref 65–100)
GLUCOSE SERPL-MCNC: 118 MG/DL (ref 65–100)
HBA1C MFR BLD: 6.5 % (ref 4.2–6.3)
HCT VFR BLD AUTO: 35 % (ref 36.6–50.3)
HDLC SERPL-MCNC: 44 MG/DL
HDLC SERPL: 3.2 {RATIO} (ref 0–5)
HGB BLD-MCNC: 10.3 G/DL (ref 12.1–17)
IMM GRANULOCYTES # BLD AUTO: 0 K/UL (ref 0–0.04)
IMM GRANULOCYTES NFR BLD AUTO: 0 % (ref 0–0.5)
LDLC SERPL CALC-MCNC: 83.2 MG/DL (ref 0–100)
LIPID PROFILE,FLP: NORMAL
LYMPHOCYTES # BLD: 2.6 K/UL (ref 0.8–3.5)
LYMPHOCYTES NFR BLD: 35 % (ref 12–49)
MCH RBC QN AUTO: 27.5 PG (ref 26–34)
MCHC RBC AUTO-ENTMCNC: 29.4 G/DL (ref 30–36.5)
MCV RBC AUTO: 93.3 FL (ref 80–99)
MONOCYTES # BLD: 0.6 K/UL (ref 0–1)
MONOCYTES NFR BLD: 8 % (ref 5–13)
NEUTS SEG # BLD: 4 K/UL (ref 1.8–8)
NEUTS SEG NFR BLD: 54 % (ref 32–75)
NRBC # BLD: 0 K/UL (ref 0–0.01)
NRBC BLD-RTO: 0 PER 100 WBC
PLATELET # BLD AUTO: 228 K/UL (ref 150–400)
PMV BLD AUTO: 10.2 FL (ref 8.9–12.9)
POTASSIUM SERPL-SCNC: 4.4 MMOL/L (ref 3.5–5.1)
RBC # BLD AUTO: 3.75 M/UL (ref 4.1–5.7)
SERVICE CMNT-IMP: ABNORMAL
SODIUM SERPL-SCNC: 143 MMOL/L (ref 136–145)
TRIGL SERPL-MCNC: 69 MG/DL (ref ?–150)
VLDLC SERPL CALC-MCNC: 13.8 MG/DL
WBC # BLD AUTO: 7.5 K/UL (ref 4.1–11.1)

## 2019-08-31 PROCEDURE — 85025 COMPLETE CBC W/AUTO DIFF WBC: CPT

## 2019-08-31 PROCEDURE — 83036 HEMOGLOBIN GLYCOSYLATED A1C: CPT

## 2019-08-31 PROCEDURE — 80048 BASIC METABOLIC PNL TOTAL CA: CPT

## 2019-08-31 PROCEDURE — 82962 GLUCOSE BLOOD TEST: CPT

## 2019-08-31 PROCEDURE — 99218 HC RM OBSERVATION: CPT

## 2019-08-31 PROCEDURE — 80061 LIPID PANEL: CPT

## 2019-08-31 PROCEDURE — 93306 TTE W/DOPPLER COMPLETE: CPT

## 2019-08-31 PROCEDURE — 74011250637 HC RX REV CODE- 250/637: Performed by: INTERNAL MEDICINE

## 2019-08-31 PROCEDURE — 74011250636 HC RX REV CODE- 250/636: Performed by: INTERNAL MEDICINE

## 2019-08-31 PROCEDURE — 93880 EXTRACRANIAL BILAT STUDY: CPT

## 2019-08-31 PROCEDURE — 36415 COLL VENOUS BLD VENIPUNCTURE: CPT

## 2019-08-31 PROCEDURE — 96372 THER/PROPH/DIAG INJ SC/IM: CPT

## 2019-08-31 RX ADMIN — Medication 1000 UNITS: at 09:03

## 2019-08-31 RX ADMIN — HEPARIN SODIUM 5000 UNITS: 5000 INJECTION INTRAVENOUS; SUBCUTANEOUS at 05:26

## 2019-08-31 RX ADMIN — DONEPEZIL HYDROCHLORIDE 10 MG: 10 TABLET, FILM COATED ORAL at 21:28

## 2019-08-31 RX ADMIN — CETIRIZINE HYDROCHLORIDE 10 MG: 10 TABLET, FILM COATED ORAL at 09:03

## 2019-08-31 RX ADMIN — Medication 10 ML: at 19:16

## 2019-08-31 RX ADMIN — HEPARIN SODIUM 5000 UNITS: 5000 INJECTION INTRAVENOUS; SUBCUTANEOUS at 12:21

## 2019-08-31 RX ADMIN — PRAVASTATIN SODIUM 40 MG: 40 TABLET ORAL at 21:28

## 2019-08-31 RX ADMIN — Medication 10 ML: at 05:27

## 2019-08-31 RX ADMIN — HEPARIN SODIUM 5000 UNITS: 5000 INJECTION INTRAVENOUS; SUBCUTANEOUS at 21:29

## 2019-08-31 RX ADMIN — Medication 10 ML: at 21:29

## 2019-08-31 NOTE — PROGRESS NOTES
Bedside shift change report given to Francisco Lino RN (oncoming nurse) by Irlanda Lux RN (offgoing nurse). Report included the following information SBAR, Kardex, Intake/Output, MAR, Recent Results and Cardiac Rhythm NSR/SA.

## 2019-08-31 NOTE — PROGRESS NOTES
Bedside shift change report given to Tim Stevens (oncoming nurse) by Kvng Bianchi (offgoing nurse). Report included the following information SBAR, ED Summary, Intake/Output, MAR, Accordion and Cardiac Rhythm Sinus Arrythmia/NSR.

## 2019-08-31 NOTE — PROGRESS NOTES
0000: Report received from [place], [place]. Shift Summary: No acute issues overnight. Patient remains oriented to self, disoriented to time/place/situation. Patient thinks he is at [place] at the store. Unable to state year, though he knows his birthday. Poor safety awareness; bed alarm on. Per report, patient had removed one of his IVs. Patient repeatedly removing EKG leads - placed on back so out of patient's reach. Patient assisted to side of bed to use urinal - no complaints of dizziness. Hemodynamics stable. 6223: Bedside shift change report given to [facility] [address] RNs (oncoming nurse) by Jazmin Delgado (offgoing nurse). Report included the following information SBAR, Intake/Output, MAR, Recent Results and Cardiac Rhythm NSR/Sinus arrythmia.

## 2019-08-31 NOTE — PROGRESS NOTES
[address] Cardiology Consult         [address], [address], [address]   [phone] fax [phone]    Name: Paloma Lacey  1935 [id number]  8/31/2019 3:26 PM      Pt feels well today, wife at bedside. Had uneventful night. Responded to IVF> stable. Nothing further at this time. Assessment/Plan:    1. Presyncope   - EKG without ACS or arrythmia   - CXR without edema or effusion   - Trop 0.05   - First BP in ED 95/66   - BUN/Cr a bit elevated - 28/1.85.  ->1.45 this am    Baseline 25/1.5   - per wife, recent h/o unsteadiness with standing   - echo ordered   - suspect vasovagal/orthostasis. Agree with IVF, sx resolved now, would restart lisinopril at 2.5mg daily and ok to dc from cardiac standpoint. 2. Dementia   - advancing   - h/o head trauma years ago   - Aricept PTA  3. CAD   - s/p stenting in 2008 - Lcx in [facility] 20 PTA  4. HLD -   Cholesterol, total 153 06/26/2017 11:06 AM   HDL Cholesterol 48 06/26/2017 11:06 AM   LDL, calculated 75 06/26/2017 11:06 AM   VLDL, calculated 30 06/26/2017 11:06 AM   Triglyceride 150 (H) 06/26/2017 11:06 AM   CHOL/HDL Ratio 2.6 11/03/2010 11:05 AM    - Pravachol PTA  5. Carotid stenosis   - Carotid duplex (2014): Left 50-79%, right 10-49%. - Primary team repeating duplex  6. DEREK on CKD   - stage III   - IVF  7. BMI 36   - Obese. He has been eating more and doing less   - d/w his wife about increasing activities for him     Active Problems:    Uncontrolled type II diabetes mellitus ([facility] [address] (2/14/2013)      Pre-syncope (8/30/2019)      DEREK (acute kidney injury) ([facility] [address] (8/30/2019)      Admit Date: 8/30/2019     Admit Diagnosis: Pre-syncope [R55]  Primary Care Physician:Ian Perez MD     Attending Provider: Kathe Essex, MD  Primary Cardiologist: Dr. Shyam Tijerina Cardiologist: Dr. Adina Ashraf  Requesting Provider: Dr. Denise Howell: SOB    Subjective:    Paloma Lacey is a 80 y.o. male admitted for Pre-syncope [R55]. Accompanied today by his wife. Mr. Maciej Cotton has progressive dementia, he is a poor historian. His wife provides history. She states they were at home today, her  was in another room and she heard a thump. She found him sitting in a chair. She suspects he felt wobbly and was able to fall into a chair. She herself did not notice any SOB, her neighbor was present and declared him SOB. She relates he has been unsteady when standing from a seated position, as of recent. No falls or passing out, but definitely unsteady. She notes he does not drink very much during the day, but has \"too good\" of an appetite. No c/o CP with this episode, but noticed some diaphoresis and a bit fatigued. Review of Symptoms:  A comprehensive review of systems was negative except for that written in the HPI. Previous treatment/evaluation includes Percutaneous Coronary Intervention, echocardiogram and persantine thallium . Cardiac risk factors: dyslipidemia, obesity, sedentary life style, male gender, hypertension.     Past Medical History:   Diagnosis Date    Advance directive discussed with patient 05/13/2015,05/25/2016    Anemia 6/16/2009    Anemia NEC     Arthritis 6/16/2009    BPH (benign prostatic hyperplasia)     CAD (coronary artery disease)     CAD (coronary artery disease) of artery bypass graft 6/16/2009    dr faye Vázquez or associate Dr Louann Donohue Carotid stenosis     Chronic pain     back    Concussion syndrome     DM (diabetes mellitus) ([facility] [address]     Edema     Gait disturbance     Glaucoma suspect 12/04/14    VEI notes Darline Gums    Headache due to trauma 1/2012    hit by a device on a door    Headache(784.0)     Hearing difficulty     Hypercholesterolemia     Hypertension 6/16/2009    Kidney stone 7/10    dr It[facility] styles [facility] lith[facility]ri[facility]    Microalbuminuria 2/2013    Mild memory disturbance Devonte Red also    neuro associates Alisia Mckenna     Nasal septal deviation  Pain in back 6/16/2009    Screening for diabetic retinopathy 7/18/16    exam neg for retinopathy Stiven'/Faulkner    Sleep apnea     Vitamin D deficiency      Past Surgical History:   Procedure Laterality Date    CARDIAC SURG PROCEDURE UNLIST      stent 2008    ENDOSCOPY, COLON, DIAGNOSTIC      in [place]- pt thinks in 2006    HX BACK SURGERY      HX CORONARY STENT PLACEMENT      HX HEART CATHETERIZATION      HX HEENT      left cataract    HX ORTHOPAEDIC      back multiple times    HX OTHER SURGICAL  12/2012    temporal  artery bx    HX OTHER SURGICAL  4/23/14    lower ext doppler    HX OTHER SURGICAL  4/1/16    echo report EF 60%    HX PTCA       Current Facility-Administered Medications   Medication Dose Route Frequency    sodium chloride (NS) flush 5-40 mL  5-40 mL IntraVENous Q8H    sodium chloride (NS) flush 5-40 mL  5-40 mL IntraVENous PRN    0.9% sodium chloride infusion  75 mL/hr IntraVENous CONTINUOUS    acetaminophen (TYLENOL) tablet 650 mg  650 mg Oral Q4H PRN    ondansetron (ZOFRAN) injection 4 mg  4 mg IntraVENous Q6H PRN    glucose chewable tablet 16 g  4 Tab Oral PRN    glucagon (GLUCAGEN) injection 1 mg  1 mg IntraMUSCular PRN    insulin lispro (HUMALOG) injection   SubCUTAneous AC&HS    vitamin B complex tablet  1 Tab Oral EVERY OTHER DAY    cetirizine (ZYRTEC) tablet 10 mg  10 mg Oral 7am    cholecalciferol (VITAMIN D3) tablet 1,000 Units  1,000 Units Oral DAILY    donepezil (ARICEPT) tablet 10 mg  10 mg Oral QHS    pravastatin (PRAVACHOL) tablet 40 mg  40 mg Oral QHS    heparin (porcine) injection 5,000 Units  5,000 Units SubCUTAneous Q8H       No Known Allergies   Family History   Problem Relation Age of Onset    Heart Disease Mother     Heart Disease Father     Cancer Sister     Dementia Sister     Cancer Brother         leukemia    Arthritis-osteo Brother     Migraines Brother     Migraines Child     Arthritis-osteo Child       Social History Socioeconomic History    Marital status:      Spouse name: Not on file    Number of children: Not on file    Years of education: Not on file    Highest education level: Not on file   Tobacco Use    Smoking status: Former Smoker     Packs/day: 0.50     Years: 30.00     Pack years: 15.00     Types: Cigarettes     Last attempt to quit: 2014     Years since quittin.1    Smokeless tobacco: Never Used   Substance and Sexual Activity    Alcohol use: No     Comment: occassionally    Drug use: No    Sexual activity: Yes     Partners: Female     Birth control/protection: None          Objective:      Physical Exam  Vitals:    19 2137 19 0211 19 0527 19 0953   BP: 114/49 (!) 138/98 150/70 146/64   Pulse: 70 63 68 74   Resp: 21 18 20 17   Temp: 97.8 °F (36.6 °C) 98 °F (36.7 °C) 98.1 °F (36.7 °C) 97.8 °F (36.6 °C)   SpO2: 96% 96% 95%    Weight:  224 lb 8 oz (101.8 kg)         General:  Alert, cooperative, no distress, appears stated age. Eyes:  Conjunctivae/corneas clear. PERRL, EOMs intact. Ears:  Normal external ear canals both ears. Nose:  No drainage or sinus tenderness. Mouth/Throat: Moist mucous membranes. Dentition normal.    Neck: Symmetrical, trachea midline, no carotid bruit and no JVD. Back:   No CVA tenderness   Lungs:   Clear to auscultation bilaterally. Heart:  Regular rate and rhythm, S1, S2 normal, no murmur, click, rub or gallop. Abdomen:   Soft, non-tender. Bowel sounds normal. No masses,  No organomegaly. Extremities: Extremities  atraumatic, no cyanosis or edema. Vascular: 2+ and symmetric UE/LE bilat   Skin: Skin color normal. No rashes or lesions   Lymph nodes: No Lymphadenopathy   Neurologic: CNII-XII intact.  Normal strength        Telemetry: normal sinus rhythm  ECG: normal EKG, normal sinus rhythm, unchanged from previous tracings  Echocardiogram: pending    Data Review:     Recent Labs     19  1311   TROIQ <0.05     Recent Labs 08/31/19 0224 08/30/19  1311    139   K 4.4 4.5   * 108   CO2 28 27   BUN 25* 28*   CREA 1.47* 1.85*   * 219*   CA 8.8 9.1     Recent Labs     08/31/19 0224 08/30/19  1311   WBC 7.5 6.7   HGB 10.3* 11.3*   HCT 35.0* 38.4    254     Recent Labs     08/30/19  1311   SGOT 10*   AP 80     Recent Labs     08/31/19  0224   CHOL 141   LDLC 83.2     No results for input(s): CRP, TSH, TSHEXT, TSHEXT in the last 72 hours. No lab exists for component: ESR  Thank you very much for this referral. I appreciate the opportunity to participate in this patient's care. Treating for dehydration as cause of syncope. Bp and hr are improving with IVF in the ER.    MD João Casanova MD

## 2019-08-31 NOTE — PROGRESS NOTES
Problem: Pressure Injury - Risk of  Goal: *Prevention of pressure injury  Description  Document Ildefonso Scale and appropriate interventions in the flowsheet. Outcome: Progressing Towards Goal  Note:   Pressure Injury Interventions:  Sensory Interventions: Keep linens dry and wrinkle-free, Assess changes in LOC, Turn and reposition approx. every two hours (pillows and wedges if needed), Maintain/enhance activity level, Minimize linen layers    Moisture Interventions: Offer toileting Q_hr, Absorbent underpads    Activity Interventions: Pressure redistribution bed/mattress(bed type), PT/OT evaluation    Mobility Interventions: Pressure redistribution bed/mattress (bed type), Turn and reposition approx. every two hours(pillow and wedges), PT/OT evaluation    Nutrition Interventions: Document food/fluid/supplement intake    Friction and Shear Interventions: Minimize layers, Transferring/repositioning devices, Lift sheet                Problem: Syncope  Goal: *Absence of injury  Outcome: Progressing Towards Goal     Problem: Falls - Risk of  Goal: *Absence of Falls  Description  Document Janette Gilman Fall Risk and appropriate interventions in the flowsheet.   Outcome: Not Progressing Towards Goal  Note:   Fall Risk Interventions:  Mobility Interventions: Patient to call before getting OOB, Bed/chair exit alarm, OT consult for ADLs    Mentation Interventions: Reorient patient, More frequent rounding, Toileting rounds, Bed/chair exit alarm    Medication Interventions: Teach patient to arise slowly, Patient to call before getting OOB, Bed/chair exit alarm    Elimination Interventions: Bed/chair exit alarm, Toilet paper/wipes in reach, Urinal in reach

## 2019-08-31 NOTE — PROGRESS NOTES
Hospitalist Progress Note                               Mariann Sanchez MD                                     Answering service: [phone]                               [phone] from in house phone                                         Date of Service:  2019  NAME:  Shekhar Lloyd  :  1935  MRN:  [id number]      Admission Summary:   83-year gentleman with a past medical history significant for primary hypertension, non-insulin treated type 2 diabetes mellitus, benign prostatic hypertrophy, coronary artery disease, anemia, arthritis, and nephrolithiasis, was brought to the emergency room from home via private vehicle for complaints of shortness of breath, increased sweating, and near fainting. Per patient's wife, whilst doing some household chores, heard a \"thump\" and thereafter found the patient sitting in a chair, short of breath with increased perspiration. The patient denied any loss of consciousness, dizziness, headaches, visual deficits, chest pains, palpitations, nausea, vomiting, cough, fevers, chills, bladder or bowel irregularities. Reason for follow up:     CC: SOB, increased sweating and near fainting. Patient appeared comfortable lying in bed on early am rounds. Denied any headaches, dizziness, chest pains or SOB. Assessment & Plan:     1) CVS: Probable Pre-Syncope due to Vasovagal event with orthostasis. CAD with H/O PCI. No acute ischemia or dysrhythmias. Fairly well controlled primary hypertension. Dyslipidemia. IVFs, Orthostatic readings, 2D ECHO. Cardiology eval noted and appreciated. 2) Vascular: Known B/L Carotid artery stenoses. Carotid  Dopplers with 50-69 percent R. ICA and borderline 70 percent L. ICA stenoses. 3) Renal: DEREK on probable CKD stage 3 due to dehydration, some acute tubular necrosis, and probable medication side effect. Hold Nephrotoxic medications, IVFs, trend BUN/Cr.     4) Hematology: Probable anemia of chronicity. 5) Endocrine: Non-Insulin treated Type 2 DM. A1C 6.5 Hold Metformin. Accucheck monitoring, sliding scale insulin, Diabetic teaching. 6) : H/O BPH. H/O Nephrolithiasis. 7) EVA: Probable Osteoarthritis. 8) Prophylaxis: DVT. 9) Directives: Full Code with a guarded prognosis. D/W patient and patient's wife. 10) Plan: Anticipate D/C to home within 48 hours. D/W patient and patient's wife Scotty Lopez who can be reached at Cell: [phone]. Hospital Problems  Date Reviewed: 8/30/2019          Codes Class Noted POA    Pre-syncope ICD-10-CM: R55  ICD-9-CM: 780.2  8/30/2019 Yes        [facility] ICD-10-CM: N17.9  ICD-9-CM: 584.9  8/30/2019 Yes        Uncontrolled type II diabetes mellitus ([facility] ICD-10-CM: E11.65  ICD-9-CM: 250.02  2/14/2013 Yes                  Physical Examination:      Last 24hrs VS reviewed since prior progress note. Most recent are:  Visit Vitals  /82 (BP 1 Location: Right arm, BP Patient Position: At rest)   Pulse 64   Temp 97.7 °F (36.5 °C)   Resp 16   Wt 101.8 kg (224 lb 8 oz)   SpO2 95%   BMI 33.15 kg/m²           Constitutional:  No acute distress, cooperative, pleasant    HEENT: Head is a traumatic,  Un icteric sclera. Pink conjunctiva,no erythema or discharge. Oral mucous moist, oropharynx benign. Neck supple,    Resp:  Decreased air entry bibasilarly. CV:  Regular rhythm, normal rate, no murmurs, gallops, rubs    GI:  Soft, non distended, non tender. normoactive bowel sounds, no hepatosplenomegaly    :  No CVA or suprapubic tenderness   Skin  :  No erythema,rash,bullae,dipigmentation     Musculoskeletal:  Bipedal edema. Neurologic:  Awake and alert.              Intake/Output Summary (Last 24 hours) at 8/31/2019 1418  Last data filed at 8/31/2019 0230  Gross per 24 hour   Intake    Output 250 ml   Net -250 ml            Labs:     Recent Labs     08/31/19  0224 08/30/19  1311   WBC 7.5 6.7   HGB 10.3* 11.3*   HCT 35.0* 38.4    254     Recent Labs     08/31/19  0224 08/30/19  1311    139   K 4.4 4.5   * 108   CO2 28 27   BUN 25* 28*   CREA 1.47* 1.85*   * 219*   CA 8.8 9.1     Recent Labs     08/30/19  1311   SGOT 10*   ALT 13   AP 80   TBILI 0.4   TP 7.3   ALB 3.8   GLOB 3.5     No results for input(s): INR, PTP, APTT in the last 72 hours. No lab exists for component: INREXT   No results for input(s): FE, TIBC, PSAT, FERR in the last 72 hours. Lab Results   Component Value Date/Time    Folate 9.7 09/10/2015 11:17 AM      No results for input(s): PH, PCO2, PO2 in the last 72 hours.   Recent Labs     08/30/19  1311   TROIQ <0.05     Lab Results   Component Value Date/Time    Cholesterol, total 141 08/31/2019 02:24 AM    HDL Cholesterol 44 08/31/2019 02:24 AM    LDL, calculated 83.2 08/31/2019 02:24 AM    Triglyceride 69 08/31/2019 02:24 AM    CHOL/HDL Ratio 3.2 08/31/2019 02:24 AM     Lab Results   Component Value Date/Time    Glucose (POC) 101 (H) 08/31/2019 12:11 PM    Glucose (POC) 124 (H) 08/31/2019 07:02 AM    Glucose (POC) 182 (H) 08/30/2019 09:02 PM    Glucose (POC) 118 (H) 07/19/2010 04:46 PM    Glucose (POC) 126 (H) 07/19/2010 12:02 PM     Lab Results   Component Value Date/Time    Color YELLOW/STRAW 08/30/2019 02:00 PM    Appearance CLEAR 08/30/2019 02:00 PM    Specific gravity 1.014 08/30/2019 02:00 PM    pH (UA) 6.0 08/30/2019 02:00 PM    Protein NEGATIVE  08/30/2019 02:00 PM    Glucose NEGATIVE  08/30/2019 02:00 PM    Ketone NEGATIVE  08/30/2019 02:00 PM    Bilirubin NEGATIVE  08/30/2019 02:00 PM    Urobilinogen 0.2 08/30/2019 02:00 PM    Nitrites NEGATIVE  08/30/2019 02:00 PM    Leukocyte Esterase TRACE (A) 08/30/2019 02:00 PM    Epithelial cells FEW 08/30/2019 02:00 PM    Bacteria NEGATIVE  08/30/2019 02:00 PM    WBC 0-4 08/30/2019 02:00 PM    RBC 0-5 08/30/2019 02:00 PM         Medications Reviewed:     Current Facility-Administered Medications   Medication Dose Route Frequency    sodium chloride (NS) flush 5-40 mL  5-40 mL IntraVENous Q8H    sodium chloride (NS) flush 5-40 mL  5-40 mL IntraVENous PRN    0.9% sodium chloride infusion  75 mL/hr IntraVENous CONTINUOUS    acetaminophen (TYLENOL) tablet 650 mg  650 mg Oral Q4H PRN    ondansetron (ZOFRAN) injection 4 mg  4 mg IntraVENous Q6H PRN    glucose chewable tablet 16 g  4 Tab Oral PRN    glucagon (GLUCAGEN) injection 1 mg  1 mg IntraMUSCular PRN    insulin lispro (HUMALOG) injection   SubCUTAneous AC&HS    vitamin B complex tablet  1 Tab Oral EVERY OTHER DAY    cetirizine (ZYRTEC) tablet 10 mg  10 mg Oral 7am    cholecalciferol (VITAMIN D3) tablet 1,000 Units  1,000 Units Oral DAILY    donepezil (ARICEPT) tablet 10 mg  10 mg Oral QHS    pravastatin (PRAVACHOL) tablet 40 mg  40 mg Oral QHS    heparin (porcine) injection 5,000 Units  5,000 Units SubCUTAneous Q8H     ______________________________________________________________________  EXPECTED LENGTH OF STAY: - - -  ACTUAL LENGTH OF STAY:          1                 Lena Jay MD

## 2019-08-31 NOTE — PROGRESS NOTES
Bedside shift change report given to Nicolette (oncoming nurse) by Carlos Cespedes (offgoing nurse). Report included the following information SBAR, ED Summary, MAR, Accordion and Cardiac Rhythm NSR.

## 2019-09-01 ENCOUNTER — HOME HEALTH ADMISSION (OUTPATIENT)
Dept: HOME HEALTH SERVICES | Facility: HOME HEALTH | Age: 84
End: 2019-09-01
Payer: MEDICARE

## 2019-09-01 VITALS
BODY MASS INDEX: 30.69 KG/M2 | HEART RATE: 77 BPM | TEMPERATURE: 98.7 F | DIASTOLIC BLOOD PRESSURE: 84 MMHG | OXYGEN SATURATION: 94 % | WEIGHT: 226.6 LBS | RESPIRATION RATE: 19 BRPM | HEIGHT: 72 IN | SYSTOLIC BLOOD PRESSURE: 123 MMHG

## 2019-09-01 LAB
ECHO AO ROOT DIAM: 2.5 CM
ECHO AV PEAK GRADIENT: 19 MMHG
ECHO AV PEAK VELOCITY: 217.9 CM/S
ECHO LA MAJOR AXIS: 3.65 CM
ECHO LA TO AORTIC ROOT RATIO: 1.46
ECHO LV E' SEPTAL VELOCITY: 5.77 CM/S
ECHO LV INTERNAL DIMENSION DIASTOLIC: 4.45 CM (ref 4.2–5.9)
ECHO LV INTERNAL DIMENSION SYSTOLIC: 3.3 CM
ECHO LV IVSD: 1.14 CM (ref 0.6–1)
ECHO LV MASS 2D: 158.6 G (ref 88–224)
ECHO LV MASS INDEX 2D: 70.9 G/M2 (ref 49–115)
ECHO LV POSTERIOR WALL DIASTOLIC: 0.74 CM (ref 0.6–1)
ECHO MV A VELOCITY: 99.01 CM/S
ECHO MV AREA PHT: 2.4 CM2
ECHO MV E DECELERATION TIME (DT): 317.4 MS
ECHO MV E VELOCITY: 78.81 CM/S
ECHO MV E/A RATIO: 0.8
ECHO MV E/E' SEPTAL: 13.66
ECHO MV PRESSURE HALF TIME (PHT): 92.1 MS
ECHO PULMONARY ARTERY SYSTOLIC PRESSURE (PASP): 45 MMHG
ECHO TV REGURGITANT MAX VELOCITY: 376.1 CM/S
ECHO TV REGURGITANT PEAK GRADIENT: 56.6 MMHG
GLUCOSE BLD STRIP.AUTO-MCNC: 119 MG/DL (ref 65–100)
SERVICE CMNT-IMP: ABNORMAL

## 2019-09-01 PROCEDURE — 96372 THER/PROPH/DIAG INJ SC/IM: CPT

## 2019-09-01 PROCEDURE — 82962 GLUCOSE BLOOD TEST: CPT

## 2019-09-01 PROCEDURE — 99218 HC RM OBSERVATION: CPT

## 2019-09-01 PROCEDURE — 74011250637 HC RX REV CODE- 250/637: Performed by: INTERNAL MEDICINE

## 2019-09-01 PROCEDURE — 74011250636 HC RX REV CODE- 250/636: Performed by: INTERNAL MEDICINE

## 2019-09-01 RX ORDER — LISINOPRIL 2.5 MG/1
2.5 TABLET ORAL DAILY
Qty: 30 TAB | Refills: 2 | Status: SHIPPED | OUTPATIENT
Start: 2019-09-01 | End: 2019-01-01 | Stop reason: SDUPTHER

## 2019-09-01 RX ADMIN — HEPARIN SODIUM 5000 UNITS: 5000 INJECTION INTRAVENOUS; SUBCUTANEOUS at 06:28

## 2019-09-01 RX ADMIN — CETIRIZINE HYDROCHLORIDE 10 MG: 10 TABLET, FILM COATED ORAL at 06:28

## 2019-09-01 RX ADMIN — Medication 10 ML: at 06:28

## 2019-09-01 RX ADMIN — Medication 1000 UNITS: at 09:00

## 2019-09-01 NOTE — PROGRESS NOTES
I have reviewed discharge instructions with the patient and caregiver. The patient and caregiver verbalized understanding. Discharge medications reviewed with patient and caregiver and appropriate educational materials and side effects teaching were provided. Signed copy placed on chart. PIV removed. Patient being wheeled downstairs by staff member. Patient receiving transportation home via his daughter.

## 2019-09-01 NOTE — PROGRESS NOTES
Referral has been made to [facility]ie [facility] for [facility] needs. Confirmed demographics and PCP with spouse.     Laura Velasco RN

## 2019-09-01 NOTE — DISCHARGE SUMMARY
[facility] SUMMARY    Name:  Licha Pittman  MR#:  [id number]  :  1935  ACCOUNT #:  [de-identified]  ADMIT DATE:  2019  DISCHARGE DATE:  2019      MAIN DIAGNOSES ON ADMISSION:  1. Presyncope, probably due to vasovagal event versus orthostasis. 2.  Acute kidney injury on probable chronic kidney disease stage III due to dehydration, some acute tubular necrosis and probable medication side effect. CONSULTANTS:  1. Cardiology. HOSPITAL COURSE:  This is an 25-year-old gentleman with a past medical history significant for primary hypertension, non-insulin treated type 2 diabetes mellitus, benign prostatic hypertrophy, coronary artery disease, anemia, arthritis, nephrolithiasis, was brought to the emergency room from home via private vehicle for complaints of shortness of breath, increased sweating and near fainting. Per the patient's wife, whilst doing some household chores, heard a \"thump\" and thereafter found the patient sitting in a chair short of breath with increased perspiration. The patient denied any loss of consciousness, dizziness, headaches, visual deficits, chest pains, palpitations, nausea, vomiting, cough, fevers, chills, bladder or bowel irregularities. The patient was admitted to the neuro tele floor and on further evaluation with a 12-lead EKG that was personally reviewed and showed normal sinus rhythm with some sinus arrhythmia at 77 per minute. No acute ST or T-wave abnormalities. A chest x-ray that showed some elevated left hemidiaphragm, no other acute abnormalities. CBC with a WBC of 6.7, hemoglobin 11.3, hematocrit 38.4 with a platelet count of 927. Metabolic panel with a sodium of 139, potassium 4.5, chloride 108, a bicarb of 27, BUN of 28, creatinine 1.85, glucose 219, calcium 9.1. First troponin less than 0.05.   Initial management included the patient being treated with IV fluids, orthostatic reading checks, neuro checks, holding of all nephrotoxic medications, trending of BUN and creatinine. Further, the patient was evaluated by the Cardiology Service along with review of a 2D echocardiogram with findings of some mild to moderate pulmonary hypertension, ejection fraction 56-60%. The patient with known bilateral internal carotid artery stenosis and was evaluated further with carotid Doppler studies that showed 50-69% stenosis in the right internal carotid artery and borderline 70% in the left internal carotid artery. The patient had no focal neurologic deficits throughout the hospital stay. Metformin therapy was held. The patient underwent Accu-Chek monitoring with sliding scale insulin, daily diabetic teaching. Recommendations from the Cardiology Service was for the lisinopril to be decreased to 2.5 mg p.o. daily on discharge. The patient was able to tolerate an advancing heart healthy diabetic diet, was able to ambulate with the aid of his cane. On further discussion with the patient's wife and daughter, it was deemed that the patient would benefit from home health nursing 2-week protocol for monitoring of the chronic medical conditions on discharge. After being cleared by the Cardiology Service and with clinical and chemical stability, the patient was deemed stable for discharge to home on this day 09/01/2019. PHYSICAL EXAMINATION:  Examination findings on this day of discharge,  GENERAL:  The patient is awake, alert, denying new complaints. Blood pressure 123/84, heart rate 77, respiratory rate 19, afebrile, saturating 94% on room air. CARDIOVASCULAR EXAM:  S1, S2 present. RESPIRATORY EXAM:  Lungs clear bilaterally. GI EXAM:  Bowel sounds present. The abdomen is soft, nontender. No rebound, no guarding. GENITOURINARY EXAM:  No suprapubic or flank tenderness. MUSCULOSKELETAL EXAM:  No asymmetry of the extremities. CNS EXAM:  The patient is awake, alert, oriented to place and person.     LABORATORY DATA:  Lab findings on discharge are as follows:  CBC, WBC 7.5, hemoglobin 10.3, hematocrit 35, platelet count 222. Metabolic panel, sodium 267, potassium 4.4, chloride 110, bicarb 28, BUN of 25, creatinine 1.47, glucose 118, calcium 8.8, total cholesterol 141, triglycerides 69, HDL of 44, LDL of 83.2. A1c of 6.5. FINAL DIAGNOSES ON DISCHARGE:  1.  Probable presyncope due to vasovagal event versus orthostasis. No acute ischemia or dysrhythmias. 2.  Fairly well-controlled primary hypertension. 3.  Dyslipidemia. 4.  Known bilateral internal carotid artery stenosis. 5.  Resolving acute kidney injury on probable chronic kidney disease stage III due to dehydration and some acute tubular necrosis and probable medication side effects. 6.  Probable anemia of chronicity. 7.  Non-insulin treated type 2 diabetes mellitus with an A1c of 6.5.  8. History of benign prostatic hypertrophy. 9.  History of nephrolithiasis. 10.  Probable osteoarthritis. DISCHARGE MEDICATIONS:  As follow:  1. Lisinopril changed from 5 mg to 2.5 mg p.o. daily. 2.  B complex 1 tablet p.o. every other day. 3.  Cholecalciferol 1000 units p.o. every morning. 4.  Donepezil 10 mg p.o. nightly. 5.  Metformin 1000 mg p.o. every evening. 6.  Pravachol 40 mg p.o. nightly. 7.  Zyrtec 10 mg p.o. every morning. The patient instructed to follow a heart healthy diabetic diet, to do daily activities with the aid of a cane as tolerated, to keep adequately hydrated at all times. The patient to have home health nursing arrangements in place for monitoring of his chronic medical conditions. The patient is to follow up with PCP in one week's time from discharge and his cardiologist, Dr. Krista Quintero in 1-2 weeks' time from discharge. Recommendations for the patient to have a followup metabolic panel for monitoring of the kidney function being on metformin and ACE inhibitor therapy with next primary care office visit.   The patient is stable for discharge to home this day 09/01/2019. The entire discharge plans, including all medications, their side effects, the importance of adhering to all outpatient followup plans discussed in detail with the patient and the patient's wife, Eleni Bassett, who can be reached at cell [phone]. All questions and concerns were addressed. Total time for the discharge summary 50 minutes.       Jeanette Child MD      [id number]  D:  09/01/2019 10:48  T:  09/01/2019 10:52  JOB #:  [id number]

## 2019-09-01 NOTE — PROGRESS NOTES
Problem: Pressure Injury - Risk of  Goal: *Prevention of pressure injury  Description  Document Ildefonso Scale and appropriate interventions in the flowsheet. Outcome: Progressing Towards Goal  Note:   Pressure Injury Interventions:  Sensory Interventions: Maintain/enhance activity level, Minimize linen layers, Keep linens dry and wrinkle-free, Assess changes in LOC    Moisture Interventions: Minimize layers, Offer toileting Q_hr    Activity Interventions: Pressure redistribution bed/mattress(bed type), Increase time out of bed    Mobility Interventions: Turn and reposition approx.  every two hours(pillow and wedges), Pressure redistribution bed/mattress (bed type)    Nutrition Interventions: Document food/fluid/supplement intake    Friction and Shear Interventions: Lift sheet, Transferring/repositioning devices                Problem: Patient Education: Go to Patient Education Activity  Goal: Patient/Family Education  Outcome: Progressing Towards Goal     Problem: Syncope  Goal: *Absence of injury  Outcome: Progressing Towards Goal  Goal: Decrease or eliminate episodes of syncope  Outcome: Progressing Towards Goal     Problem: Patient Education: Go to Patient Education Activity  Goal: Patient/Family Education  Outcome: Progressing Towards Goal

## 2019-09-01 NOTE — PHYSICIAN ADVISORY
Letter of Status Determination:   Recommend hospitalization status   OBSERVATION       Pt Name:  Blanche Tijerina   MR#   [address] [place] # [id number] /  [id number]  Payor: Farzad Newberry / Plan: Jessica Ang / Product Type: Medicare /    LISBETH#  [id number]   Room and Hospital  655/01  @ . Pending sale to [facility]   Hospitalization date  8/30/2019 12:45 PM   Current Attending Physician  No att. providers found   Principal diagnosis  Pre-syncope [R55]  Pre-syncope [R55]     Clinicals  80 y.o. y.o  male hospitalized with above diagnosis    80 y.o. male admitted for Pre-syncope [R55]. Accompanied today by his wife. Mr. 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 His wife provides history. She states they were at home today, her  was in another room and she heard a thump. She found him sitting in a chair. She suspects he felt wobbly and was able to fall into a chair. She herself did not notice any SOB, her neighbor was present and declared him SOB.   She relates he has been unsteady when standing from a seated position, as of recent     [place] (MCG) criteria   Does  NOT apply Presyncope , no CVA, no ACS   STATUS DETERMINATION  Observation        Additional comments     [place]or: [place] MED[place]ARE / Plan: [place] MEDICARE PART A & B / Product Type: Medicare /           Nilo Lux MD

## 2019-09-01 NOTE — DISCHARGE INSTRUCTIONS
Patient to follow a Heart healthy, Diabetic diet. Patient to do daily activities with the aid of a cane as tolerated. Patient to keep adequately hydrated at all times. Patient's Lisinopril to be changed from 5mg to 2.5mg daily. Patient to have HomeHealth nursing arrangements in place for monitoring of the chronic medical conditions. Patient to follow up with PCP in 1 week. Patient to follow up with cardiologist Dr Luna Cook in 1-2 weeks.

## 2019-09-02 LAB
LEFT CCA DIST DIAS: 17.6 CM/S
LEFT CCA DIST SYS: 96.5 CM/S
LEFT CCA PROX DIAS: 22.8 CM/S
LEFT CCA PROX SYS: 104.3 CM/S
LEFT ECA DIAS: 9.83 CM/S
LEFT ECA SYS: 103 CM/S
LEFT ICA DIST DIAS: 17 CM/S
LEFT ICA DIST SYS: 86 CM/S
LEFT ICA MID DIAS: 62.9 CM/S
LEFT ICA MID SYS: 229.9 CM/S
LEFT ICA PROX DIAS: 17.6 CM/S
LEFT ICA PROX SYS: 84.9 CM/S
LEFT ICA/CCA SYS: 2.38
LEFT VERTEBRAL DIAS: 9.83 CM/S
LEFT VERTEBRAL SYS: 73.2 CM/S
RIGHT CCA DIST DIAS: 17.6 CM/S
RIGHT CCA DIST SYS: 105.6 CM/S
RIGHT CCA PROX DIAS: 15 CM/S
RIGHT CCA PROX SYS: 92.7 CM/S
RIGHT ECA DIAS: 7.25 CM/S
RIGHT ECA SYS: 106.9 CM/S
RIGHT ICA DIST DIAS: 10 CM/S
RIGHT ICA DIST SYS: 61.2 CM/S
RIGHT ICA MID DIAS: 12.5 CM/S
RIGHT ICA MID SYS: 63 CM/S
RIGHT ICA PROX DIAS: 33.9 CM/S
RIGHT ICA PROX SYS: 140.8 CM/S
RIGHT ICA/CCA SYS: 1.3
RIGHT VERTEBRAL DIAS: 9.45 CM/S
RIGHT VERTEBRAL SYS: 46.8 CM/S

## 2019-09-03 ENCOUNTER — TELEPHONE (OUTPATIENT)
Dept: CARDIOLOGY CLINIC | Age: 84
End: 2019-09-03

## 2019-09-03 ENCOUNTER — PATIENT OUTREACH (OUTPATIENT)
Dept: FAMILY MEDICINE CLINIC | Age: 84
End: 2019-09-03

## 2019-09-03 ENCOUNTER — HOME CARE VISIT (OUTPATIENT)
Dept: SCHEDULING | Facility: HOME HEALTH | Age: 84
End: 2019-09-03
Payer: MEDICARE

## 2019-09-03 VITALS
TEMPERATURE: 98.7 F | SYSTOLIC BLOOD PRESSURE: 106 MMHG | DIASTOLIC BLOOD PRESSURE: 62 MMHG | HEART RATE: 78 BPM | OXYGEN SATURATION: 93 % | RESPIRATION RATE: 16 BRPM

## 2019-09-03 PROCEDURE — 3331090002 HH PPS REVENUE DEBIT

## 2019-09-03 PROCEDURE — 3331090001 HH PPS REVENUE CREDIT

## 2019-09-03 PROCEDURE — G0299 HHS/HOSPICE OF RN EA 15 MIN: HCPCS

## 2019-09-03 PROCEDURE — 400013 HH SOC
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
## 2019-09-03 NOTE — TELEPHONE ENCOUNTER
Hilary with [facility] calling to see if Dr. Octaviano Corea will follow the pt's home health care. She can be reached at [phone].     [employer]

## 2019-09-03 NOTE — TELEPHONE ENCOUNTER
Verified patient with two types of identifiers. Notified Hilary to please contact PCP for home health orders. Barb Lutz verbalized understanding and will call with any other questions.

## 2019-09-04 ENCOUNTER — TELEPHONE (OUTPATIENT)
Dept: FAMILY MEDICINE CLINIC | Age: 84
End: 2019-09-04

## 2019-09-04 PROCEDURE — 3331090002 HH PPS REVENUE DEBIT

## 2019-09-04 PROCEDURE — 3331090001 HH PPS REVENUE CREDIT

## 2019-09-04 NOTE — TELEPHONE ENCOUNTER
Writer called Makeda Rousseau with [facility] from Dr. Allison Sinha to see patient twice a week for 3 weeks then reassess. Makeda Rousseau did not answer. Writer left  requesting phone call back.

## 2019-09-04 NOTE — TELEPHONE ENCOUNTER
Writer called Kain Ortega back regarding patient. Writer spoke with Kain Ortega. Writer notified that Dr. Tawnya Lund is okay with patient being seen 2 times a week for 3 weeks. Kain Ortega verbalized understanding and appreciation.

## 2019-09-04 NOTE — TELEPHONE ENCOUNTER
Writer called Kim Chirinos with [facility] back. Writer spoke with Kim Chirinos. Kim Chirinos wants to see patient twice a week for about 3 weeks and then reassess. Wanted to make sure Dr. Dharmesh Hickman did not have a certain protocol or anything like that. Patient has an appointment next week with Dr. Dharmesh Hickman on 09/11/2019. Kim Chirinos would like to know if Dr. Dharmesh Hickman is okay with patient being seen 2 times a week for 3 weeks. Writer verbalized that message would be sent to Dr. Dharmesh Hickman to review, and she will given a call back as soon as possible. Kim Chirinos verbalized understanding and appreciation.

## 2019-09-04 NOTE — TELEPHONE ENCOUNTER
----- Message from Paul Thompson sent at 9/4/2019 12:43 PM EDT -----  Regarding: Leonila/ Telephone  Patient return call    Caller's first and last name and relationship (if not the patient):      Best contact number(s): Whose call is being returned: Lizzy Carlisle with Home health care       Details to clarify the request:Mariam is returning call for Jaye regarding Pt.       Paul Thompson

## 2019-09-04 NOTE — TELEPHONE ENCOUNTER
Krista Barry could you please call Nick Melgoza with [employer] she wanted to talk you about Mr Nely Omalley [phone] Nick Melgoza

## 2019-09-05 ENCOUNTER — OFFICE VISIT (OUTPATIENT)
Dept: CARDIOLOGY CLINIC | Age: 84
End: 2019-09-05

## 2019-09-05 VITALS
OXYGEN SATURATION: 98 % | BODY MASS INDEX: 30.88 KG/M2 | DIASTOLIC BLOOD PRESSURE: 72 MMHG | WEIGHT: 228 LBS | RESPIRATION RATE: 14 BRPM | HEART RATE: 68 BPM | HEIGHT: 72 IN | SYSTOLIC BLOOD PRESSURE: 110 MMHG

## 2019-09-05 DIAGNOSIS — E66.01 SEVERE OBESITY (HCC): ICD-10-CM

## 2019-09-05 DIAGNOSIS — I10 ESSENTIAL HYPERTENSION: ICD-10-CM

## 2019-09-05 DIAGNOSIS — N18.9 CHRONIC KIDNEY DISEASE, UNSPECIFIED CKD STAGE: ICD-10-CM

## 2019-09-05 DIAGNOSIS — R55 NEAR SYNCOPE: Primary | ICD-10-CM

## 2019-09-05 DIAGNOSIS — I25.10 CORONARY ARTERY DISEASE INVOLVING NATIVE CORONARY ARTERY OF NATIVE HEART WITHOUT ANGINA PECTORIS: ICD-10-CM

## 2019-09-05 PROCEDURE — 3331090001 HH PPS REVENUE CREDIT

## 2019-09-05 PROCEDURE — 3331090002 HH PPS REVENUE DEBIT
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
## 2019-09-05 NOTE — PROGRESS NOTES
Cardiac Electrophysiology OFFICE Note     Subjective: Cameron Gil is a 80 y.o. patient who is seen for follow up after recent hospitalization before Labor Day. He was admitted a week ago with presyncope, SOB, diaphoresis. EKG without ACS or arrhythmia, trop 0.05. Initial BP in ER 95/66, suspected vasovagal or orthostasis. Responded well to IV fluids, held lisinopril. DEREK on CKD, improved to baseline after IVF. His wife and daughter who moved back to [place] to help said there is dementia and he does not remember to drink enough  He likes tea and lemonade but not water  He denies lightheadedness since returning home, has resumed lisinopril at a lower dose. He denies chest pain, palpitations, PND, orthopnea, SOB, lightheadedness, or syncope. Mild lower extremity edema. BP well controlled today, 110/72. Diabetes has been well controlled recently. Accompanied by wife & daughter today. They state he will be seeing his PCP soon for follow up, will be getting referral for nephrology. His daughter sees Dr Harleen Anderson    Previous:  Echo (08/31/2019): LV 56-60%, no RWMA, mild concentric LVH. Mild TR, PASP 45 mmHg, mild to mod pulmonary HTN. Carotid duplex (08/31/2019): FRANKIE 50-69%, borderline 35% stenosis of LICA. Lexiscan cardiolite stress (11/2014): PACs, incomplete RBBB. Diaphragmatic attenuation artifact, more on rest than stress. Inferior wall abnormal, demonstrates small size, mild intensity defect seen on basal inferior wall on rest images only. Head trauma years ago.       Problem List  Date Reviewed: 9/5/2019          Codes Class Noted    Pre-syncope ICD-10-CM: R55  ICD-9-CM: 780.2  8/30/2019        DEREK (acute kidney injury) ([facility] ICD-10-CM: N17.9  ICD-9-CM: 584.9  8/30/2019        Severe obesity ([facility] ICD-10-CM: E66.01  ICD-9-CM: 278.01  2/25/2019        Mood disorder ([facility] ICD-10-CM: B02  ICD-9-CM: 296.90  7/16/2018        Cervical post-laminectomy syndrome ICD-10-CM: M96.1  ICD-9-CM: 722.81  10/6/2015        Cervical spondylosis with myelopathy and radiculopathy ICD-10-CM: M47.12, M47.22  ICD-9-CM: 721.1  10/6/2015        Spinal stenosis of lumbar region with radiculopathy ICD-10-CM: M48.061, M54.16  ICD-9-CM: 724.02, 724.4  10/6/2015        Diabetic peripheral neuropathy associated with type 2 diabetes mellitus ([facility] ICD-10-CM: E11.42  ICD-9-CM: 250.60, 357.2  10/6/2015        History of cerebral infarction ICD-10-CM: Z86.73  ICD-9-CM: V12.54  10/6/2015        B12 deficiency ICD-10-CM: E53.8  ICD-9-CM: 266.2  7/13/2015        Cerebrovascular disease, unspecified ICD-10-CM: I67.9  ICD-9-CM: 437.9  7/13/2015        Post-concussion headache ICD-10-CM: X16.170  ICD-9-CM: 339.20  7/13/2015        Diabetes mellitus ([facility] ICD-10-CM: E11.9  ICD-9-CM: 250.00  7/10/2013        Uncontrolled type II diabetes mellitus ([facility] ICD-10-CM: E11.65  ICD-9-CM: 250.02  2/14/2013        Carotid stenosis, left ICD-10-CM: M65.38  ICD-9-CM: 433.10  2/13/2013        Nocturia ICD-10-CM: R35.1  ICD-9-CM: 788.43  2/13/2013        Hearing loss ICD-10-CM: H91.90  ICD-9-CM: 389.9  2/13/2013        Vitamin D deficiency ICD-10-CM: E55.9  ICD-9-CM: 268.9  2/13/2013        Sleep apnea ICD-10-CM: G47.30  ICD-9-CM: 780.57  2/13/2013        Edema of both legs ICD-10-CM: R60.0  ICD-9-CM: 782.3  2/13/2013        Memory disturbance ICD-10-CM: R41.3  ICD-9-CM: 780.93  2/13/2013        Gait disturbance ICD-10-CM: R26.9  ICD-9-CM: 781.2  2/13/2013        CAD (coronary artery disease) ICD-10-CM: I25.10  ICD-9-CM: 414.00  Unknown    Overview Addendum 11/30/2011  4:51 PM by Milena Hunt MD     Medtronic stent in Lcx 12/2008 in [place], [place]    Dobutamine cardiolite 11/2011 normal perfusion, EF 53%               Pure hypercholesterolemia ICD-10-CM: E78.00  ICD-9-CM: 272.0  12/7/2009        Hypertension ICD-10-CM: I10  ICD-9-CM: 401.9  6/16/2009        Arthritis ICD-10-CM: M19.90  ICD-9-CM: 716.90  6/16/2009        Anemia ICD-10-CM: D64.9  ICD-9-CM: 285.9  6/16/2009              Current Outpatient Medications   Medication Sig Dispense Refill    lisinopril (PRINIVIL, ZESTRIL) 2.5 mg tablet Take 1 Tab by mouth daily. 30 Tab 2    CHOLECALCIFEROL, VITAMIN D3, PO Take  by mouth every morning.  donepezil (ARICEPT) 10 mg tablet Take 10 mg by mouth nightly.  metFORMIN (GLUMETZA ER) 500 mg TG24 24 hour tablet Take 1,000 mg by mouth every evening.  pravastatin (PRAVACHOL) 40 mg tablet Take 40 mg by mouth nightly.  b complex vitamins tablet Take 1 Tab by mouth every other day.  cetirizine (ZYRTEC) 10 mg tablet Take 10 mg by mouth every morning.        No Known Allergies  Past Medical History:   Diagnosis Date    Advance directive discussed with patient 05/13/2015,05/25/2016    Anemia 6/16/2009    Anemia NEC     Arthritis 6/16/2009    BPH (benign prostatic hyperplasia)     CAD (coronary artery disease)     CAD (coronary artery disease) of artery bypass graft 6/16/2009    dr faye Vincent or associate Dr Dante Waters Carotid stenosis     Chronic pain     back    Concussion syndrome     DM (diabetes mellitus) ([facility]     Edema     Gait disturbance     Glaucoma suspect 12/04/14    [facility]I notes Jovita Lora    Headache due to trauma 1/2012    hit by a device on a door    Headache(784.0)     Hearing difficulty     Hypercholesterolemia     Hypertension 6/16/2009    Kidney stone 7/10    [place] styles [facility]    Microalbuminuria 2/2013    Mild memory disturbance Clemencia De La Cruz also    neuro associates Alisia Mckenna     Nasal septal deviation     Pain in back 6/16/2009    Screening for diabetic retinopathy 7/18/16    exam neg for retinopathy Stiven'/Faulkner    Sleep apnea     Vitamin D deficiency      Past Surgical History:   Procedure Laterality Date    CARDIAC SURG PROCEDURE UNLIST      stent 2008    ENDOSCOPY, COLON, DIAGNOSTIC      in [place] pt thinks in 2006    HX BACK SURGERY      HX CORONARY STENT PLACEMENT      HX HEART CATHETERIZATION      HX HEENT      left cataract    HX ORTHOPAEDIC      back multiple times    HX OTHER SURGICAL  2012    temporal  artery bx    HX OTHER SURGICAL  14    lower ext doppler    HX OTHER SURGICAL  16    echo report EF 60%    HX PTCA       Family History   Problem Relation Age of Onset    Heart Disease Mother     Heart Disease Father     Cancer Sister     Dementia Sister     Cancer Brother         leukemia    Arthritis-osteo Brother     Migraines Brother     Migraines Child     Arthritis-osteo Child      Social History     Tobacco Use    Smoking status: Former Smoker     Packs/day: 0.50     Years: 30.00     Pack years: 15.00     Types: Cigarettes     Last attempt to quit: 2014     Years since quittin.1    Smokeless tobacco: Never Used   Substance Use Topics    Alcohol use: No     Comment: occassionally        Review of Systems:   Constitutional: Negative for fever, chills, weight loss, malaise/fatigue. HEENT: Negative for nosebleeds, vision changes. Respiratory: Negative for cough, hemoptysis  Cardiovascular: Negative for chest pain, palpitations, orthopnea, claudication, + leg swelling, no syncope, and PND. Gastrointestinal: Negative for nausea, vomiting, diarrhea, blood in stool and melena. Genitourinary: Negative for dysuria, and hematuria. Musculoskeletal: Negative for myalgias, + back arthralgia. Skin: Negative for rash. Heme: Does not bleed or bruise easily. Neurological: Negative for speech change and focal weakness. Psychological: Dementia, short term memory deficit. Objective:     Visit Vitals  /72 (BP 1 Location: Left arm, BP Patient Position: Sitting)   Pulse 68   Resp 14   Ht 6' (1.829 m)   Wt 228 lb (103.4 kg)   SpO2 98%   BMI 30.92 kg/m²      Physical Exam:   Constitutional: well-developed and well-nourished. No respiratory distress. Head: Normocephalic and atraumatic.    Eyes: Pupils are equal, round  ENT: Hearing grossly normal  Neck: Supple. No JVD present. Cardiovascular: Normal rate, regular rhythm. Exam reveals no gallop and no friction rub. No murmur heard. Pulmonary/Chest: Effort normal and breath sounds normal. No wheezes. Abdominal: Soft, no tenderness. Obese. Musculoskeletal: 1+ bilateral lower extremity edema. Neurological: Alert,oriented. Skin: Skin is warm and dry  Psychiatric: Pleasant. Some short term memory deficit. Assessment/Plan:       ICD-10-CM ICD-9-CM    1. Near syncope R55 780.2    2. Coronary artery disease involving native coronary artery of native heart without angina pectoris I25.10 414.01    3. Essential hypertension I10 401.9    4. Severe obesity ([facility] E66.01 278.01    5. Chronic kidney disease, unspecified CKD stage N18.9 585.9      Presyncope, recent hospitalization likely due to orthostatic cause. He will work on increasing hydration. He has renal failure but is at baseline and stage 3  Daughter wants Dr Kamryn Perales group but on N side it would be Dr Tonja Grant  BP stable today, will continue to monitor at home. No angina, troponin 0.05 in ER. This is low and he has no angina  With CKD I do not recommend cardiac cath  Stress test in the past was without ischemia  He has carotid disease with 70% left and 60% right sided  Will repeat next year for follow up  He will seek referral from PCP for nephrology. BP well controlled today, 110/72. Continue lisinopril low dose. Follow up as previously scheduled.       Future Appointments   Date Time Provider Department Center   9/6/2019 To Be Determined Medialive   9/10/2019 To Be Determined Noemi Bhatti  [facility]   9/11/2019 10:30 AM Jonah Perez MD [facility] [place] SCHED   9/13/2019 To Be Determined [employer] [place] [facility]   9/17/2019 [facility]   9/20/2019 To Be Determined Jessy Crook [place] [facility] 2/25/2020  9:30 AM Kate Perez MD [facility] [place] SCHED   3/19/2020  2:30 PM [email or web address], [address]   3/19/2020  2:40 PM Alex Echols  [address]       Thank you for involving me in this patient's care and please call with further concerns or questions. Carmen Sanders M.D.   Electrophysiology/Cardiology  [facility] [place]  [address], [place] [address], [facility] [address]  [facility], [address]                             [address] [place] [address]  [phone]

## 2019-09-06 ENCOUNTER — HOME CARE VISIT (OUTPATIENT)
Dept: SCHEDULING | Facility: HOME HEALTH | Age: 84
End: 2019-09-06
Payer: MEDICARE

## 2019-09-06 VITALS
RESPIRATION RATE: 18 BRPM | TEMPERATURE: 98.3 F | SYSTOLIC BLOOD PRESSURE: 134 MMHG | HEART RATE: 75 BPM | OXYGEN SATURATION: 95 % | DIASTOLIC BLOOD PRESSURE: 70 MMHG

## 2019-09-06 PROCEDURE — G0299 HHS/HOSPICE OF RN EA 15 MIN: HCPCS

## 2019-09-06 PROCEDURE — 3331090002 HH PPS REVENUE DEBIT

## 2019-09-06 PROCEDURE — 3331090001 HH PPS REVENUE CREDIT

## 2019-09-07 PROCEDURE — 3331090002 HH PPS REVENUE DEBIT

## 2019-09-07 PROCEDURE — 3331090001 HH PPS REVENUE CREDIT

## 2019-09-08 PROCEDURE — 3331090002 HH PPS REVENUE DEBIT

## 2019-09-08 PROCEDURE — 3331090001 HH PPS REVENUE CREDIT

## 2019-09-09 ENCOUNTER — HOSPITAL ENCOUNTER (EMERGENCY)
Age: 84
Discharge: HOME | End: 2019-09-09
Attending: EMERGENCY MEDICINE | Admitting: EMERGENCY MEDICINE
Payer: MEDICARE

## 2019-09-09 ENCOUNTER — TELEPHONE (OUTPATIENT)
Dept: FAMILY MEDICINE CLINIC | Age: 84
End: 2019-09-09

## 2019-09-09 VITALS
WEIGHT: 226 LBS | OXYGEN SATURATION: 96 % | RESPIRATION RATE: 21 BRPM | HEART RATE: 76 BPM | TEMPERATURE: 97.6 F | DIASTOLIC BLOOD PRESSURE: 56 MMHG | SYSTOLIC BLOOD PRESSURE: 134 MMHG | BODY MASS INDEX: 30.61 KG/M2 | HEIGHT: 72 IN

## 2019-09-09 DIAGNOSIS — N17.9 AKI (ACUTE KIDNEY INJURY) (HCC): Primary | ICD-10-CM

## 2019-09-09 DIAGNOSIS — E86.0 DEHYDRATION: ICD-10-CM

## 2019-09-09 DIAGNOSIS — R53.1 GENERALIZED WEAKNESS: ICD-10-CM

## 2019-09-09 LAB
ALBUMIN SERPL-MCNC: 4.1 G/DL (ref 3.5–5)
ALBUMIN/GLOB SERPL: 1.1 {RATIO} (ref 1.1–2.2)
ALP SERPL-CCNC: 90 U/L (ref 45–117)
ALT SERPL-CCNC: 17 U/L (ref 12–78)
ANION GAP SERPL CALC-SCNC: 9 MMOL/L (ref 5–15)
AST SERPL-CCNC: 10 U/L (ref 15–37)
ATRIAL RATE: 74 BPM
BASOPHILS # BLD: 0 K/UL (ref 0–0.1)
BASOPHILS NFR BLD: 0 % (ref 0–1)
BILIRUB SERPL-MCNC: 0.4 MG/DL (ref 0.2–1)
BUN SERPL-MCNC: 30 MG/DL (ref 6–20)
BUN/CREAT SERPL: 16 (ref 12–20)
CALCIUM SERPL-MCNC: 8.8 MG/DL (ref 8.5–10.1)
CALCULATED P AXIS, ECG09: 42 DEGREES
CALCULATED R AXIS, ECG10: 39 DEGREES
CALCULATED T AXIS, ECG11: 31 DEGREES
CHLORIDE SERPL-SCNC: 106 MMOL/L (ref 97–108)
CO2 SERPL-SCNC: 25 MMOL/L (ref 21–32)
COMMENT, HOLDF: NORMAL
CREAT SERPL-MCNC: 1.92 MG/DL (ref 0.7–1.3)
DIAGNOSIS, 93000: NORMAL
DIFFERENTIAL METHOD BLD: ABNORMAL
EOSINOPHIL # BLD: 0.1 K/UL (ref 0–0.4)
EOSINOPHIL NFR BLD: 1 % (ref 0–7)
ERYTHROCYTE [DISTWIDTH] IN BLOOD BY AUTOMATED COUNT: 12.9 % (ref 11.5–14.5)
GLOBULIN SER CALC-MCNC: 3.9 G/DL (ref 2–4)
GLUCOSE SERPL-MCNC: 177 MG/DL (ref 65–100)
HCT VFR BLD AUTO: 41.9 % (ref 36.6–50.3)
HGB BLD-MCNC: 12.2 G/DL (ref 12.1–17)
IMM GRANULOCYTES # BLD AUTO: 0 K/UL (ref 0–0.04)
IMM GRANULOCYTES NFR BLD AUTO: 1 % (ref 0–0.5)
LYMPHOCYTES # BLD: 1.4 K/UL (ref 0.8–3.5)
LYMPHOCYTES NFR BLD: 17 % (ref 12–49)
MAGNESIUM SERPL-MCNC: 2.3 MG/DL (ref 1.6–2.4)
MCH RBC QN AUTO: 28.1 PG (ref 26–34)
MCHC RBC AUTO-ENTMCNC: 29.1 G/DL (ref 30–36.5)
MCV RBC AUTO: 96.5 FL (ref 80–99)
MONOCYTES # BLD: 0.5 K/UL (ref 0–1)
MONOCYTES NFR BLD: 7 % (ref 5–13)
NEUTS SEG # BLD: 5.9 K/UL (ref 1.8–8)
NEUTS SEG NFR BLD: 74 % (ref 32–75)
NRBC # BLD: 0 K/UL (ref 0–0.01)
NRBC BLD-RTO: 0 PER 100 WBC
P-R INTERVAL, ECG05: 128 MS
PLATELET # BLD AUTO: 273 K/UL (ref 150–400)
PMV BLD AUTO: 10 FL (ref 8.9–12.9)
POTASSIUM SERPL-SCNC: 4.6 MMOL/L (ref 3.5–5.1)
PROT SERPL-MCNC: 8 G/DL (ref 6.4–8.2)
Q-T INTERVAL, ECG07: 388 MS
QRS DURATION, ECG06: 112 MS
QTC CALCULATION (BEZET), ECG08: 430 MS
RBC # BLD AUTO: 4.34 M/UL (ref 4.1–5.7)
SAMPLES BEING HELD,HOLD: NORMAL
SODIUM SERPL-SCNC: 140 MMOL/L (ref 136–145)
VENTRICULAR RATE, ECG03: 74 BPM
WBC # BLD AUTO: 7.9 K/UL (ref 4.1–11.1)

## 2019-09-09 PROCEDURE — 83735 ASSAY OF MAGNESIUM: CPT

## 2019-09-09 PROCEDURE — 93005 ELECTROCARDIOGRAM TRACING: CPT

## 2019-09-09 PROCEDURE — 36415 COLL VENOUS BLD VENIPUNCTURE: CPT

## 2019-09-09 PROCEDURE — 80053 COMPREHEN METABOLIC PANEL: CPT

## 2019-09-09 PROCEDURE — 3331090002 HH PPS REVENUE DEBIT

## 2019-09-09 PROCEDURE — 74011250636 HC RX REV CODE- 250/636: Performed by: EMERGENCY MEDICINE

## 2019-09-09 PROCEDURE — 97161 PT EVAL LOW COMPLEX 20 MIN: CPT

## 2019-09-09 PROCEDURE — 99285 EMERGENCY DEPT VISIT HI MDM: CPT

## 2019-09-09 PROCEDURE — 3331090001 HH PPS REVENUE CREDIT

## 2019-09-09 PROCEDURE — 96360 HYDRATION IV INFUSION INIT: CPT

## 2019-09-09 PROCEDURE — 85025 COMPLETE CBC W/AUTO DIFF WBC: CPT

## 2019-09-09 RX ADMIN — SODIUM CHLORIDE 1000 ML: 900 INJECTION, SOLUTION INTRAVENOUS at 14:42

## 2019-09-09 NOTE — PROGRESS NOTES
Admission Medication Reconciliation:    Information obtained from:  patient's wife, rx query  RxQuery data available¹:  YES    Comments/Recommendations: Updated PTA meds/reviewed patient's allergies. 1)  Patient's wife manages the patient's medications    2)  Medication changes (since last review): Added  - none    Adjusted  - none    Removed  - none       ¹RxQuery pharmacy benefit data reflects medications filled and processed through the patient's insurance, however   this data does NOT capture whether the medication was picked up or is currently being taken by the patient. Allergies:  Patient has no known allergies. Significant PMH/Disease States:   Past Medical History:   Diagnosis Date    Advance directive discussed with patient 05/13/2015,05/25/2016    Anemia 6/16/2009    Anemia NEC     Arthritis 6/16/2009    BPH (benign prostatic hyperplasia)     CAD (coronary artery disease)     CAD (coronary artery disease) of artery bypass graft 6/16/2009    dr faye Smith or associate Dr Beebe Comp    Carotid stenosis     Chronic pain     back    Concussion syndrome     DM (diabetes mellitus) ([facility]     Edema     Gait disturbance     Glaucoma suspect 12/04/14    [facility]I notes Johny Lucho    Headache due to trauma 1/2012    hit by a device on a door    Headache(784.0)     Hearing difficulty     Hypercholesterolemia     Hypertension 6/16/2009    Kidney stone 7/10    dr Monisha Nunez lithotripsy    Microalbuminuria 2/2013    Mild memory disturbance Riki Hickey also    neuro associates Alisia Andres     Nasal septal deviation     Pain in back 6/16/2009    Screening for diabetic retinopathy 7/18/16    exam neg for retinopathy Stiven'/Faulkner    Sleep apnea     Vitamin D deficiency      Chief Complaint for this Admission:    Chief Complaint   Patient presents with    Extremity Weakness     Prior to Admission Medications:   Prior to Admission Medications   Prescriptions Last Dose Informant Patient Reported?  Taking?   b complex vitamins tablet 9/9/2019 at Unknown time  Yes Yes   Sig: Take 1 Tab by mouth every other day. cetirizine (ZYRTEC) 10 mg tablet 9/9/2019 at Unknown time  Yes Yes   Sig: Take 10 mg by mouth every morning. cholecalciferol (VITAMIN D3) 1,000 unit tablet 9/9/2019 at Unknown time  Yes Yes   Sig: Take 1,000 Units by mouth every morning. donepezil (ARICEPT) 10 mg tablet 9/8/2019 at Unknown time  Yes Yes   Sig: Take 10 mg by mouth nightly. lisinopril (PRINIVIL, ZESTRIL) 2.5 mg tablet 9/8/2019 at Unknown time  No Yes   Sig: Take 1 Tab by mouth daily. metFORMIN (GLUMETZA ER) 500 mg TG24 24 hour tablet 9/8/2019 at Unknown time  Yes Yes   Sig: Take 1,000 mg by mouth every evening. pravastatin (PRAVACHOL) 40 mg tablet 9/8/2019 at Unknown time  Yes Yes   Sig: Take 40 mg by mouth nightly. Facility-Administered Medications: None       Please contact the main inpatient pharmacy with any questions or concerns at [phone] and we will direct you to the clinical pharmacist covering this patient's care while in-house.    Moises Mejia

## 2019-09-09 NOTE — PROGRESS NOTES
PHYSICAL THERAPY EMERGENCY DEPARTMENT EVALUATION WITH DISCHARGE  Patient: Alivia May (38 y.o. male)  Date: 9/9/2019  Primary Diagnosis: No admission diagnoses are documented for this encounter. Precautions: fall       ASSESSMENT  Based on the objective data described below and per family report, the patient presents functioning at his recent baseline. Daughter and wife note improvement in patient's mentation and mobility since IVF in the ED. Patient is however a fall risk and with decreased endurance. This is not new since hosp discharge last week. From a physical therapy perspective, patient is clear to discharge home with family and continued home health ns with the addition of physical therapy. Wife and daughter voiced no issues taking patient home and are in agreement. Functional Outcome Measure: The patient scored 19/28 on the Tinetti outcome measure which is indicative of moderate fall risk. Other factors to consider for discharge: Lives with wife, ramp to enter home, open to [facility] for [facility] with the plan to add physical therapy     Further skilled acute physical therapy is not indicated at this time. PLAN :  Recommendation for discharge: (in order for the patient to meet his/her long term goals)  Physical therapy at least 2 days/week in the home AND ensure assist and/or supervision for safety with mobility. Per care management, patient is open to [facility] and that they are adding PT    This discharge recommendation:  Has been made in collaboration with the attending provider and/or case management    Equipment recommendations for successful discharge: none     SUBJECTIVE:   Patient stated Cammie Aguiar.     OBJECTIVE DATA SUMMARY:   HISTORY:    Past Medical History:   Diagnosis Date    Advance directive discussed with patient 05/13/2015,05/25/2016    Anemia 6/16/2009    Anemia NEC     Arthritis 6/16/2009    BPH (benign prostatic hyperplasia)     CAD (coronary artery disease)     CAD (coronary artery disease) of artery bypass graft 6/16/2009    dr faye Lopez or associate Dr Chirag James Carotid stenosis     Chronic pain     back    Concussion syndrome     DM (diabetes mellitus) ([facility]     Edema     Gait disturbance     Glaucoma suspect 12/04/14    VEI notes Maritza Rae    Headache due to trauma 1/2012    hit by a device on a door    Headache(784.0)     Hearing difficulty     Hypercholesterolemia     Hypertension 6/16/2009    Kidney stone 7[place]    [facility]    Microalbuminuria 2/2013    Mild memory disturbance Soila Stylesi also    neuro associates Alisia Mckenna     Nasal septal deviation     Pain in back 6/16/2009    Screening for diabetic retinopathy 7/18/16    exam neg for retinopathy Stiven'/Faulkner    Sleep apnea     Vitamin D deficiency      Past Surgical History:   Procedure Laterality Date    CARDIAC SURG PROCEDURE UNLIST      stent 2008    ENDOSCOPY, COLON, DIAGNOSTIC      in [facility]    HX BACK SURGERY      HX CORONARY STENT PLACEMENT      HX HEART CATHETERIZATION      HX HEENT      left cataract    HX ORTHOPAEDIC      back multiple times    HX OTHER SURGICAL  12/2012    temporal  artery bx    HX OTHER SURGICAL  4/23/14    lower ext doppler    HX OTHER SURGICAL  4/1/16    echo report EF 60%    HX PTCA         Home Situation  Home Environment: Private residence  Wheelchair Ramp: Yes  One/Two Story Residence: One story  Living Alone: No  Support Systems: Home care staff, Child(erasmo), Spouse/Significant Other/Partner  Patient Expects to be Discharged to[de-identified] Private residence  Current DME Used/Available at Home: [employer], quad, Jorge Older, rollator    EXAMINATION/PRESENTATION/DECISION MAKING:   Critical Behavior:  Neurologic State: Alert  Orientation Level: Oriented to place, Oriented to person  Cognition: Follows commands, Memory loss     Hearing:   Auditory  Auditory Impairment: None    Range Of Motion:  AROM: Generally decreased, functional                       Strength:    Strength: Generally decreased, functional     Functional Mobility:  Bed Mobility:     Supine to Sit: Supervision  Sit to Supine: Supervision  Scooting: Supervision  Transfers:  Sit to Stand: Supervision/ SBA  Stand to Sit: Supervision/ SBA        Balance:   Sitting: Intact; Without support  Standing: Impaired; Without support  Standing - Static: Fair  Standing - Dynamic : Poor  Ambulation/Gait Training:  Distance (ft): 75 Feet (ft)  Assistive Device: Cane, quad;Gait belt        Gait Description (WDL): Exceptions to WDL  Gait Abnormalities: Decreased step clearance(forward flexed trunk; cane held close to feet)              Speed/Mary: Slow  Step Length: Left shortened;Right shortened        Interventions: Verbal cues    Special Tests:  Tinetti test:    Sitting Balance: 1  Arises: 1  Attempts to Rise: 2  Immediate Standing Balance: 1  Standing Balance: 1  Nudged: 2  Eyes Closed: 0  Turn 360 Degrees - Continuous/Discontinuous: 1  Turn 360 Degrees - Steady/Unsteady: 1(with cane)  Sitting Down: 1  Balance Score: 11 Balance total score  Indication of Gait: 1  R Step Length/Height: 1  L Step Length/Height: 1  R Foot Clearance: 1  L Foot Clearance: 1  Step Symmetry: 1  Step Continuity: 1  Path: 1  Trunk: 0  Walking Time: 0  Gait Score: 8 Gait total score  Total Score: 19/28 Overall total score         Tinetti Tool Score Risk of Falls  <19 = High Fall Risk  19-24 = Moderate Fall Risk  25-28 = Low Fall Risk  Tinetti ME. Performance-Oriented Assessment of Mobility Problems in Elderly Patients. Anthony 66; [id number].  (Scoring Description: PT Bulletin Feb. 10, 1993)    Older adults: Elena Stoddard et al, 2009; n = 1000 [facility] elderly evaluated with ABC, SUSU, ADL, and IADL)  · Mean SUSU score for males aged 69-68 years = 26.21(3.40)  · Mean SUSU score for females age 69-68 years = 25.16(4.30)  · Mean SUSU score for males over 80 years = 23.29(6.02)  · Mean SUSU score for females over [de-identified] years = 17.20(8.32)          Physical Therapy Evaluation Charge Determination   History Examination Presentation Decision-Making   LOW Complexity : Zero comorbidities / personal factors that will impact the outcome / POC LOW Complexity : 1-2 Standardized tests and measures addressing body structure, function, activity limitation and / or participation in recreation  LOW Complexity : Stable, uncomplicated  LOW Complexity : FOTO score of       Based on the above components, the patient evaluation is determined to be of the following complexity level: LOW       Pain:   Patient voiced no pain    Activity Tolerance:   Fair - fatigued and SOB with distance walked. Please refer to the flowsheet for vital signs taken during this treatment. BP supine to stand negative for orthostatic hypotension. After treatment patient left in no apparent distress: Supine in bed, caregiver at bedside    COMMUNICATION/EDUCATION:   Communication/Collaboration:  Fall prevention education was provided and the patient/caregiver indicated understanding., Patient/family have participated as able in goal setting and plan of care. and Patient/family agree to work toward stated goals and plan of care.     Findings and recommendations were discussed with: Physician and Care management    Thank you for this referral.  Eliana Lujan, PT   Time Calculation: 24 mins

## 2019-09-09 NOTE — ED PROVIDER NOTES
80 y.o. male with past medical history significant for dementia, glaucoma suspect, CAD, HTN, DM, and arthritis who presents from home with chief complaint of weakness. Patients daughter and spouse report earlier today patient c/o of weakness and was unable to get up from his chair. Patient's daughter states she took patient's BP (75/50) and BG (222) and called patients PCP. Patient was unable to be seen by PCP today and was referred to ED for further evaluation. Patient presents to [facility] ED c/o weakness. Patient's daughter notes patient was evaluated recently by Dr. Karen Valencia who stated he \"was doing well\" from cardiology stand point. Patient uses a cane to ambulate at baseline and was able to walk since the incident per his family. Patient relatives deny patient having a hx of heart failure. Patient denies N/V/D, dysarthria, headache, abdominal pain, SOB, chest pain, lightheadedness, and dizziness. There are no other acute medical concerns at this time. Social hx: (+) Tobacco use (Former), No EtOH use, No illicit drug use. PCP: Capo Manley MD    Chart Review: Pt was recently admitted to [facility] (08/30/19) for presyncope - per medical records probable presyncope due to vasovagal event versus orthostatis (No acute ischemia or dysrhythmias). Note written by Steven Pineda, as dictated by Shadi Connolly MD 2:14 PM     The history is provided by a relative, medical records and the patient. No  was used.         Past Medical History:   Diagnosis Date    Advance directive discussed with patient 05/13/2015,05/25/2016    Anemia 6/16/2009    Anemia NEC     Arthritis 6/16/2009    BPH (benign prostatic hyperplasia)     CAD (coronary artery disease)     CAD (coronary artery disease) of artery bypass graft 6/16/2009    dr faye Taylor or associate Dr Behzad Pereira Carotid stenosis     Chronic pain     back    Concussion syndrome     DM (diabetes mellitus) ([facility] [address]     Edema     Gait disturbance  Glaucoma suspect 12/04/14    [facility] notes Maritza Rae    Headache due to trauma 1/2012    hit by a device on a door    Headache(784.0)     Hearing difficulty     Hypercholesterolemia     Hypertension 6/16/2009    Kidney stone 7/10    dr Anthony [facility]ri[facility]    Microalbuminuria 2/2013    Mild memory disturbance Soila Stylesi also    neuro associates Alisia Mckenna     Nasal septal deviation     Pain in back 6/16/2009    Screening for diabetic retinopathy 7/18/16    exam neg for retinopathy [facility]Fa[facility]ner    Sleep apnea     Vitamin D deficiency        Past Surgical History:   Procedure Laterality Date    CARDIAC SURG PROCEDURE UNLIST      stent 2008    ENDOSCOPY, COLON, DIAGNOSTIC      in [facility] thinks in 2006    HX BACK SURGERY      HX CORONARY STENT PLACEMENT      HX HEART CATHETERIZATION      HX HEENT      left cataract    HX ORTHOPAEDIC      back multiple times    HX OTHER SURGICAL  12/2012    temporal  artery bx    HX OTHER SURGICAL  4/23/14    lower ext doppler    HX OTHER SURGICAL  4/1/16    echo report EF 60%    HX PTCA           Family History:   Problem Relation Age of Onset    Heart Disease Mother     Heart Disease Father     Cancer Sister     Dementia Sister     Cancer Brother         leukemia    Arthritis-osteo Brother     Migraines Brother     Migraines Child     Arthritis-osteo Child        Social History     Socioeconomic History    Marital status:      Spouse name: Not on file    Number of children: Not on file    Years of education: Not on file    Highest education level: Not on file   Occupational History    Not on file   Social Needs    Financial resource strain: Not on file    Food insecurity:     Worry: Not on file     Inability: Not on file    Transportation needs:     Medical: Not on file     Non-medical: Not on file   Tobacco Use    Smoking status: Former Smoker     Packs/day: 0.50     Years: 30.00     Pack years: 15.00     Types: Cigarettes     Last attempt to quit: 2014     Years since quittin.1    Smokeless tobacco: Never Used   Substance and Sexual Activity    Alcohol use: No     Comment: occassionally    Drug use: No    Sexual activity: Yes     Partners: Female     Birth control/protection: None   Lifestyle    Physical activity:     Days per week: Not on file     Minutes per session: Not on file    Stress: Not on file   Relationships    Social connections:     Talks on phone: Not on file     Gets together: Not on file     Attends Baptism service: Not on file     Active member of club or organization: Not on file     Attends meetings of clubs or organizations: Not on file     Relationship status: Not on file    Intimate partner violence:     Fear of current or ex partner: Not on file     Emotionally abused: Not on file     Physically abused: Not on file     Forced sexual activity: Not on file   Other Topics Concern    Not on file   Social History Narrative    Not on file         ALLERGIES: Patient has no known allergies. Review of Systems   Unable to perform ROS: Dementia       Vitals:    19 1328 19 1334   BP:  128/57   Pulse: 76 76   Resp:  20   Temp:  97.6 °F (36.4 °C)   SpO2: 99% 97%   Weight:  102.5 kg (226 lb)   Height:  6' (1.829 m)            Physical Exam   Constitutional: He is oriented to person, place, and time. He appears well-developed and well-nourished. HENT:   Head: Normocephalic. Nose: Nose normal.   Mouth/Throat: Mucous membranes are dry. Eyes: Conjunctivae are normal.   Per pt's daughter left pin point pupil and right oval shaped/non reactive pupil are chronic. Neck: Normal range of motion. Neck supple. Cardiovascular: Regular rhythm, normal heart sounds and intact distal pulses. No murmur heard. Pulmonary/Chest: Effort normal. No respiratory distress. Decreased lung sounds in bases, poor effort. Abdominal: Soft.  Bowel sounds are normal. He exhibits no distension and no mass. There is no tenderness. There is no rebound and no guarding. Musculoskeletal: Normal range of motion. He exhibits no tenderness or deformity. Trace edema. Neurological: He is oriented to person, place, and time. He has normal reflexes. Skin: Skin is warm and dry. Psychiatric: He has a normal mood and affect. His behavior is normal.   Nursing note and vitals reviewed. Note written by Steven Galdamez, as dictated by Katy Quintero MD 2:14 PM      MDM       Procedures  ED EKG interpretation:  Rhythm: sinus rhythm; and regular . Rate (approx.): 74 bpm; Axis: normal; ST/T wave: No ST elevations or depressions, normal T waves; normal intervals. Note written by Steven Galdamez, as dictated by Katy Quintero MD 1:35 PM     CONSULT NOTE:  3:36 PM Katy Quintero MD will Consult for Physical Therapy. Awaiting Physical therapy on-call to contact provider. 3:47 PM  Pt still doing well. BP has improved from 105/45 to 123/50s after 1 L of IVF. I have advised pt and his family of the DEREK noted on labs with other test within normal limits. Pt's EKG does not explain episode earlier today either. Family state they have difficulty getting the pt to eat/drink at times but are working on this. He had similar labs during admit in 8/30/19 with improvement after treatment. BUN/Cr are back elevated at this time. 4:22 PM  Pt has been seen by [facility] Management, evaluated, vital signs maintained and able to ambulate without difficulty with cane. Pt advised he should stay for IVF if he is unable to drink / eat well at home. He and family want to go home. will discharge. Return precautions given. 4:24 PM  Patient's results have been reviewed with them.   Patient and/or family have verbally conveyed their understanding and agreement of the patient's signs, symptoms, diagnosis, treatment and prognosis and additionally agree to follow up as recommended or return to the Emergency Room should their condition change prior to follow-up. Discharge instructions have also been provided to the patient with some educational information regarding their diagnosis as well a list of reasons why they would want to return to the ER prior to their follow-up appointment should their condition change.     Joya Ramirez MD

## 2019-09-09 NOTE — DISCHARGE INSTRUCTIONS
Patient Education        Dehydration: Care Instructions  Your Care Instructions  Dehydration happens when your body loses too much fluid. This might happen when you do not drink enough water or you lose large amounts of fluids from your body because of diarrhea, vomiting, or sweating. Severe dehydration can be life-threatening. Water and minerals called electrolytes help put your body fluids back in balance. Learn the early signs of fluid loss, and drink more fluids to prevent dehydration. Follow-up care is a key part of your treatment and safety. Be sure to make and go to all appointments, and call your doctor if you are having problems. It's also a good idea to know your test results and keep a list of the medicines you take. How can you care for yourself at home? · To prevent dehydration, drink plenty of fluids, enough so that your urine is light yellow or clear like water. Choose water and other caffeine-free clear liquids until you feel better. If you have kidney, heart, or liver disease and have to limit fluids, talk with your doctor before you increase the amount of fluids you drink. · If you do not feel like eating or drinking, try taking small sips of water, sports drinks, or other rehydration drinks. · Get plenty of rest.  To prevent dehydration  · Add more fluids to your diet and daily routine, unless your doctor has told you not to. · During hot weather, drink more fluids. Drink even more fluids if you exercise a lot. Stay away from drinks with alcohol or caffeine. · Watch for the symptoms of dehydration. These include:  ? A dry, sticky mouth. ? Dark yellow urine, and not much of it. ? Dry and sunken eyes. ? Feeling very tired. · Learn what problems can lead to dehydration. These include:  ? Diarrhea, fever, and vomiting. ? Any illness with a fever, such as pneumonia or the flu. ?  Activities that cause heavy sweating, such as endurance races and heavy outdoor work in hot or humid weather. ? Alcohol or drug use or problems caused by quitting their use (withdrawal). ? Certain medicines, such as cold and allergy pills (antihistamines), diet pills (diuretics), and laxatives. ? Certain diseases, such as diabetes, cancer, and heart or kidney disease. When should you call for help? Call 911 anytime you think you may need emergency care. For example, call if:    · You passed out (lost consciousness).    Call your doctor now or seek immediate medical care if:    · You are confused and cannot think clearly.     · You are dizzy or lightheaded, or you feel like you may faint.     · You have signs of needing more fluids. You have sunken eyes and a dry mouth, and you pass only a little dark urine.     · You cannot keep fluids down.    Watch closely for changes in your health, and be sure to contact your doctor if:    · You are not making tears.     · Your skin is very dry and sags slowly back into place after you pinch it.     · Your mouth and eyes are very dry. Where can you learn more? Go to http://denise-chitra.info/. Enter A822 in the search box to learn more about \"Dehydration: Care Instructions. \"  Current as of: September 23, 2018  Content Version: 12.1  © 8261-2440 [employer]. Care instructions adapted under license by [employer] (which disclaims liability or warranty for this information). If you have questions about a medical condition or this instruction, always ask your healthcare professional. [place] [address] any warranty or liability for your use of this information. Patient Education        Acute Kidney Injury: Care Instructions  Your Care Instructions    Acute kidney injury (DEREK) is a sudden decrease in kidney function. This can happen over a period of hours, days or, in some cases, weeks. DEREK used to be called acute renal failure, but kidney failure doesn't always happen with DEREK.  Common causes of DEREK are dehydration, blood loss, and medicines. When DEREK happens, the kidneys have trouble removing waste and excess fluids from the body. The waste and fluids build up and become harmful. Kidney function may return to normal if the cause of DEREK is treated quickly. Your chance of a full recovery depends on what caused the problem, how quickly the cause was treated, and what other medical problems you have. A machine may be used to help your kidneys remove waste and fluids for a short period of time. This is called dialysis. Follow-up care is a key part of your treatment and safety. Be sure to make and go to all appointments, and call your doctor if you are having problems. It's also a good idea to know your test results and keep a list of the medicines you take. How can you care for yourself at home? · Talk to your doctor about how much fluid you should drink. · Eat a balanced diet. Talk to your doctor or a dietitian about what type of diet may be best for you. You may need to limit sodium, potassium, and phosphorus. · If you need dialysis, follow the instructions and schedule for dialysis that your doctor gives you. · Do not smoke. Smoking can make your condition worse. If you need help quitting, talk to your doctor about stop-smoking programs and medicines. These can increase your chances of quitting for good. · Do not drink alcohol. · Review all of your medicines with your doctor. Do not take any medicines, including nonsteroidal anti-inflammatory drugs (NSAIDs) such as ibuprofen (Advil, Motrin) or naproxen (Aleve), unless your doctor says it is safe for you to do so. · Make sure that anyone treating you for any health problem knows that you have had DEREK. When should you call for help? Call 911 anytime you think you may need emergency care. For example, call if:    · You passed out (lost consciousness).    Call your doctor now or seek immediate medical care if:    · You have new or worse nausea and vomiting.   · You have much less urine than normal, or you have no urine.     · You are feeling confused or cannot think clearly.     · You have new or more blood in your urine.     · You have new swelling.     · You are dizzy or lightheaded, or feel like you may faint.    Watch closely for changes in your health, and be sure to contact your doctor if:    · You do not get better as expected. Where can you learn more? Go to http://denise-chitra.info/. Enter R731 in the search box to learn more about \"Acute Kidney Injury: Care Instructions. \"  Current as of: October 31, 2018  Content Version: 12.1  © 4162-6672 [employer]. Care instructions adapted under license by [employer] (which disclaims liability or warranty for this information). If you have questions about a medical condition or this instruction, always ask your healthcare professional. [place] [address] any warranty or liability for your use of this information. Patient Education        Weakness: Care Instructions  Your Care Instructions    Weakness is a lack of physical or muscle strength. You may feel that you need to make extra effort to move your arms, legs, or other muscles. Generalized weakness means that you feel weak in most areas of your body. Another type of weakness may affect just one muscle or group of muscles. You may feel weak and tired after you have done too much activity, such as taking an extra-long hike. This is not a serious problem. It often goes away on its own. Feeling weak can also be caused by medical conditions like thyroid problems, depression, or a virus. Sometimes the cause can be serious. Your doctor may want to do more tests to try to find the cause of the weakness. The doctor has checked you carefully, but problems can develop later. If you notice any problems or new symptoms, get medical treatment right away.   Follow-up care is a key part of your treatment and safety. Be sure to make and go to all appointments, and call your doctor if you are having problems. It's also a good idea to know your test results and keep a list of the medicines you take. How can you care for yourself at home? · Rest when you feel tired. · Be safe with medicines. If your doctor prescribed medicine, take it exactly as prescribed. Call your doctor if you think you are having a problem with your medicine. You will get more details on the specific medicines your doctor prescribes. · Do not skip meals. Eating a balanced diet may increase your energy level. · Get some physical activity every day, but do not get too tired. When should you call for help? Call your doctor now or seek immediate medical care if:    · You have new or worse weakness.     · You are dizzy or lightheaded, or you feel like you may faint.    Watch closely for changes in your health, and be sure to contact your doctor if:    · You do not get better as expected. Where can you learn more? Go to http://denise-chitra.info/. Enter 583 [id number] [id number] in the search box to learn more about \"Weakness: Care Instructions. \"  Current as of: September 23, 2018  Content Version: 12.1  © 3209-2665 [employer]. Care instructions adapted under license by [employer] (which disclaims liability or warranty for this information). If you have questions about a medical condition or this instruction, always ask your healthcare professional. [place] [address] any warranty or liability for your use of this information.

## 2019-09-09 NOTE — PROGRESS NOTES
Date of previous inpatient admission/ ED visit? OBS 8/30/19-9/10/19 Presyncope  What brought the patient back to ED? Patient presents to the ED w/ chief compliant of weakness    Did patient decline recommended services during last admission/ ED visit (if yes, what)? No. Referral sent to [facility] agency is following     Has patient seen a provider since their last inpatient admission/ED visit (if yes, when)? Patient seen by Cardiologist Dr.Matthew Jhonathan Roman 9/5/19    CM Interventions:  From previous inpatient admission/ED visit:  Assessment  From current inpatient admission/ED visit: Assessment    TRST noted. EMR reviewed. This writer assessed patient on 8/30/19. CM met w/patient, spouse, and daughter Day Funez [phone] - introduced to role of CM. Daughter was out-of-town during last ED encounter. History provided by spouse and daughter. Patient was unable to get up from chair this morning.  utilizes furniture while ambulating through the house and cane/ when outside. Informed today was first day using rollator secondary to weakness. Arianna Gaytan acknowledged patient is drinking less and less at home. CM asked if length of time family unable to quantify months etc. Daughter informed CM of recommendation for patient to follow up w/ Kidney MD and Urologist. Arianna Lukas drives patient to medical appointments. AMD on file spouse and son Latanya (Brandon 89) are MPOA. Gaudencio Smith is available for support when needed. CM obtain information from Silvio Renteria regarding [employer]. This writer also provided information on [employer]/ [employer]/ and Private Duty. No transitional care needs verbalized. Daughter planned for spine surgery in 1-2 weeks. [employer] / and [employer] are insurance providers    PCP is Dr. Smitha Noriega next appointment 9/11/19    Care Management Interventions  PCP Verified by CM:  Yes  Last Visit to PCP: 06/25/19  Palliative Care Criteria Met (RRAT>21 & CHF Dx)?: No  Transition of Care Consult (CM Consult):  Other(TRST)  MyChart Signup: Yes  Discharge Durable Medical Equipment: No  Health Maintenance Reviewed: Yes  Physical Therapy Consult: Yes  Occupational Therapy Consult: No  Speech Therapy Consult: No  Current Support Network: Own Home, Lives with Spouse  Confirm Follow Up Transport: (TBD)  Plan discussed with Pt/Family/Caregiver: Yes  [place] Resource Information Provided?: No  Discharge Location  Discharge Placement: (TBD)

## 2019-09-09 NOTE — TELEPHONE ENCOUNTER
Verified two paint identifiers with daughter on Ana, name and . Patient c/o patient BP 75/50, blood sugar 222, and patient not really drinking fluids. Patient was recently admitted r/t low blood pressure, dehydration, and near syncope. Daughter advised patient.  Patient advised to go to ED for eval.& Tx.

## 2019-09-09 NOTE — ED TRIAGE NOTES
Pt brought in by wife who stated he was having another stroke, she stated he was sitting at a table and couldn't get up, pt with dementia but stated he vomited and has no other complaints, wife denies that he vomited, daughters stated he was weak and could not stand up, they took his blood pressure and it was 76/50 and 61/44, denies numbness or tingling, denies any visual difficulty, denies any speech difficulty, they also stated his blood sugar is up at 222

## 2019-09-10 ENCOUNTER — HOME CARE VISIT (OUTPATIENT)
Dept: SCHEDULING | Facility: HOME HEALTH | Age: 84
End: 2019-09-10
Payer: MEDICARE

## 2019-09-10 PROCEDURE — 3331090002 HH PPS REVENUE DEBIT

## 2019-09-10 PROCEDURE — G0300 HHS/HOSPICE OF LPN EA 15 MIN: HCPCS

## 2019-09-10 PROCEDURE — 3331090001 HH PPS REVENUE CREDIT

## 2019-09-11 ENCOUNTER — OFFICE VISIT (OUTPATIENT)
Dept: FAMILY MEDICINE CLINIC | Age: 84
End: 2019-09-11

## 2019-09-11 VITALS
SYSTOLIC BLOOD PRESSURE: 137 MMHG | WEIGHT: 230.8 LBS | OXYGEN SATURATION: 96 % | TEMPERATURE: 98 F | DIASTOLIC BLOOD PRESSURE: 62 MMHG | RESPIRATION RATE: 20 BRPM | HEART RATE: 65 BPM | BODY MASS INDEX: 31.26 KG/M2 | HEIGHT: 72 IN

## 2019-09-11 VITALS
SYSTOLIC BLOOD PRESSURE: 110 MMHG | HEART RATE: 71 BPM | DIASTOLIC BLOOD PRESSURE: 62 MMHG | TEMPERATURE: 98.3 F | OXYGEN SATURATION: 98 %

## 2019-09-11 DIAGNOSIS — Z23 ENCOUNTER FOR IMMUNIZATION: ICD-10-CM

## 2019-09-11 DIAGNOSIS — R55 PRE-SYNCOPE: ICD-10-CM

## 2019-09-11 DIAGNOSIS — N13.9 URINARY (TRACT) OBSTRUCTION: Primary | ICD-10-CM

## 2019-09-11 DIAGNOSIS — E86.0 DEHYDRATION: ICD-10-CM

## 2019-09-11 DIAGNOSIS — N17.9 AKI (ACUTE KIDNEY INJURY) (HCC): ICD-10-CM

## 2019-09-11 PROCEDURE — 3331090001 HH PPS REVENUE CREDIT

## 2019-09-11 PROCEDURE — 3331090002 HH PPS REVENUE DEBIT

## 2019-09-11 NOTE — PROGRESS NOTES
Immunization/s administered on 9/11/2019 by Jaye Clifton per Salena Cox MD with patients consent signed. Patient tolerated procedure well. No reactions noted.

## 2019-09-11 NOTE — PROGRESS NOTES
Toño Whitfield  Identified pt with two pt identifiers(name and ). Chief Complaint   Patient presents with   [facility] ED Follow-up    Referral Request     Kidney and Urology       1. Have you been to the ER, urgent care clinic since your last visit? Hospitalized since your last visit? Yes    2. Have you seen or consulted any other health care providers outside of the [address] [place] [address] since your last visit? Include any pap smears or colon screening. no      Would you like to sign up for MyChart today, if you have not already done so? No  If not, would you like information on MyChart, and how to sign up at a later time? No      Medication reconciliation up to date and corrected with patient at this time. Today's provider has been notified of reason for visit, vitals and flowsheets obtained on patients. Reviewed record in preparation for visit, huddled with provider and have obtained necessary documentation.       Health Maintenance Due   Topic    Shingrix Vaccine Age 50> (1 of 2)    FOOT EXAM Q1     Influenza Age 5 to Adult        Wt Readings from Last 3 Encounters:   19 230 lb 12.8 oz (104.7 kg)   19 226 lb (102.5 kg)   19 228 lb (103.4 kg)     Temp Readings from Last 3 Encounters:   19 98 °F (36.7 °C) (Oral)   09/10/19 98.3 °F (36.8 °C) (Temporal)   19 97.6 °F (36.4 °C)     BP Readings from Last 3 Encounters:   19 137/62   09/10/19 110/62   19 134/56     Pulse Readings from Last 3 Encounters:   19 65   09/10/19 71   19 76     Vitals:    19 1057   BP: 137/62   Pulse: 65   Resp: 20   Temp: 98 °F (36.7 °C)   TempSrc: Oral   SpO2: 96%   Weight: 230 lb 12.8 oz (104.7 kg)   Height: 6' (1.829 m)   PainSc:   0 - No pain         Learning Assessment:  :     Learning Assessment 2015   PRIMARY LEARNER Patient   BARRIERS PRIMARY LEARNER COGNITIVE   CO-LEARNER CAREGIVER Yes   CO-LEARNER NAME wife   PRIMARY LANGUAGE ENGLISH LEARNER PREFERENCE PRIMARY LISTENING     OTHER (COMMENT)     DEMONSTRATION   ANSWERED BY wife   RELATIONSHIP SELF       Depression Screening:  :     3 most recent PHQ Screens 2/25/2019   Little interest or pleasure in doing things Not at all   Feeling down, depressed, irritable, or hopeless Not at all   Total Score PHQ 2 0   Trouble falling or staying asleep, or sleeping too much -   Feeling tired or having little energy -   Poor appetite, weight loss, or overeating -   Feeling bad about yourself - or that you are a failure or have let yourself or your family down -   Trouble concentrating on things such as school, work, reading, or watching TV -   Moving or speaking so slowly that other people could have noticed; or the opposite being so fidgety that others notice -   Thoughts of being better off dead, or hurting yourself in some way -   PHQ 9 Score -       Fall Risk Assessment:  :     Fall Risk Assessment, last 12 mths 2/25/2019   Able to walk? Yes   Fall in past 12 months? No   Fall with injury? -   Number of falls in past 12 months -       Abuse Screening:  :     Abuse Screening Questionnaire 2/25/2019 7/16/2018 11/8/2017   Do you ever feel afraid of your partner? N N N   Are you in a relationship with someone who physically or mentally threatens you? N N N   Is it safe for you to go home?  Y Y Y       ADL Screening:  :     ADL Assessment 2/25/2019   Feeding yourself No Help Needed   Getting from bed to chair No Help Needed   Getting dressed No Help Needed   Bathing or showering No Help Needed   Walk across the room (includes cane/walker) Help Needed   Using the telphone No Help Needed   Taking your medications No Help Needed   Preparing meals No Help Needed   Managing money (expenses/bills) No Help Needed   Moderately strenuous housework (laundry) No Help Needed   Shopping for personal items (toiletries/medicines) No Help Needed   Shopping for groceries No Help Needed   Driving Help Needed   Climbing a flight of stairs Help Needed   Getting to places beyond walking distances Help Needed

## 2019-09-11 NOTE — PROGRESS NOTES
Here for BREA hosp f/u from recent admission for presyncope and DEREK. Trying to get to drink more. Having pbs passing urine. Pb past month. Here with daughter. Dx DEREK from recent admission and ER visit. Need urol and neph refs. Patient denies chest pain, dyspnea, unexpected weight change, unexpected pain, mood or memory changes. Visit Vitals  /62 (BP 1 Location: Right arm, BP Patient Position: Sitting)   Pulse 65   Temp 98 °F (36.7 °C) (Oral)   Resp 20   Ht 6' (1.829 m)   Wt 230 lb 12.8 oz (104.7 kg)   SpO2 96%   BMI 31.30 kg/m²     Patient alert and cooperative. Reviewed above. Assessment:  1. AODM, well controlled on Metformin, but may have to go off of this because of kidney issues. Awaiting nephrologist evaluation. Plan:  1. Recommended clear diet sodas, Crystal Lite, to increase fluid intake. 2. Flu shot given. 3. Follow otherwise here prn.

## 2019-09-12 PROCEDURE — 3331090001 HH PPS REVENUE CREDIT

## 2019-09-12 PROCEDURE — 3331090002 HH PPS REVENUE DEBIT

## 2019-09-13 ENCOUNTER — HOME CARE VISIT (OUTPATIENT)
Dept: SCHEDULING | Facility: HOME HEALTH | Age: 84
End: 2019-09-13
Payer: MEDICARE

## 2019-09-13 ENCOUNTER — TELEPHONE (OUTPATIENT)
Dept: FAMILY MEDICINE CLINIC | Age: 84
End: 2019-09-13

## 2019-09-13 VITALS
SYSTOLIC BLOOD PRESSURE: 118 MMHG | DIASTOLIC BLOOD PRESSURE: 66 MMHG | OXYGEN SATURATION: 96 % | RESPIRATION RATE: 18 BRPM | TEMPERATURE: 98.1 F | HEART RATE: 66 BPM

## 2019-09-13 PROCEDURE — 3331090002 HH PPS REVENUE DEBIT

## 2019-09-13 PROCEDURE — 3331090001 HH PPS REVENUE CREDIT

## 2019-09-13 PROCEDURE — G0299 HHS/HOSPICE OF RN EA 15 MIN: HCPCS

## 2019-09-13 NOTE — TELEPHONE ENCOUNTER
Writer called Madonna White with [phone]. Writer spoke with Madonna White. Writer notified Madonna White that Dr. Julio Anthony is out of the office today, will be back on Monday and will call back then when Dr. Julio Anthony reviews message. Madonna White verbalized understanding and appreciation.

## 2019-09-13 NOTE — TELEPHONE ENCOUNTER
Cornelio Buchanan from [place]. Home health stated that the patient have been complaining about having weakness and would like to know if  can give a V.O for PT evaluation.    P: [phone]

## 2019-09-14 PROCEDURE — 3331090002 HH PPS REVENUE DEBIT

## 2019-09-14 PROCEDURE — 3331090001 HH PPS REVENUE CREDIT

## 2019-09-15 PROCEDURE — 3331090001 HH PPS REVENUE CREDIT

## 2019-09-15 PROCEDURE — 3331090002 HH PPS REVENUE DEBIT

## 2019-09-16 PROCEDURE — 3331090002 HH PPS REVENUE DEBIT

## 2019-09-16 PROCEDURE — 3331090001 HH PPS REVENUE CREDIT

## 2019-09-16 NOTE — TELEPHONE ENCOUNTER
Writer called Nighat Monterroso to give V. O for PT eval from Dr. Roxana Potter. Nighat oMnterroso did not answer. Writer left  requesting phone call back.

## 2019-09-17 ENCOUNTER — HOME CARE VISIT (OUTPATIENT)
Dept: SCHEDULING | Facility: HOME HEALTH | Age: 84
End: 2019-09-17
Payer: MEDICARE

## 2019-09-17 VITALS
SYSTOLIC BLOOD PRESSURE: 132 MMHG | HEART RATE: 64 BPM | RESPIRATION RATE: 16 BRPM | DIASTOLIC BLOOD PRESSURE: 82 MMHG | OXYGEN SATURATION: 93 % | TEMPERATURE: 98 F

## 2019-09-17 PROCEDURE — 3331090002 HH PPS REVENUE DEBIT

## 2019-09-17 PROCEDURE — 3331090001 HH PPS REVENUE CREDIT

## 2019-09-17 PROCEDURE — G0299 HHS/HOSPICE OF RN EA 15 MIN: HCPCS

## 2019-09-18 ENCOUNTER — HOME CARE VISIT (OUTPATIENT)
Dept: SCHEDULING | Facility: HOME HEALTH | Age: 84
End: 2019-09-18
Payer: MEDICARE

## 2019-09-18 PROCEDURE — G0151 HHCP-SERV OF PT,EA 15 MIN: HCPCS

## 2019-09-18 PROCEDURE — 3331090001 HH PPS REVENUE CREDIT

## 2019-09-18 PROCEDURE — 3331090002 HH PPS REVENUE DEBIT

## 2019-09-19 PROCEDURE — 3331090001 HH PPS REVENUE CREDIT

## 2019-09-19 PROCEDURE — 3331090002 HH PPS REVENUE DEBIT

## 2019-09-20 ENCOUNTER — HOME CARE VISIT (OUTPATIENT)
Dept: SCHEDULING | Facility: HOME HEALTH | Age: 84
End: 2019-09-20
Payer: MEDICARE

## 2019-09-20 VITALS
DIASTOLIC BLOOD PRESSURE: 64 MMHG | RESPIRATION RATE: 16 BRPM | HEART RATE: 71 BPM | SYSTOLIC BLOOD PRESSURE: 118 MMHG | TEMPERATURE: 97.6 F | OXYGEN SATURATION: 95 %

## 2019-09-20 PROCEDURE — 3331090002 HH PPS REVENUE DEBIT

## 2019-09-20 PROCEDURE — G0151 HHCP-SERV OF PT,EA 15 MIN: HCPCS

## 2019-09-20 PROCEDURE — 3331090001 HH PPS REVENUE CREDIT

## 2019-09-20 PROCEDURE — G0299 HHS/HOSPICE OF RN EA 15 MIN: HCPCS

## 2019-09-21 VITALS — HEART RATE: 72 BPM | TEMPERATURE: 98.2 F | RESPIRATION RATE: 16 BRPM | OXYGEN SATURATION: 98 %

## 2019-09-21 PROCEDURE — 3331090002 HH PPS REVENUE DEBIT

## 2019-09-21 PROCEDURE — 3331090001 HH PPS REVENUE CREDIT

## 2019-09-22 PROCEDURE — 3331090001 HH PPS REVENUE CREDIT

## 2019-09-22 PROCEDURE — 3331090002 HH PPS REVENUE DEBIT

## 2019-09-23 PROCEDURE — 3331090002 HH PPS REVENUE DEBIT

## 2019-09-23 PROCEDURE — 3331090001 HH PPS REVENUE CREDIT

## 2019-09-24 ENCOUNTER — HOME CARE VISIT (OUTPATIENT)
Dept: SCHEDULING | Facility: HOME HEALTH | Age: 84
End: 2019-09-24
Payer: MEDICARE

## 2019-09-24 VITALS
HEART RATE: 65 BPM | OXYGEN SATURATION: 95 % | RESPIRATION RATE: 16 BRPM | TEMPERATURE: 97.7 F | SYSTOLIC BLOOD PRESSURE: 138 MMHG | DIASTOLIC BLOOD PRESSURE: 62 MMHG

## 2019-09-24 PROCEDURE — G0151 HHCP-SERV OF PT,EA 15 MIN: HCPCS

## 2019-09-24 PROCEDURE — 3331090002 HH PPS REVENUE DEBIT

## 2019-09-24 PROCEDURE — 3331090001 HH PPS REVENUE CREDIT

## 2019-09-25 PROCEDURE — 3331090002 HH PPS REVENUE DEBIT

## 2019-09-25 PROCEDURE — 3331090001 HH PPS REVENUE CREDIT

## 2019-09-26 ENCOUNTER — HOME CARE VISIT (OUTPATIENT)
Dept: SCHEDULING | Facility: HOME HEALTH | Age: 84
End: 2019-09-26
Payer: MEDICARE

## 2019-09-26 VITALS
RESPIRATION RATE: 16 BRPM | SYSTOLIC BLOOD PRESSURE: 118 MMHG | HEART RATE: 70 BPM | DIASTOLIC BLOOD PRESSURE: 68 MMHG | TEMPERATURE: 98.8 F | OXYGEN SATURATION: 97 %

## 2019-09-26 VITALS — RESPIRATION RATE: 16 BRPM | TEMPERATURE: 98.2 F

## 2019-09-26 PROCEDURE — G0151 HHCP-SERV OF PT,EA 15 MIN: HCPCS

## 2019-09-26 PROCEDURE — G0299 HHS/HOSPICE OF RN EA 15 MIN: HCPCS

## 2019-09-26 PROCEDURE — 3331090002 HH PPS REVENUE DEBIT

## 2019-09-26 PROCEDURE — 3331090001 HH PPS REVENUE CREDIT

## 2019-09-27 PROCEDURE — 3331090001 HH PPS REVENUE CREDIT

## 2019-09-27 PROCEDURE — 3331090002 HH PPS REVENUE DEBIT

## 2019-09-28 PROCEDURE — 3331090002 HH PPS REVENUE DEBIT

## 2019-09-28 PROCEDURE — 3331090001 HH PPS REVENUE CREDIT

## 2019-09-29 PROCEDURE — 3331090002 HH PPS REVENUE DEBIT

## 2019-09-29 PROCEDURE — 3331090001 HH PPS REVENUE CREDIT

## 2019-09-30 PROCEDURE — 3331090002 HH PPS REVENUE DEBIT

## 2019-09-30 PROCEDURE — 3331090001 HH PPS REVENUE CREDIT

## 2019-10-01 ENCOUNTER — HOME CARE VISIT (OUTPATIENT)
Dept: SCHEDULING | Facility: HOME HEALTH | Age: 84
End: 2019-10-01
Payer: MEDICARE

## 2019-10-01 VITALS
TEMPERATURE: 98.2 F | DIASTOLIC BLOOD PRESSURE: 72 MMHG | SYSTOLIC BLOOD PRESSURE: 102 MMHG | HEART RATE: 62 BPM | RESPIRATION RATE: 18 BRPM | OXYGEN SATURATION: 98 %

## 2019-10-01 PROCEDURE — 3331090001 HH PPS REVENUE CREDIT

## 2019-10-01 PROCEDURE — G0151 HHCP-SERV OF PT,EA 15 MIN: HCPCS

## 2019-10-01 PROCEDURE — G0299 HHS/HOSPICE OF RN EA 15 MIN: HCPCS

## 2019-10-01 PROCEDURE — 3331090002 HH PPS REVENUE DEBIT

## 2019-10-02 VITALS
TEMPERATURE: 98.2 F | OXYGEN SATURATION: 98 % | SYSTOLIC BLOOD PRESSURE: 102 MMHG | HEART RATE: 62 BPM | RESPIRATION RATE: 16 BRPM | DIASTOLIC BLOOD PRESSURE: 72 MMHG

## 2019-10-02 PROCEDURE — 3331090001 HH PPS REVENUE CREDIT

## 2019-10-02 PROCEDURE — 3331090002 HH PPS REVENUE DEBIT

## 2019-10-03 PROCEDURE — 3331090002 HH PPS REVENUE DEBIT

## 2019-10-03 PROCEDURE — 3331090001 HH PPS REVENUE CREDIT

## 2019-10-04 ENCOUNTER — HOME CARE VISIT (OUTPATIENT)
Dept: SCHEDULING | Facility: HOME HEALTH | Age: 84
End: 2019-10-04
Payer: MEDICARE

## 2019-10-04 PROCEDURE — G0151 HHCP-SERV OF PT,EA 15 MIN: HCPCS

## 2019-10-04 PROCEDURE — 3331090001 HH PPS REVENUE CREDIT

## 2019-10-04 PROCEDURE — 3331090002 HH PPS REVENUE DEBIT

## 2019-10-05 PROCEDURE — 3331090002 HH PPS REVENUE DEBIT

## 2019-10-05 PROCEDURE — 3331090001 HH PPS REVENUE CREDIT

## 2019-10-06 PROCEDURE — 3331090001 HH PPS REVENUE CREDIT

## 2019-10-06 PROCEDURE — 3331090002 HH PPS REVENUE DEBIT

## 2019-10-07 PROCEDURE — 3331090001 HH PPS REVENUE CREDIT

## 2019-10-07 PROCEDURE — 3331090002 HH PPS REVENUE DEBIT

## 2019-10-08 ENCOUNTER — HOME CARE VISIT (OUTPATIENT)
Dept: SCHEDULING | Facility: HOME HEALTH | Age: 84
End: 2019-10-08
Payer: MEDICARE

## 2019-10-08 PROCEDURE — 3331090001 HH PPS REVENUE CREDIT

## 2019-10-08 PROCEDURE — G0151 HHCP-SERV OF PT,EA 15 MIN: HCPCS

## 2019-10-08 PROCEDURE — 3331090002 HH PPS REVENUE DEBIT

## 2019-10-09 PROCEDURE — 3331090002 HH PPS REVENUE DEBIT

## 2019-10-09 PROCEDURE — 3331090001 HH PPS REVENUE CREDIT

## 2019-10-10 PROCEDURE — 3331090001 HH PPS REVENUE CREDIT

## 2019-10-10 PROCEDURE — 3331090002 HH PPS REVENUE DEBIT

## 2019-10-11 ENCOUNTER — HOME CARE VISIT (OUTPATIENT)
Dept: SCHEDULING | Facility: HOME HEALTH | Age: 84
End: 2019-10-11
Payer: MEDICARE

## 2019-10-11 VITALS
DIASTOLIC BLOOD PRESSURE: 64 MMHG | OXYGEN SATURATION: 96 % | HEART RATE: 78 BPM | TEMPERATURE: 98.1 F | SYSTOLIC BLOOD PRESSURE: 112 MMHG | RESPIRATION RATE: 16 BRPM

## 2019-10-11 PROCEDURE — 3331090002 HH PPS REVENUE DEBIT

## 2019-10-11 PROCEDURE — 3331090001 HH PPS REVENUE CREDIT

## 2019-10-11 PROCEDURE — G0151 HHCP-SERV OF PT,EA 15 MIN: HCPCS

## 2019-10-11 PROCEDURE — 3331090003 HH PPS REVENUE ADJ

## 2019-10-12 PROCEDURE — 3331090001 HH PPS REVENUE CREDIT

## 2019-10-12 PROCEDURE — 3331090002 HH PPS REVENUE DEBIT

## 2019-10-13 DIAGNOSIS — E11.9 TYPE 2 DIABETES MELLITUS WITHOUT COMPLICATION, WITHOUT LONG-TERM CURRENT USE OF INSULIN (HCC): ICD-10-CM

## 2019-10-13 PROCEDURE — 3331090001 HH PPS REVENUE CREDIT

## 2019-10-13 PROCEDURE — 3331090002 HH PPS REVENUE DEBIT

## 2019-10-14 RX ORDER — LISINOPRIL 5 MG/1
TABLET ORAL
Qty: 90 TAB | Refills: 0 | OUTPATIENT
Start: 2019-10-14

## 2019-10-17 ENCOUNTER — PATIENT OUTREACH (OUTPATIENT)
Dept: FAMILY MEDICINE CLINIC | Age: 84
End: 2019-10-17
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
## 2019-11-26 NOTE — TELEPHONE ENCOUNTER
PCP: Garland Fritz MD    Last appt: 9/11/2019  Future Appointments   Date Time Provider Josh Hernández   2/12/2020  1:15 PM Pavan Perez MD [facility] SCHED   2/25/2020  9:30 AM Pavan Perez MD [facility] SCHED   3/19/2020  2:30 PM Gabby Orellana, [id number] [facility]   3/19/2020  2:40 PM Lynne Nixon  [address]       Requested Prescriptions     Pending Prescriptions Disp Refills    [facility] ER) 500 mg TG24 24 hour tablet       Sig: Take 1,000 mg by mouth every evening.

## 2019-12-30 NOTE — TELEPHONE ENCOUNTER
Requested Prescriptions     Pending Prescriptions Disp Refills    lisinopril (PRINIVIL, ZESTRIL) 2.5 mg tablet 30 Tab 2     Sig: Take 1 Tab by mouth daily.

## 2020-01-01 ENCOUNTER — APPOINTMENT (OUTPATIENT)
Dept: GENERAL RADIOLOGY | Age: 85
DRG: 871 | End: 2020-01-01
Attending: INTERNAL MEDICINE
Payer: MEDICARE

## 2020-01-01 ENCOUNTER — OFFICE VISIT (OUTPATIENT)
Dept: CARDIOLOGY CLINIC | Age: 85
End: 2020-01-01
Payer: MEDICARE

## 2020-01-01 ENCOUNTER — APPOINTMENT (OUTPATIENT)
Dept: GENERAL RADIOLOGY | Age: 85
DRG: 871 | End: 2020-01-01
Attending: EMERGENCY MEDICINE
Payer: MEDICARE

## 2020-01-01 ENCOUNTER — HOSPITAL ENCOUNTER (INPATIENT)
Age: 85
LOS: 2 days | DRG: 871 | End: 2020-11-03
Attending: EMERGENCY MEDICINE | Admitting: FAMILY MEDICINE
Payer: MEDICARE

## 2020-01-01 ENCOUNTER — HOSPITAL ENCOUNTER (EMERGENCY)
Dept: CT IMAGING | Age: 85
Discharge: HOME | DRG: 871 | End: 2020-11-01
Attending: EMERGENCY MEDICINE
Payer: MEDICARE

## 2020-01-01 VITALS
SYSTOLIC BLOOD PRESSURE: 105 MMHG | HEIGHT: 72 IN | RESPIRATION RATE: 28 BRPM | WEIGHT: 217.37 LBS | TEMPERATURE: 97.4 F | DIASTOLIC BLOOD PRESSURE: 73 MMHG | BODY MASS INDEX: 29.44 KG/M2 | OXYGEN SATURATION: 100 % | HEART RATE: 65 BPM

## 2020-01-01 VITALS
WEIGHT: 222 LBS | BODY MASS INDEX: 30.07 KG/M2 | HEIGHT: 72 IN | SYSTOLIC BLOOD PRESSURE: 122 MMHG | DIASTOLIC BLOOD PRESSURE: 76 MMHG | HEART RATE: 74 BPM

## 2020-01-01 DIAGNOSIS — Z71.89 GOALS OF CARE, COUNSELING/DISCUSSION: ICD-10-CM

## 2020-01-01 DIAGNOSIS — A41.9 SEVERE SEPSIS (HCC): Primary | ICD-10-CM

## 2020-01-01 DIAGNOSIS — Z51.5 END OF LIFE CARE: ICD-10-CM

## 2020-01-01 DIAGNOSIS — K72.00 SHOCK LIVER: ICD-10-CM

## 2020-01-01 DIAGNOSIS — U07.1 COVID-19: ICD-10-CM

## 2020-01-01 DIAGNOSIS — G93.41 METABOLIC ENCEPHALOPATHY: ICD-10-CM

## 2020-01-01 DIAGNOSIS — I10 ESSENTIAL HYPERTENSION: ICD-10-CM

## 2020-01-01 DIAGNOSIS — E66.01 SEVERE OBESITY (HCC): ICD-10-CM

## 2020-01-01 DIAGNOSIS — R65.20 SEVERE SEPSIS (HCC): Primary | ICD-10-CM

## 2020-01-01 DIAGNOSIS — I25.10 CORONARY ARTERY DISEASE INVOLVING NATIVE CORONARY ARTERY OF NATIVE HEART WITHOUT ANGINA PECTORIS: Primary | ICD-10-CM

## 2020-01-01 LAB
ABO + RH BLD: NORMAL
ALBUMIN SERPL-MCNC: 2.1 G/DL (ref 3.5–5)
ALBUMIN SERPL-MCNC: 2.7 G/DL (ref 3.5–5)
ALBUMIN SERPL-MCNC: 3.1 G/DL (ref 3.5–5)
ALBUMIN SERPL-MCNC: NORMAL G/DL (ref 3.5–5)
ALBUMIN/GLOB SERPL: 0.8 {RATIO} (ref 1.1–2.2)
ALBUMIN/GLOB SERPL: 0.8 {RATIO} (ref 1.1–2.2)
ALBUMIN/GLOB SERPL: NORMAL {RATIO} (ref 1.1–2.2)
ALP SERPL-CCNC: 61 U/L (ref 45–117)
ALP SERPL-CCNC: 71 U/L (ref 45–117)
ALP SERPL-CCNC: NORMAL U/L (ref 50–136)
ALT SERPL-CCNC: 30 U/L (ref 12–78)
ALT SERPL-CCNC: >3500 U/L (ref 12–78)
ALT SERPL-CCNC: NORMAL U/L (ref 30–65)
AMORPH CRY URNS QL MICRO: ABNORMAL
ANION GAP SERPL CALC-SCNC: 12 MMOL/L (ref 5–15)
ANION GAP SERPL CALC-SCNC: 15 MMOL/L (ref 5–15)
ANION GAP SERPL CALC-SCNC: 19 MMOL/L (ref 5–15)
ANION GAP SERPL CALC-SCNC: 20 MMOL/L (ref 5–15)
ANION GAP SERPL CALC-SCNC: 23 MMOL/L (ref 5–15)
ANION GAP SERPL CALC-SCNC: 8 MMOL/L (ref 5–15)
ANION GAP SERPL CALC-SCNC: NORMAL MMOL/L (ref 5–15)
APPEARANCE UR: ABNORMAL
APTT PPP: 39.8 SEC (ref 22.1–32)
APTT PPP: 40.2 SEC (ref 22.1–32)
APTT PPP: 44.4 SEC (ref 22.1–32)
ARTERIAL PATENCY WRIST A: YES
AST SERPL-CCNC: 89 U/L (ref 15–37)
AST SERPL-CCNC: >2000 U/L (ref 15–37)
AST SERPL-CCNC: NORMAL U/L (ref 15–37)
ATRIAL RATE: 109 BPM
ATRIAL RATE: 93 BPM
BACTERIA SPEC CULT: NORMAL
BACTERIA URNS QL MICRO: NEGATIVE /HPF
BASE DEFICIT BLD-SCNC: 19 MMOL/L
BASOPHILS # BLD: 0 K/UL (ref 0–0.1)
BASOPHILS NFR BLD: 0 % (ref 0–1)
BDY SITE: ABNORMAL
BILIRUB DIRECT SERPL-MCNC: 0.7 MG/DL (ref 0–0.2)
BILIRUB SERPL-MCNC: 0.5 MG/DL (ref 0.2–1)
BILIRUB SERPL-MCNC: 1.3 MG/DL (ref 0.2–1)
BILIRUB SERPL-MCNC: NORMAL MG/DL
BILIRUB UR QL: NEGATIVE
BLOOD GROUP ANTIBODIES SERPL: NORMAL
BUN SERPL-MCNC: 51 MG/DL (ref 6–20)
BUN SERPL-MCNC: 61 MG/DL (ref 6–20)
BUN SERPL-MCNC: 73 MG/DL (ref 6–20)
BUN SERPL-MCNC: 73 MG/DL (ref 6–20)
BUN SERPL-MCNC: 75 MG/DL (ref 6–20)
BUN SERPL-MCNC: 83 MG/DL (ref 6–20)
BUN SERPL-MCNC: NORMAL MG/DL (ref 6–20)
BUN/CREAT SERPL: 19 (ref 12–20)
BUN/CREAT SERPL: 20 (ref 12–20)
BUN/CREAT SERPL: 21 (ref 12–20)
BUN/CREAT SERPL: 24 (ref 12–20)
BUN/CREAT SERPL: NORMAL (ref 12–20)
CA-I BLD-SCNC: 1.09 MMOL/L (ref 1.12–1.32)
CALCIUM SERPL-MCNC: 6.7 MG/DL (ref 8.5–10.1)
CALCIUM SERPL-MCNC: 7.3 MG/DL (ref 8.5–10.1)
CALCIUM SERPL-MCNC: 7.8 MG/DL (ref 8.5–10.1)
CALCIUM SERPL-MCNC: 8.1 MG/DL (ref 8.5–10.1)
CALCIUM SERPL-MCNC: 8.6 MG/DL (ref 8.5–10.1)
CALCIUM SERPL-MCNC: 8.8 MG/DL (ref 8.5–10.1)
CALCIUM SERPL-MCNC: NORMAL MG/DL (ref 8.5–10.1)
CALCULATED P AXIS, ECG09: 60 DEGREES
CALCULATED R AXIS, ECG10: -54 DEGREES
CALCULATED R AXIS, ECG10: 32 DEGREES
CALCULATED T AXIS, ECG11: 15 DEGREES
CALCULATED T AXIS, ECG11: 30 DEGREES
CHLORIDE SERPL-SCNC: 103 MMOL/L (ref 97–108)
CHLORIDE SERPL-SCNC: 106 MMOL/L (ref 97–108)
CHLORIDE SERPL-SCNC: 106 MMOL/L (ref 97–108)
CHLORIDE SERPL-SCNC: 109 MMOL/L (ref 97–108)
CHLORIDE SERPL-SCNC: 111 MMOL/L (ref 97–108)
CHLORIDE SERPL-SCNC: 111 MMOL/L (ref 97–108)
CHLORIDE SERPL-SCNC: NORMAL MMOL/L (ref 97–108)
CO2 SERPL-SCNC: 12 MMOL/L (ref 21–32)
CO2 SERPL-SCNC: 19 MMOL/L (ref 21–32)
CO2 SERPL-SCNC: 19 MMOL/L (ref 21–32)
CO2 SERPL-SCNC: 21 MMOL/L (ref 21–32)
CO2 SERPL-SCNC: 24 MMOL/L (ref 21–32)
CO2 SERPL-SCNC: 25 MMOL/L (ref 21–32)
CO2 SERPL-SCNC: NORMAL MMOL/L (ref 21–32)
COLOR UR: ABNORMAL
COMMENT, HOLDF: NORMAL
COVID-19 RAPID TEST, COVR: DETECTED
CREAT SERPL-MCNC: 2.52 MG/DL (ref 0.7–1.3)
CREAT SERPL-MCNC: 2.55 MG/DL (ref 0.7–1.3)
CREAT SERPL-MCNC: 3.6 MG/DL (ref 0.7–1.3)
CREAT SERPL-MCNC: 3.63 MG/DL (ref 0.7–1.3)
CREAT SERPL-MCNC: 3.74 MG/DL (ref 0.7–1.3)
CREAT SERPL-MCNC: 4.37 MG/DL (ref 0.7–1.3)
CREAT SERPL-MCNC: NORMAL MG/DL
CRP SERPL-MCNC: 18.6 MG/DL (ref 0–0.6)
D DIMER PPP FEU-MCNC: 1.19 MG/L FEU (ref 0–0.65)
D DIMER PPP FEU-MCNC: 1.31 MG/L FEU (ref 0–0.65)
D DIMER PPP FEU-MCNC: >35.2 MG/L FEU (ref 0–0.65)
DIAGNOSIS, 93000: NORMAL
DIAGNOSIS, 93000: NORMAL
DIFFERENTIAL METHOD BLD: ABNORMAL
EOSINOPHIL # BLD: 0 K/UL (ref 0–0.4)
EOSINOPHIL NFR BLD: 0 % (ref 0–7)
EPITH CASTS URNS QL MICRO: ABNORMAL /LPF
ERYTHROCYTE [DISTWIDTH] IN BLOOD BY AUTOMATED COUNT: 13.4 % (ref 11.5–14.5)
ERYTHROCYTE [DISTWIDTH] IN BLOOD BY AUTOMATED COUNT: 14 % (ref 11.5–14.5)
ERYTHROCYTE [DISTWIDTH] IN BLOOD BY AUTOMATED COUNT: 14.2 % (ref 11.5–14.5)
FERRITIN SERPL-MCNC: ABNORMAL NG/ML (ref 26–388)
FIBRINOGEN PPP-MCNC: 467 MG/DL (ref 200–475)
GAS FLOW.O2 O2 DELIVERY SYS: ABNORMAL L/MIN
GLOBULIN SER CALC-MCNC: 3.2 G/DL (ref 2–4)
GLOBULIN SER CALC-MCNC: 3.8 G/DL (ref 2–4)
GLOBULIN SER CALC-MCNC: NORMAL G/DL (ref 2–4)
GLUCOSE BLD STRIP.AUTO-MCNC: 148 MG/DL (ref 65–100)
GLUCOSE BLD STRIP.AUTO-MCNC: 277 MG/DL (ref 65–100)
GLUCOSE BLD STRIP.AUTO-MCNC: 351 MG/DL (ref 65–100)
GLUCOSE BLD STRIP.AUTO-MCNC: 442 MG/DL (ref 65–100)
GLUCOSE BLD STRIP.AUTO-MCNC: 458 MG/DL (ref 65–100)
GLUCOSE SERPL-MCNC: 156 MG/DL (ref 65–100)
GLUCOSE SERPL-MCNC: 176 MG/DL (ref 65–100)
GLUCOSE SERPL-MCNC: 214 MG/DL (ref 65–100)
GLUCOSE SERPL-MCNC: 313 MG/DL (ref 65–100)
GLUCOSE SERPL-MCNC: 351 MG/DL (ref 65–100)
GLUCOSE SERPL-MCNC: 429 MG/DL (ref 65–100)
GLUCOSE SERPL-MCNC: NORMAL MG/DL (ref 50–100)
GLUCOSE UR STRIP.AUTO-MCNC: NEGATIVE MG/DL
HCO3 BLD-SCNC: 9.3 MMOL/L (ref 22–26)
HCT VFR BLD AUTO: 37.8 % (ref 36.6–50.3)
HCT VFR BLD AUTO: 38.3 % (ref 36.6–50.3)
HCT VFR BLD AUTO: 39.8 % (ref 36.6–50.3)
HGB BLD-MCNC: 11.1 G/DL (ref 12.1–17)
HGB BLD-MCNC: 11.6 G/DL (ref 12.1–17)
HGB BLD-MCNC: 11.7 G/DL (ref 12.1–17)
HGB UR QL STRIP: ABNORMAL
IMM GRANULOCYTES # BLD AUTO: 0 K/UL
IMM GRANULOCYTES # BLD AUTO: 0 K/UL (ref 0–0.04)
IMM GRANULOCYTES # BLD AUTO: 0.2 K/UL (ref 0–0.04)
IMM GRANULOCYTES NFR BLD AUTO: 0 %
IMM GRANULOCYTES NFR BLD AUTO: 0 % (ref 0–0.5)
IMM GRANULOCYTES NFR BLD AUTO: 2 % (ref 0–0.5)
INR PPP: 1.5 (ref 0.9–1.1)
KETONES UR QL STRIP.AUTO: ABNORMAL MG/DL
LACTATE SERPL-SCNC: 1.4 MMOL/L (ref 0.4–2)
LACTATE SERPL-SCNC: 14.6 MMOL/L (ref 0.4–2)
LACTATE SERPL-SCNC: 2.2 MMOL/L (ref 0.4–2)
LACTATE SERPL-SCNC: 7.4 MMOL/L (ref 0.4–2)
LACTATE SERPL-SCNC: 8 MMOL/L (ref 0.4–2)
LDH SERPL L TO P-CCNC: 404 U/L (ref 85–241)
LEUKOCYTE ESTERASE UR QL STRIP.AUTO: NEGATIVE
LYMPHOCYTES # BLD: 0.4 K/UL (ref 0.8–3.5)
LYMPHOCYTES # BLD: 1 K/UL (ref 0.8–3.5)
LYMPHOCYTES # BLD: 1.1 K/UL (ref 0.8–3.5)
LYMPHOCYTES NFR BLD: 10 % (ref 12–49)
LYMPHOCYTES NFR BLD: 16 % (ref 12–49)
LYMPHOCYTES NFR BLD: 3 % (ref 12–49)
MAGNESIUM SERPL-MCNC: 3 MG/DL (ref 1.6–2.4)
MCH RBC QN AUTO: 27.6 PG (ref 26–34)
MCH RBC QN AUTO: 28.2 PG (ref 26–34)
MCH RBC QN AUTO: 28.3 PG (ref 26–34)
MCHC RBC AUTO-ENTMCNC: 29.1 G/DL (ref 30–36.5)
MCHC RBC AUTO-ENTMCNC: 29.4 G/DL (ref 30–36.5)
MCHC RBC AUTO-ENTMCNC: 30.5 G/DL (ref 30–36.5)
MCV RBC AUTO: 92.7 FL (ref 80–99)
MCV RBC AUTO: 94 FL (ref 80–99)
MCV RBC AUTO: 96.8 FL (ref 80–99)
METAMYELOCYTES NFR BLD MANUAL: 1 %
MONOCYTES # BLD: 0 K/UL (ref 0–1)
MONOCYTES # BLD: 0.3 K/UL (ref 0–1)
MONOCYTES # BLD: 0.4 K/UL (ref 0–1)
MONOCYTES NFR BLD: 0 % (ref 5–13)
MONOCYTES NFR BLD: 3 % (ref 5–13)
MONOCYTES NFR BLD: 7 % (ref 5–13)
MYELOCYTES NFR BLD MANUAL: 1 %
NEUTS BAND NFR BLD MANUAL: 5 % (ref 0–6)
NEUTS SEG # BLD: 14.1 K/UL (ref 1.8–8)
NEUTS SEG # BLD: 4.8 K/UL (ref 1.8–8)
NEUTS SEG # BLD: 9.7 K/UL (ref 1.8–8)
NEUTS SEG NFR BLD: 77 % (ref 32–75)
NEUTS SEG NFR BLD: 86 % (ref 32–75)
NEUTS SEG NFR BLD: 90 % (ref 32–75)
NITRITE UR QL STRIP.AUTO: NEGATIVE
NRBC # BLD: 0 K/UL (ref 0–0.01)
NRBC # BLD: 0.02 K/UL (ref 0–0.01)
NRBC # BLD: 0.02 K/UL (ref 0–0.01)
NRBC BLD-RTO: 0 PER 100 WBC
NRBC BLD-RTO: 0.1 PER 100 WBC
NRBC BLD-RTO: 0.2 PER 100 WBC
O2/TOTAL GAS SETTING VFR VENT: 21 %
P-R INTERVAL, ECG05: 112 MS
P-R INTERVAL, ECG05: 114 MS
PCO2 BLD: 24.8 MMHG (ref 35–45)
PH BLD: 7.19 [PH] (ref 7.35–7.45)
PH UR STRIP: 5 [PH] (ref 5–8)
PHOSPHATE SERPL-MCNC: 8.5 MG/DL (ref 2.6–4.7)
PHOSPHATE SERPL-MCNC: 9.8 MG/DL (ref 2.6–4.7)
PLATELET # BLD AUTO: 114 K/UL (ref 150–400)
PLATELET # BLD AUTO: 183 K/UL (ref 150–400)
PLATELET # BLD AUTO: 202 K/UL (ref 150–400)
PMV BLD AUTO: 10.2 FL (ref 8.9–12.9)
PMV BLD AUTO: 10.7 FL (ref 8.9–12.9)
PMV BLD AUTO: 11.6 FL (ref 8.9–12.9)
PO2 BLD: 33 MMHG (ref 80–100)
POTASSIUM SERPL-SCNC: 4.3 MMOL/L (ref 3.5–5.1)
POTASSIUM SERPL-SCNC: 4.8 MMOL/L (ref 3.5–5.1)
POTASSIUM SERPL-SCNC: 5.5 MMOL/L (ref 3.5–5.1)
POTASSIUM SERPL-SCNC: 5.5 MMOL/L (ref 3.5–5.1)
POTASSIUM SERPL-SCNC: 5.6 MMOL/L (ref 3.5–5.1)
POTASSIUM SERPL-SCNC: 6.7 MMOL/L (ref 3.5–5.1)
POTASSIUM SERPL-SCNC: NORMAL MMOL/L (ref 3.5–5.1)
PROCALCITONIN SERPL-MCNC: 0.22 NG/ML
PROT SERPL-MCNC: 5.9 G/DL (ref 6.4–8.2)
PROT SERPL-MCNC: 6.9 G/DL (ref 6.4–8.2)
PROT SERPL-MCNC: NORMAL G/DL (ref 6.4–8.2)
PROT UR STRIP-MCNC: >300 MG/DL
PROTHROMBIN TIME: 15.1 SEC (ref 9–11.1)
Q-T INTERVAL, ECG07: 352 MS
Q-T INTERVAL, ECG07: 394 MS
QRS DURATION, ECG06: 114 MS
QRS DURATION, ECG06: 92 MS
QTC CALCULATION (BEZET), ECG08: 437 MS
QTC CALCULATION (BEZET), ECG08: 530 MS
RBC # BLD AUTO: 4.02 M/UL (ref 4.1–5.7)
RBC # BLD AUTO: 4.11 M/UL (ref 4.1–5.7)
RBC # BLD AUTO: 4.13 M/UL (ref 4.1–5.7)
RBC #/AREA URNS HPF: ABNORMAL /HPF (ref 0–5)
RBC MORPH BLD: ABNORMAL
SAMPLES BEING HELD,HOLD: NORMAL
SAO2 % BLD: 50 % (ref 92–97)
SERVICE CMNT-IMP: ABNORMAL
SERVICE CMNT-IMP: NORMAL
SODIUM SERPL-SCNC: 138 MMOL/L (ref 136–145)
SODIUM SERPL-SCNC: 143 MMOL/L (ref 136–145)
SODIUM SERPL-SCNC: 144 MMOL/L (ref 136–145)
SODIUM SERPL-SCNC: 145 MMOL/L (ref 136–145)
SODIUM SERPL-SCNC: 146 MMOL/L (ref 136–145)
SODIUM SERPL-SCNC: 147 MMOL/L (ref 136–145)
SODIUM SERPL-SCNC: NORMAL MMOL/L (ref 136–145)
SOURCE, COVRS: ABNORMAL
SP GR UR REFRACTOMETRY: 1.02 (ref 1–1.03)
SPECIMEN EXP DATE BLD: NORMAL
SPECIMEN SOURCE, FCOV2M: ABNORMAL
SPECIMEN TYPE, XMCV1T: ABNORMAL
SPECIMEN TYPE: ABNORMAL
THERAPEUTIC RANGE,PTTT: ABNORMAL SECS (ref 58–77)
TOTAL RESP. RATE, ITRR: 25
TROPONIN I SERPL-MCNC: 1.38 NG/ML
TROPONIN I SERPL-MCNC: 1.8 NG/ML
TROPONIN I SERPL-MCNC: NORMAL NG/ML
UROBILINOGEN UR QL STRIP.AUTO: 0.2 EU/DL (ref 0.2–1)
VENTRICULAR RATE, ECG03: 109 BPM
VENTRICULAR RATE, ECG03: 93 BPM
WBC # BLD AUTO: 11.3 K/UL (ref 4.1–11.1)
WBC # BLD AUTO: 14.8 K/UL (ref 4.1–11.1)
WBC # BLD AUTO: 6.2 K/UL (ref 4.1–11.1)
WBC MORPH BLD: ABNORMAL
WBC URNS QL MICRO: ABNORMAL /HPF (ref 0–4)

## 2020-01-01 PROCEDURE — 74011000258 HC RX REV CODE- 258: Performed by: INTERNAL MEDICINE

## 2020-01-01 PROCEDURE — 74011250636 HC RX REV CODE- 250/636: Performed by: INTERNAL MEDICINE

## 2020-01-01 PROCEDURE — 80048 BASIC METABOLIC PNL TOTAL CA: CPT

## 2020-01-01 PROCEDURE — 84484 ASSAY OF TROPONIN QUANT: CPT

## 2020-01-01 PROCEDURE — 74011250636 HC RX REV CODE- 250/636: Performed by: EMERGENCY MEDICINE

## 2020-01-01 PROCEDURE — 84145 PROCALCITONIN (PCT): CPT

## 2020-01-01 PROCEDURE — 74011250637 HC RX REV CODE- 250/637: Performed by: FAMILY MEDICINE

## 2020-01-01 PROCEDURE — C1894 INTRO/SHEATH, NON-LASER: HCPCS

## 2020-01-01 PROCEDURE — 74011000250 HC RX REV CODE- 250: Performed by: NURSE PRACTITIONER

## 2020-01-01 PROCEDURE — 74011000250 HC RX REV CODE- 250

## 2020-01-01 PROCEDURE — 85379 FIBRIN DEGRADATION QUANT: CPT

## 2020-01-01 PROCEDURE — 94003 VENT MGMT INPAT SUBQ DAY: CPT

## 2020-01-01 PROCEDURE — 74011250636 HC RX REV CODE- 250/636: Performed by: NURSE PRACTITIONER

## 2020-01-01 PROCEDURE — 74011636637 HC RX REV CODE- 636/637: Performed by: NURSE PRACTITIONER

## 2020-01-01 PROCEDURE — 83615 LACTATE (LD) (LDH) ENZYME: CPT

## 2020-01-01 PROCEDURE — 03HY32Z INSERTION OF MONITORING DEVICE INTO UPPER ARTERY, PERCUTANEOUS APPROACH: ICD-10-PCS | Performed by: ANESTHESIOLOGY

## 2020-01-01 PROCEDURE — 74011000258 HC RX REV CODE- 258: Performed by: NURSE PRACTITIONER

## 2020-01-01 PROCEDURE — 36415 COLL VENOUS BLD VENIPUNCTURE: CPT

## 2020-01-01 PROCEDURE — G8536 NO DOC ELDER MAL SCRN: HCPCS | Performed by: INTERNAL MEDICINE

## 2020-01-01 PROCEDURE — 99223 1ST HOSP IP/OBS HIGH 75: CPT | Performed by: INTERNAL MEDICINE

## 2020-01-01 PROCEDURE — 77010033678 HC OXYGEN DAILY

## 2020-01-01 PROCEDURE — 85730 THROMBOPLASTIN TIME PARTIAL: CPT

## 2020-01-01 PROCEDURE — 77030018865 HC TRNSDCR PRSS MON EDWD -A

## 2020-01-01 PROCEDURE — G8419 CALC BMI OUT NRM PARAM NOF/U: HCPCS | Performed by: INTERNAL MEDICINE

## 2020-01-01 PROCEDURE — 74011000258 HC RX REV CODE- 258: Performed by: EMERGENCY MEDICINE

## 2020-01-01 PROCEDURE — 5A1935Z RESPIRATORY VENTILATION, LESS THAN 24 CONSECUTIVE HOURS: ICD-10-PCS | Performed by: FAMILY MEDICINE

## 2020-01-01 PROCEDURE — 74011250636 HC RX REV CODE- 250/636: Performed by: FAMILY MEDICINE

## 2020-01-01 PROCEDURE — 85025 COMPLETE CBC W/AUTO DIFF WBC: CPT

## 2020-01-01 PROCEDURE — 81001 URINALYSIS AUTO W/SCOPE: CPT

## 2020-01-01 PROCEDURE — 77030013797 HC KT TRNSDUC PRSSR EDWD -A

## 2020-01-01 PROCEDURE — 87635 SARS-COV-2 COVID-19 AMP PRB: CPT

## 2020-01-01 PROCEDURE — 99285 EMERGENCY DEPT VISIT HI MDM: CPT

## 2020-01-01 PROCEDURE — 93005 ELECTROCARDIOGRAM TRACING: CPT

## 2020-01-01 PROCEDURE — P9045 ALBUMIN (HUMAN), 5%, 250 ML: HCPCS | Performed by: NURSE PRACTITIONER

## 2020-01-01 PROCEDURE — 77030005402 HC CATH RAD ART LN KT TELE -B

## 2020-01-01 PROCEDURE — 86900 BLOOD TYPING SEROLOGIC ABO: CPT

## 2020-01-01 PROCEDURE — 83605 ASSAY OF LACTIC ACID: CPT

## 2020-01-01 PROCEDURE — 80076 HEPATIC FUNCTION PANEL: CPT

## 2020-01-01 PROCEDURE — XW033E5 INTRODUCTION OF REMDESIVIR ANTI-INFECTIVE INTO PERIPHERAL VEIN, PERCUTANEOUS APPROACH, NEW TECHNOLOGY GROUP 5: ICD-10-PCS | Performed by: FAMILY MEDICINE

## 2020-01-01 PROCEDURE — 82962 GLUCOSE BLOOD TEST: CPT

## 2020-01-01 PROCEDURE — 84100 ASSAY OF PHOSPHORUS: CPT

## 2020-01-01 PROCEDURE — 77030005513 HC CATH URETH FOL11 MDII -B

## 2020-01-01 PROCEDURE — 94002 VENT MGMT INPAT INIT DAY: CPT

## 2020-01-01 PROCEDURE — G8752 SYS BP LESS 140: HCPCS | Performed by: INTERNAL MEDICINE

## 2020-01-01 PROCEDURE — 87040 BLOOD CULTURE FOR BACTERIA: CPT

## 2020-01-01 PROCEDURE — 85610 PROTHROMBIN TIME: CPT

## 2020-01-01 PROCEDURE — 74011000258 HC RX REV CODE- 258: Performed by: FAMILY MEDICINE

## 2020-01-01 PROCEDURE — 83520 IMMUNOASSAY QUANT NOS NONAB: CPT

## 2020-01-01 PROCEDURE — 80069 RENAL FUNCTION PANEL: CPT

## 2020-01-01 PROCEDURE — 82803 BLOOD GASES ANY COMBINATION: CPT

## 2020-01-01 PROCEDURE — 74011000250 HC RX REV CODE- 250: Performed by: INTERNAL MEDICINE

## 2020-01-01 PROCEDURE — 0BH17EZ INSERTION OF ENDOTRACHEAL AIRWAY INTO TRACHEA, VIA NATURAL OR ARTIFICIAL OPENING: ICD-10-PCS | Performed by: FAMILY MEDICINE

## 2020-01-01 PROCEDURE — 1101F PT FALLS ASSESS-DOCD LE1/YR: CPT | Performed by: INTERNAL MEDICINE

## 2020-01-01 PROCEDURE — 36430 TRANSFUSION BLD/BLD COMPNT: CPT

## 2020-01-01 PROCEDURE — 82728 ASSAY OF FERRITIN: CPT

## 2020-01-01 PROCEDURE — 73502 X-RAY EXAM HIP UNI 2-3 VIEWS: CPT

## 2020-01-01 PROCEDURE — 74011250636 HC RX REV CODE- 250/636

## 2020-01-01 PROCEDURE — 94760 N-INVAS EAR/PLS OXIMETRY 1: CPT

## 2020-01-01 PROCEDURE — 99214 OFFICE O/P EST MOD 30 MIN: CPT | Performed by: INTERNAL MEDICINE

## 2020-01-01 PROCEDURE — 70450 CT HEAD/BRAIN W/O DYE: CPT

## 2020-01-01 PROCEDURE — 71045 X-RAY EXAM CHEST 1 VIEW: CPT

## 2020-01-01 PROCEDURE — G8754 DIAS BP LESS 90: HCPCS | Performed by: INTERNAL MEDICINE

## 2020-01-01 PROCEDURE — 83735 ASSAY OF MAGNESIUM: CPT

## 2020-01-01 PROCEDURE — 36600 WITHDRAWAL OF ARTERIAL BLOOD: CPT

## 2020-01-01 PROCEDURE — 02HV33Z INSERTION OF INFUSION DEVICE INTO SUPERIOR VENA CAVA, PERCUTANEOUS APPROACH: ICD-10-PCS | Performed by: INTERNAL MEDICINE

## 2020-01-01 PROCEDURE — 65270000029 HC RM PRIVATE

## 2020-01-01 PROCEDURE — XW13325 TRANSFUSION OF CONVALESCENT PLASMA (NONAUTOLOGOUS) INTO PERIPHERAL VEIN, PERCUTANEOUS APPROACH, NEW TECHNOLOGY GROUP 5: ICD-10-PCS | Performed by: FAMILY MEDICINE

## 2020-01-01 PROCEDURE — 80053 COMPREHEN METABOLIC PANEL: CPT

## 2020-01-01 PROCEDURE — 74011250637 HC RX REV CODE- 250/637: Performed by: EMERGENCY MEDICINE

## 2020-01-01 PROCEDURE — G8432 DEP SCR NOT DOC, RNG: HCPCS | Performed by: INTERNAL MEDICINE

## 2020-01-01 PROCEDURE — 96365 THER/PROPH/DIAG IV INF INIT: CPT

## 2020-01-01 PROCEDURE — 65610000006 HC RM INTENSIVE CARE

## 2020-01-01 PROCEDURE — C1751 CATH, INF, PER/CENT/MIDLINE: HCPCS

## 2020-01-01 PROCEDURE — 86140 C-REACTIVE PROTEIN: CPT

## 2020-01-01 PROCEDURE — G8427 DOCREV CUR MEDS BY ELIG CLIN: HCPCS | Performed by: INTERNAL MEDICINE

## 2020-01-01 PROCEDURE — 87086 URINE CULTURE/COLONY COUNT: CPT

## 2020-01-01 PROCEDURE — 2709999900 HC NON-CHARGEABLE SUPPLY

## 2020-01-01 PROCEDURE — 72125 CT NECK SPINE W/O DYE: CPT

## 2020-01-01 PROCEDURE — 85384 FIBRINOGEN ACTIVITY: CPT

## 2020-01-01 RX ORDER — ACETAMINOPHEN 650 MG/1
650 SUPPOSITORY RECTAL
Status: DISCONTINUED | OUTPATIENT
Start: 2020-01-01 | End: 2020-01-01 | Stop reason: SDUPTHER

## 2020-01-01 RX ORDER — LISINOPRIL 2.5 MG/1
2.5 TABLET ORAL DAILY
COMMUNITY

## 2020-01-01 RX ORDER — INSULIN GLARGINE 100 [IU]/ML
20 INJECTION, SOLUTION SUBCUTANEOUS DAILY
Status: DISCONTINUED | OUTPATIENT
Start: 2020-01-01 | End: 2020-01-01

## 2020-01-01 RX ORDER — SODIUM BICARBONATE 84 MG/ML
100 INJECTION, SOLUTION INTRAVENOUS
Status: COMPLETED | OUTPATIENT
Start: 2020-01-01 | End: 2020-01-01

## 2020-01-01 RX ORDER — ACETAMINOPHEN 325 MG/1
650 TABLET ORAL
Status: DISCONTINUED | OUTPATIENT
Start: 2020-01-01 | End: 2020-01-01 | Stop reason: HOSPADM

## 2020-01-01 RX ORDER — METFORMIN HYDROCHLORIDE 500 MG/1
TABLET, EXTENDED RELEASE ORAL
Qty: 180 TAB | Refills: 0 | Status: SHIPPED | OUTPATIENT
Start: 2020-01-01 | End: 2020-01-01

## 2020-01-01 RX ORDER — ALBUMIN HUMAN 50 G/1000ML
25 SOLUTION INTRAVENOUS ONCE
Status: COMPLETED | OUTPATIENT
Start: 2020-01-01 | End: 2020-01-01

## 2020-01-01 RX ORDER — CHLORHEXIDINE GLUCONATE 0.12 MG/ML
15 RINSE ORAL EVERY 12 HOURS
Status: DISCONTINUED | OUTPATIENT
Start: 2020-01-01 | End: 2020-01-01 | Stop reason: HOSPADM

## 2020-01-01 RX ORDER — SODIUM CHLORIDE 9 MG/ML
250 INJECTION, SOLUTION INTRAVENOUS AS NEEDED
Status: DISCONTINUED | OUTPATIENT
Start: 2020-01-01 | End: 2020-01-01 | Stop reason: HOSPADM

## 2020-01-01 RX ORDER — INSULIN LISPRO 100 [IU]/ML
INJECTION, SOLUTION INTRAVENOUS; SUBCUTANEOUS EVERY 6 HOURS
Status: DISCONTINUED | OUTPATIENT
Start: 2020-01-01 | End: 2020-01-01

## 2020-01-01 RX ORDER — ACETAMINOPHEN 325 MG/1
650 TABLET ORAL
Status: DISCONTINUED | OUTPATIENT
Start: 2020-01-01 | End: 2020-01-01 | Stop reason: SDUPTHER

## 2020-01-01 RX ORDER — INSULIN GLARGINE 100 [IU]/ML
0.2 INJECTION, SOLUTION SUBCUTANEOUS DAILY
Status: DISCONTINUED | OUTPATIENT
Start: 2020-01-01 | End: 2020-01-01

## 2020-01-01 RX ORDER — SODIUM CHLORIDE 9 MG/ML
75 INJECTION, SOLUTION INTRAVENOUS CONTINUOUS
Status: DISCONTINUED | OUTPATIENT
Start: 2020-01-01 | End: 2020-01-01

## 2020-01-01 RX ORDER — INSULIN LISPRO 100 [IU]/ML
12 INJECTION, SOLUTION INTRAVENOUS; SUBCUTANEOUS ONCE
Status: COMPLETED | OUTPATIENT
Start: 2020-01-01 | End: 2020-01-01

## 2020-01-01 RX ORDER — SILODOSIN 8 MG/1
8 CAPSULE ORAL
COMMUNITY

## 2020-01-01 RX ORDER — FENTANYL CITRATE 50 UG/ML
50-100 INJECTION, SOLUTION INTRAMUSCULAR; INTRAVENOUS
Status: DISCONTINUED | OUTPATIENT
Start: 2020-01-01 | End: 2020-01-01 | Stop reason: HOSPADM

## 2020-01-01 RX ORDER — POLYETHYLENE GLYCOL 3350 17 G/17G
17 POWDER, FOR SOLUTION ORAL DAILY PRN
Status: DISCONTINUED | OUTPATIENT
Start: 2020-01-01 | End: 2020-01-01

## 2020-01-01 RX ORDER — MAGNESIUM SULFATE 100 %
4 CRYSTALS MISCELLANEOUS AS NEEDED
Status: DISCONTINUED | OUTPATIENT
Start: 2020-01-01 | End: 2020-01-01 | Stop reason: SDUPTHER

## 2020-01-01 RX ORDER — ALBUTEROL SULFATE 0.83 MG/ML
10 SOLUTION RESPIRATORY (INHALATION) ONCE
Status: ACTIVE | OUTPATIENT
Start: 2020-01-01 | End: 2020-01-01

## 2020-01-01 RX ORDER — LISINOPRIL 2.5 MG/1
TABLET ORAL
Qty: 90 TAB | Refills: 1 | Status: SHIPPED | OUTPATIENT
Start: 2020-01-01 | End: 2020-01-01

## 2020-01-01 RX ORDER — SODIUM CHLORIDE 0.9 % (FLUSH) 0.9 %
5-40 SYRINGE (ML) INJECTION EVERY 8 HOURS
Status: DISCONTINUED | OUTPATIENT
Start: 2020-01-01 | End: 2020-01-01

## 2020-01-01 RX ORDER — INSULIN LISPRO 100 [IU]/ML
10 INJECTION, SOLUTION INTRAVENOUS; SUBCUTANEOUS ONCE
Status: COMPLETED | OUTPATIENT
Start: 2020-01-01 | End: 2020-01-01

## 2020-01-01 RX ORDER — ACETAMINOPHEN 500 MG
1000 TABLET ORAL ONCE
Status: COMPLETED | OUTPATIENT
Start: 2020-01-01 | End: 2020-01-01

## 2020-01-01 RX ORDER — LORAZEPAM 2 MG/ML
1 INJECTION INTRAMUSCULAR
Status: DISCONTINUED | OUTPATIENT
Start: 2020-01-01 | End: 2020-01-01 | Stop reason: HOSPADM

## 2020-01-01 RX ORDER — LORAZEPAM 2 MG/ML
INJECTION INTRAMUSCULAR
Status: COMPLETED
Start: 2020-01-01 | End: 2020-01-01

## 2020-01-01 RX ORDER — DEXAMETHASONE SODIUM PHOSPHATE 4 MG/ML
6 INJECTION, SOLUTION INTRA-ARTICULAR; INTRALESIONAL; INTRAMUSCULAR; INTRAVENOUS; SOFT TISSUE DAILY
Status: DISCONTINUED | OUTPATIENT
Start: 2020-01-01 | End: 2020-01-01

## 2020-01-01 RX ORDER — NOREPINEPHRINE BITARTRATE/D5W 8 MG/250ML
.5-8 PLASTIC BAG, INJECTION (ML) INTRAVENOUS
Status: DISCONTINUED | OUTPATIENT
Start: 2020-01-01 | End: 2020-01-01

## 2020-01-01 RX ORDER — ACETAMINOPHEN 650 MG/1
650 SUPPOSITORY RECTAL
Status: DISCONTINUED | OUTPATIENT
Start: 2020-01-01 | End: 2020-01-01 | Stop reason: HOSPADM

## 2020-01-01 RX ORDER — FINASTERIDE 5 MG/1
5 TABLET, FILM COATED ORAL DAILY
COMMUNITY

## 2020-01-01 RX ORDER — DEXMEDETOMIDINE HYDROCHLORIDE 4 UG/ML
.1-1.5 INJECTION, SOLUTION INTRAVENOUS
Status: DISCONTINUED | OUTPATIENT
Start: 2020-01-01 | End: 2020-01-01 | Stop reason: HOSPADM

## 2020-01-01 RX ORDER — ENOXAPARIN SODIUM 100 MG/ML
30 INJECTION SUBCUTANEOUS EVERY 24 HOURS
Status: DISCONTINUED | OUTPATIENT
Start: 2020-01-01 | End: 2020-01-01

## 2020-01-01 RX ORDER — ASCORBIC ACID 500 MG
500 TABLET ORAL 2 TIMES DAILY
Status: DISCONTINUED | OUTPATIENT
Start: 2020-01-01 | End: 2020-01-01

## 2020-01-01 RX ORDER — LISINOPRIL 2.5 MG/1
TABLET ORAL
Qty: 30 TAB | Refills: 0 | OUTPATIENT
Start: 2020-01-01

## 2020-01-01 RX ORDER — GLYCOPYRROLATE 0.2 MG/ML
0.2 INJECTION INTRAMUSCULAR; INTRAVENOUS
Status: DISCONTINUED | OUTPATIENT
Start: 2020-01-01 | End: 2020-01-01 | Stop reason: HOSPADM

## 2020-01-01 RX ORDER — ROCURONIUM BROMIDE 10 MG/ML
50 INJECTION, SOLUTION INTRAVENOUS
Status: COMPLETED | OUTPATIENT
Start: 2020-01-01 | End: 2020-01-01

## 2020-01-01 RX ORDER — INSULIN LISPRO 100 [IU]/ML
INJECTION, SOLUTION INTRAVENOUS; SUBCUTANEOUS EVERY 4 HOURS
Status: DISCONTINUED | OUTPATIENT
Start: 2020-01-01 | End: 2020-01-01

## 2020-01-01 RX ORDER — MIDAZOLAM IN 0.9 % SOD.CHLORID 1 MG/ML
0-10 PLASTIC BAG, INJECTION (ML) INTRAVENOUS
Status: DISCONTINUED | OUTPATIENT
Start: 2020-01-01 | End: 2020-01-01 | Stop reason: HOSPADM

## 2020-01-01 RX ORDER — DEXTROSE MONOHYDRATE 100 MG/ML
0-250 INJECTION, SOLUTION INTRAVENOUS AS NEEDED
Status: DISCONTINUED | OUTPATIENT
Start: 2020-01-01 | End: 2020-01-01 | Stop reason: SDUPTHER

## 2020-01-01 RX ORDER — SODIUM BICARBONATE 84 MG/ML
INJECTION, SOLUTION INTRAVENOUS
Status: COMPLETED
Start: 2020-01-01 | End: 2020-01-01

## 2020-01-01 RX ORDER — ONDANSETRON 2 MG/ML
4 INJECTION INTRAMUSCULAR; INTRAVENOUS
Status: DISCONTINUED | OUTPATIENT
Start: 2020-01-01 | End: 2020-01-01 | Stop reason: HOSPADM

## 2020-01-01 RX ORDER — MAGNESIUM SULFATE 100 %
4 CRYSTALS MISCELLANEOUS AS NEEDED
Status: DISCONTINUED | OUTPATIENT
Start: 2020-01-01 | End: 2020-01-01

## 2020-01-01 RX ORDER — HEPARIN SODIUM 10000 [USP'U]/100ML
10-25 INJECTION, SOLUTION INTRAVENOUS
Status: DISCONTINUED | OUTPATIENT
Start: 2020-01-01 | End: 2020-01-01

## 2020-01-01 RX ORDER — PROMETHAZINE HYDROCHLORIDE 25 MG/1
12.5 TABLET ORAL
Status: DISCONTINUED | OUTPATIENT
Start: 2020-01-01 | End: 2020-01-01 | Stop reason: HOSPADM

## 2020-01-01 RX ORDER — MIDAZOLAM HYDROCHLORIDE 1 MG/ML
4 INJECTION, SOLUTION INTRAMUSCULAR; INTRAVENOUS ONCE
Status: COMPLETED | OUTPATIENT
Start: 2020-01-01 | End: 2020-01-01

## 2020-01-01 RX ORDER — ZINC SULFATE 50(220)MG
1 CAPSULE ORAL DAILY
Status: DISCONTINUED | OUTPATIENT
Start: 2020-01-01 | End: 2020-01-01

## 2020-01-01 RX ORDER — FENTANYL CITRATE 50 UG/ML
50 INJECTION, SOLUTION INTRAMUSCULAR; INTRAVENOUS ONCE
Status: ACTIVE | OUTPATIENT
Start: 2020-01-01 | End: 2020-01-01

## 2020-01-01 RX ORDER — DEXTROSE MONOHYDRATE 100 MG/ML
125-250 INJECTION, SOLUTION INTRAVENOUS AS NEEDED
Status: DISCONTINUED | OUTPATIENT
Start: 2020-01-01 | End: 2020-01-01

## 2020-01-01 RX ORDER — FENTANYL CITRATE 50 UG/ML
INJECTION, SOLUTION INTRAMUSCULAR; INTRAVENOUS
Status: COMPLETED
Start: 2020-01-01 | End: 2020-01-01

## 2020-01-01 RX ORDER — DEXAMETHASONE SODIUM PHOSPHATE 4 MG/ML
4 INJECTION, SOLUTION INTRA-ARTICULAR; INTRALESIONAL; INTRAMUSCULAR; INTRAVENOUS; SOFT TISSUE EVERY 12 HOURS
Status: DISCONTINUED | OUTPATIENT
Start: 2020-01-01 | End: 2020-01-01

## 2020-01-01 RX ORDER — HYDROMORPHONE HYDROCHLORIDE 1 MG/ML
0.5 INJECTION, SOLUTION INTRAMUSCULAR; INTRAVENOUS; SUBCUTANEOUS
Status: DISCONTINUED | OUTPATIENT
Start: 2020-01-01 | End: 2020-01-01

## 2020-01-01 RX ORDER — METFORMIN HYDROCHLORIDE 500 MG/1
1000 TABLET, FILM COATED, EXTENDED RELEASE ORAL DAILY
COMMUNITY

## 2020-01-01 RX ORDER — PRAVASTATIN SODIUM 40 MG/1
40 TABLET ORAL
COMMUNITY

## 2020-01-01 RX ORDER — DEXTROSE MONOHYDRATE 100 MG/ML
250 INJECTION, SOLUTION INTRAVENOUS ONCE
Status: DISPENSED | OUTPATIENT
Start: 2020-01-01 | End: 2020-01-01

## 2020-01-01 RX ORDER — DEXTROSE MONOHYDRATE 100 MG/ML
0-250 INJECTION, SOLUTION INTRAVENOUS AS NEEDED
Status: DISCONTINUED | OUTPATIENT
Start: 2020-01-01 | End: 2020-01-01

## 2020-01-01 RX ORDER — SODIUM CHLORIDE 0.9 % (FLUSH) 0.9 %
5-40 SYRINGE (ML) INJECTION AS NEEDED
Status: DISCONTINUED | OUTPATIENT
Start: 2020-01-01 | End: 2020-01-01 | Stop reason: HOSPADM

## 2020-01-01 RX ORDER — LORAZEPAM 2 MG/ML
2 INJECTION INTRAMUSCULAR ONCE
Status: ACTIVE | OUTPATIENT
Start: 2020-01-01 | End: 2020-01-01

## 2020-01-01 RX ORDER — SODIUM BICARBONATE 84 MG/ML
150 INJECTION, SOLUTION INTRAVENOUS
Status: COMPLETED | OUTPATIENT
Start: 2020-01-01 | End: 2020-01-01

## 2020-01-01 RX ORDER — SODIUM BICARBONATE IN D5W 150/1000ML
PLASTIC BAG, INJECTION (ML) INTRAVENOUS CONTINUOUS
Status: DISCONTINUED | OUTPATIENT
Start: 2020-01-01 | End: 2020-01-01

## 2020-01-01 RX ORDER — SODIUM BICARBONATE 84 MG/ML
50 INJECTION, SOLUTION INTRAVENOUS ONCE
Status: COMPLETED | OUTPATIENT
Start: 2020-01-01 | End: 2020-01-01

## 2020-01-01 RX ADMIN — GLYCOPYRROLATE 0.2 MG: 0.2 INJECTION, SOLUTION INTRAMUSCULAR; INTRAVENOUS at 16:26

## 2020-01-01 RX ADMIN — Medication 200 MCG/HR: at 05:41

## 2020-01-01 RX ADMIN — Medication 55 MCG/MIN: at 01:39

## 2020-01-01 RX ADMIN — Medication 10 ML: at 22:53

## 2020-01-01 RX ADMIN — CISATRACURIUM BESYLATE 2 MCG/KG/MIN: 20 INJECTION INTRAVENOUS at 10:07

## 2020-01-01 RX ADMIN — CISATRACURIUM BESYLATE 3 MCG/KG/MIN: 20 INJECTION INTRAVENOUS at 23:34

## 2020-01-01 RX ADMIN — SODIUM BICARBONATE 150 MEQ/1,000 ML IN DEXTROSE 5 % INTRAVENOUS: SOLUTION at 21:47

## 2020-01-01 RX ADMIN — SODIUM CHLORIDE 1000 ML: 900 INJECTION, SOLUTION INTRAVENOUS at 08:40

## 2020-01-01 RX ADMIN — Medication 100 MCG/HR: at 22:04

## 2020-01-01 RX ADMIN — SODIUM BICARBONATE 50 MEQ: 84 INJECTION, SOLUTION INTRAVENOUS at 00:59

## 2020-01-01 RX ADMIN — SODIUM CHLORIDE 75 ML/HR: 900 INJECTION, SOLUTION INTRAVENOUS at 11:02

## 2020-01-01 RX ADMIN — INSULIN LISPRO 12 UNITS: 100 INJECTION, SOLUTION INTRAVENOUS; SUBCUTANEOUS at 04:25

## 2020-01-01 RX ADMIN — ACETAMINOPHEN 1000 MG: 500 TABLET ORAL at 08:40

## 2020-01-01 RX ADMIN — SODIUM BICARBONATE 100 MEQ: 84 INJECTION, SOLUTION INTRAVENOUS at 22:30

## 2020-01-01 RX ADMIN — REMDESIVIR 200 MG: 100 INJECTION, POWDER, LYOPHILIZED, FOR SOLUTION INTRAVENOUS at 00:48

## 2020-01-01 RX ADMIN — ZINC SULFATE 220 MG (50 MG) CAPSULE 1 CAPSULE: CAPSULE at 10:21

## 2020-01-01 RX ADMIN — MIDAZOLAM HYDROCHLORIDE 6 MG/HR: 5 INJECTION, SOLUTION INTRAMUSCULAR; INTRAVENOUS at 13:12

## 2020-01-01 RX ADMIN — FENTANYL CITRATE 50 MCG: 50 INJECTION, SOLUTION INTRAMUSCULAR; INTRAVENOUS at 18:29

## 2020-01-01 RX ADMIN — HUMAN INSULIN 10 UNITS: 100 INJECTION, SOLUTION SUBCUTANEOUS at 02:00

## 2020-01-01 RX ADMIN — LORAZEPAM 2 MG: 2 INJECTION INTRAMUSCULAR; INTRAVENOUS at 18:28

## 2020-01-01 RX ADMIN — SODIUM BICARBONATE 50 MEQ: 84 INJECTION, SOLUTION INTRAVENOUS at 20:25

## 2020-01-01 RX ADMIN — SODIUM BICARBONATE 150 MEQ/1,000 ML IN DEXTROSE 5 % INTRAVENOUS: SOLUTION at 05:40

## 2020-01-01 RX ADMIN — INSULIN LISPRO 10 UNITS: 100 INJECTION, SOLUTION INTRAVENOUS; SUBCUTANEOUS at 10:11

## 2020-01-01 RX ADMIN — Medication 10 ML: at 13:11

## 2020-01-01 RX ADMIN — DEXMEDETOMIDINE HYDROCHLORIDE 1.5 MCG/KG/HR: 400 INJECTION INTRAVENOUS at 03:20

## 2020-01-01 RX ADMIN — DEXMEDETOMIDINE HYDROCHLORIDE 1.5 MCG/KG/HR: 400 INJECTION INTRAVENOUS at 00:39

## 2020-01-01 RX ADMIN — Medication 200 MCG/HR: at 13:36

## 2020-01-01 RX ADMIN — Medication 10 ML: at 05:26

## 2020-01-01 RX ADMIN — DEXAMETHASONE SODIUM PHOSPHATE 6 MG: 4 INJECTION, SOLUTION INTRAMUSCULAR; INTRAVENOUS at 08:36

## 2020-01-01 RX ADMIN — DEXMEDETOMIDINE HYDROCHLORIDE 1.5 MCG/KG/HR: 400 INJECTION INTRAVENOUS at 09:08

## 2020-01-01 RX ADMIN — FAMOTIDINE 20 MG: 10 INJECTION, SOLUTION INTRAVENOUS at 13:11

## 2020-01-01 RX ADMIN — OXYCODONE HYDROCHLORIDE AND ACETAMINOPHEN 500 MG: 500 TABLET ORAL at 10:21

## 2020-01-01 RX ADMIN — MIDAZOLAM 4 MG: 1 INJECTION INTRAMUSCULAR; INTRAVENOUS at 23:20

## 2020-01-01 RX ADMIN — INSULIN LISPRO 12 UNITS: 100 INJECTION, SOLUTION INTRAVENOUS; SUBCUTANEOUS at 13:53

## 2020-01-01 RX ADMIN — PIPERACILLIN AND TAZOBACTAM 3.38 G: 3; .375 INJECTION, POWDER, LYOPHILIZED, FOR SOLUTION INTRAVENOUS at 09:30

## 2020-01-01 RX ADMIN — Medication 10 ML: at 05:40

## 2020-01-01 RX ADMIN — PIPERACILLIN AND TAZOBACTAM 3.38 G: 3; .375 INJECTION, POWDER, LYOPHILIZED, FOR SOLUTION INTRAVENOUS at 03:02

## 2020-01-01 RX ADMIN — VASOPRESSIN 0.04 UNITS/MIN: 20 INJECTION INTRAVENOUS at 03:04

## 2020-01-01 RX ADMIN — CEFEPIME HYDROCHLORIDE 1 G: 1 INJECTION, POWDER, FOR SOLUTION INTRAMUSCULAR; INTRAVENOUS at 09:32

## 2020-01-01 RX ADMIN — Medication 20 MCG/MIN: at 19:45

## 2020-01-01 RX ADMIN — ACETAMINOPHEN 650 MG: 325 TABLET ORAL at 21:50

## 2020-01-01 RX ADMIN — CHLORHEXIDINE GLUCONATE 15 ML: 0.12 RINSE ORAL at 12:14

## 2020-01-01 RX ADMIN — VASOPRESSIN 0.04 UNITS/MIN: 20 INJECTION INTRAVENOUS at 12:14

## 2020-01-01 RX ADMIN — DEXMEDETOMIDINE HYDROCHLORIDE 1.5 MCG/KG/HR: 400 INJECTION INTRAVENOUS at 06:15

## 2020-01-01 RX ADMIN — Medication 55 MCG/MIN: at 23:15

## 2020-01-01 RX ADMIN — CALCIUM GLUCONATE 1 G: 98 INJECTION, SOLUTION INTRAVENOUS at 00:43

## 2020-01-01 RX ADMIN — SODIUM BICARBONATE: 84 INJECTION, SOLUTION INTRAVENOUS at 13:13

## 2020-01-01 RX ADMIN — DEXMEDETOMIDINE HYDROCHLORIDE 1.5 MCG/KG/HR: 400 INJECTION INTRAVENOUS at 21:48

## 2020-01-01 RX ADMIN — CEFEPIME 2 G: 2 INJECTION, POWDER, FOR SOLUTION INTRAVENOUS at 17:00

## 2020-01-01 RX ADMIN — Medication 6 MCG/MIN: at 11:29

## 2020-01-01 RX ADMIN — LORAZEPAM 1 MG: 2 INJECTION INTRAMUSCULAR; INTRAVENOUS at 16:26

## 2020-01-01 RX ADMIN — VASOPRESSIN 0.04 UNITS/MIN: 20 INJECTION INTRAVENOUS at 20:00

## 2020-01-01 RX ADMIN — Medication 10 ML: at 19:02

## 2020-01-01 RX ADMIN — CEFEPIME HYDROCHLORIDE 1 G: 1 INJECTION, POWDER, FOR SOLUTION INTRAMUSCULAR; INTRAVENOUS at 19:00

## 2020-01-01 RX ADMIN — ENOXAPARIN SODIUM 30 MG: 30 INJECTION SUBCUTANEOUS at 21:48

## 2020-01-01 RX ADMIN — Medication 28 MCG/MIN: at 04:35

## 2020-01-01 RX ADMIN — Medication 10 ML: at 21:49

## 2020-01-01 RX ADMIN — FENTANYL CITRATE 100 MCG: 50 INJECTION, SOLUTION INTRAMUSCULAR; INTRAVENOUS at 23:22

## 2020-01-01 RX ADMIN — DEXMEDETOMIDINE HYDROCHLORIDE 0.4 MCG/KG/HR: 400 INJECTION INTRAVENOUS at 19:13

## 2020-01-01 RX ADMIN — DEXMEDETOMIDINE HYDROCHLORIDE 1.5 MCG/KG/HR: 400 INJECTION INTRAVENOUS at 14:42

## 2020-01-01 RX ADMIN — HEPARIN SODIUM AND DEXTROSE 10 UNITS/KG/HR: 10000; 5 INJECTION INTRAVENOUS at 01:36

## 2020-01-01 RX ADMIN — DEXAMETHASONE SODIUM PHOSPHATE 4 MG: 4 INJECTION, SOLUTION INTRA-ARTICULAR; INTRALESIONAL; INTRAMUSCULAR; INTRAVENOUS; SOFT TISSUE at 05:26

## 2020-01-01 RX ADMIN — DEXAMETHASONE SODIUM PHOSPHATE 4 MG: 4 INJECTION, SOLUTION INTRA-ARTICULAR; INTRALESIONAL; INTRAMUSCULAR; INTRAVENOUS; SOFT TISSUE at 19:01

## 2020-01-01 RX ADMIN — DEXMEDETOMIDINE HYDROCHLORIDE 1.5 MCG/KG/HR: 400 INJECTION INTRAVENOUS at 11:23

## 2020-01-01 RX ADMIN — SODIUM CHLORIDE, SODIUM LACTATE, POTASSIUM CHLORIDE, AND CALCIUM CHLORIDE 1000 ML: 600; 310; 30; 20 INJECTION, SOLUTION INTRAVENOUS at 20:31

## 2020-01-01 RX ADMIN — ROCURONIUM BROMIDE 50 MG: 10 INJECTION INTRAVENOUS at 23:19

## 2020-01-01 RX ADMIN — OXYCODONE HYDROCHLORIDE AND ACETAMINOPHEN 500 MG: 500 TABLET ORAL at 17:25

## 2020-01-01 RX ADMIN — SODIUM BICARBONATE 100 MEQ: 84 INJECTION, SOLUTION INTRAVENOUS at 20:05

## 2020-01-01 RX ADMIN — ALBUMIN (HUMAN) 25 G: 12.5 INJECTION, SOLUTION INTRAVENOUS at 02:04

## 2020-01-01 RX ADMIN — MIDAZOLAM HYDROCHLORIDE 2 MG/HR: 5 INJECTION, SOLUTION INTRAMUSCULAR; INTRAVENOUS at 22:15
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
## 2020-11-01 PROBLEM — J18.9 PNEUMONIA: Status: ACTIVE | Noted: 2020-01-01

## 2020-11-01 NOTE — PROGRESS NOTES
Day #1 of Cefepime Indication:   CAP Current regimen:  2G IV q12h Abx regimen: Cefepime Recent Labs 20 
7244 WBC 6.2 CREA 2.52* BUN 51* Est CrCl: 26 ml/min; UO: -- ml/kg/hr Temp (24hrs), Av.3 °F (37.4 °C), Min:97.8 °F (36.6 °C), Max:102.2 °F (39 °C) Cultures: pending Plan: Change to 2G IV q24h per [address] [place] [address] P&T Committee Protocol with respect to renal function. Pharmacy will continue to monitor patient daily and will make dosage adjustments based upon changing renal function. Patient received 1G today at  0932 so will order another 1G now and start regimen 24 hours from now.

## 2020-11-01 NOTE — PROGRESS NOTES
Problem: General Medical Care Plan Goal: *Vital signs within specified parameters Outcome: Progressing Towards Goal 
Goal: *Skin integrity maintained Outcome: Progressing Towards Goal 
Goal: *Optimize nutritional status Outcome: Progressing Towards Goal 
  
Problem: Falls - Risk of 
Goal: *Absence of Falls Description: Document Octavio Going Fall Risk and appropriate interventions in the flowsheet. Outcome: Progressing Towards Goal 
Note: Fall Risk Interventions: 
  
Bed alarm Non-slip footwear Video-monitoring for safety Call light within reach Bed in the lowest position Room (only one available) is close to the nurses station (211)

## 2020-11-01 NOTE — ED PROVIDER NOTES
The history is provided by the patient and the EMS personnel. The history is limited by the condition of the patient. No  was used. Fall The accident occurred less than 1 hour ago. The fall occurred in unknown circumstances. He fell from a height of ground level. He landed on carpet. There was no blood loss. Past Medical History:  
Diagnosis Date  Advance directive discussed with patient 05/13/2015,05/25/2016  Anemia 6/16/2009  Anemia NEC  Arthritis 6/16/2009  BPH (benign prostatic hyperplasia)  CAD (coronary artery disease)  CAD (coronary artery disease) of artery bypass graft 6/16/2009  
 dr faye Ku or associate Dr Mckay Or  Carotid stenosis  Chronic pain   
 back  Concussion syndrome  DM (diabetes mellitus) ([facility] [address]  Edema  Gait disturbance  Glaucoma suspect 12/04/14 VEI notes Rolm Carlee  Headache due to trauma 1/2012  
 hit by a device on a door  Headache(784.0)  Hearing difficulty  Hypercholesterolemia  Hypertension 6/16/2009  Kidney stone 7/10  
 dr [facility] styles [facility]ri[facility]  Microalbuminuria 2/2013  Mild memory disturbance Sofie Lau also  
 neuro associates Alisia Andres  Nasal septal deviation  Pain in back 6/16/2009  Screening for diabetic retinopathy 7/18/16  
 exam neg for retinopathy Stiven'/Kaushik  Sleep apnea  Vitamin D deficiency Past Surgical History:  
Procedure Laterality Date  CARDIAC SURG PROCEDURE UNLIST    
 stent 2008  ENDOSCOPY, COLON, DIAGNOSTIC    
 in [place]-  thinks in 2006  HX BACK SURGERY    
 HX CORONARY STENT PLACEMENT    
 HX HEART CATHETERIZATION    
 HX HEENT    
 left cataract  HX ORTHOPAEDIC    
 back multiple times  HX OTHER SURGICAL  12/2012  
 temporal  artery bx  HX OTHER SURGICAL  4/23/14  
 lower ext doppler  HX OTHER SURGICAL  4/1/16  
 echo report EF 60%  HX PTCA Family History: Problem Relation Age of Onset  Heart Disease Mother  Heart Disease Father  Cancer Sister  Dementia Sister  Cancer Brother   
     leukemia  Arthritis-osteo Brother  Migraines Brother  Migraines Child  Arthritis-osteo Child Social History Socioeconomic History  Marital status:  Spouse name: Not on file  Number of children: Not on file  Years of education: Not on file  Highest education level: Not on file Occupational History  Not on file Social Needs  Financial resource strain: Not on file  Food insecurity Worry: Not on file Inability: Not on file  Transportation needs Medical: Not on file Non-medical: Not on file Tobacco Use  Smoking status: Former Smoker Packs/day: 0.50 Years: 30.00 Pack years: 15.00 Types: Cigarettes Last attempt to quit: 2014 Years since quittin.3  Smokeless tobacco: Never Used Substance and Sexual Activity  Alcohol use: No  
  Comment: occassionally  Drug use: No  
 Sexual activity: Yes  
  Partners: Female Birth control/protection: None Lifestyle  Physical activity Days per week: Not on file Minutes per session: Not on file  Stress: Not on file Relationships  Social connections Talks on phone: Not on file Gets together: Not on file Attends [employer] service: Not on file Active member of club or organization: Not on file Attends meetings of clubs or organizations: Not on file Relationship status: Not on file  Intimate partner violence Fear of current or ex partner: Not on file Emotionally abused: Not on file Physically abused: Not on file Forced sexual activity: Not on file Other Topics Concern  Not on file Social History Narrative  Not on file ALLERGIES: Patient has no known allergies. Review of Systems Unable to perform ROS: Dementia Vitals: 11/01/20 0477 BP: 103/65 Pulse: (!) 118 Resp: 24 Temp: (!) 102.2 °F (39 °C) SpO2: 96% Weight: 98.6 kg (217 lb 6 oz) Physical Exam 
Vitals signs and nursing note reviewed. Constitutional:   
   General: He is not in acute distress. Appearance: He is well-developed. He is ill-appearing. He is not diaphoretic. HENT:  
   Head: Normocephalic and atraumatic. Eyes:  
   Pupils: Pupils are equal, round, and reactive to light. Neck: Musculoskeletal: Normal range of motion and neck supple. Cardiovascular:  
   Rate and Rhythm: Regular rhythm. Tachycardia present. Heart sounds: Normal heart sounds. No murmur. No friction rub. No gallop. Pulmonary:  
   Effort: Pulmonary effort is normal. No respiratory distress. Breath sounds: Normal breath sounds. No wheezing. Abdominal:  
   General: Bowel sounds are normal. There is no distension. Palpations: Abdomen is soft. Tenderness: There is no abdominal tenderness. There is no guarding or rebound. Musculoskeletal:  
   Right hip: He exhibits decreased range of motion and decreased strength. Skin: 
   General: Skin is warm. Findings: No rash. Neurological:  
   Mental Status: He is alert. MDM Number of Diagnoses or Management Options Metabolic encephalopathy:  
Severe sepsis ([facility] [address]): Amount and/or Complexity of Data Reviewed Clinical lab tests: reviewed Tests in the radiology section of CPT®: reviewed Tests in the medicine section of CPT®: reviewed This is an 27-year-old male with past medical history, review of systems, physical exam as above, presenting with complaints of repeated falls, tachycardia and fever. Per EMS, they were called as the patient has had 2 falls out of bed tonight. Reported to them were fevers, unclear whether subjective or recorded.   Patient with a history of advanced dementia, noted to be a poor historian upon arrival.  Exam otherwise remarkable for well-appearing elderly male, tachycardic, normotensive, febrile, mildly tachypneic and satting well on room air. He has tenderness to palpation over the right hip, with skin tears over bilateral elbows, clear breath sounds, soft abdomen, rapid regular heart rate without murmurs gallops or rubs he appears to be incontinent of urine. Differential is extensive, including electrolyte abnormality, urosepsis, URI. Plan to obtain CMP, CBC, UA, lactic acid and blood cultures, chest x-ray, head CT, plain films of the right hip. We will reassess, and make a disposition. Procedures BESIDE SIGN OUT: 
7:16 AM 
Discussed pt's hx, disposition, and available diagnostic and imaging results with Dr. Ruben Carlos. Reviewed care plans. Both providers and patient are in agreement with care plan. Dr. Abiel Oviedo is transferring care of the pt to Dr. Ruben Carlos at this time. IMPRESSION: 
1. Severe sepsis ([facility] [address] 2. Metabolic encephalopathy - [facility] Antibiotics ordered: Cefepime - Repeat lactic acid ordered for time 0934  
- Re-assessment performed at time 10:00 and clinical condition stable. - Hypotension or Lactic Acidosis present (SBP<90, MAP<65, Lactate >4): NO IVF:  Not indicated due to Hypotension not present - Persistent Hypotension despite IVF resuscitation: NO  Vasopressors: Not indicated due to Septic Shock not present Plan: 
Admit to Step down Total critical care time spent exclusive of procedures:  36 minutes Cullen Heller MD 
 
Perfect Serve Consult for Admission 10:06 AM 
 
ED Room Number: SB17/42 Patient Name and age: Rosanna Hayden 80 y.o.  male Working Diagnosis: 1. Severe sepsis ([address] 2. Metabolic encephalopathy COVID-19 Suspicion:  no 
Sepsis present:  yes  Reassessment needed: yes Code Status:  Full Code Readmission: no 
Isolation Requirements:  no 
Recommended Level of Care:  telemetry Department:[facility] Adult ED - [phone] Other:  Dementia. From home with fever and tachycardia. Likely UTI. VS improved with ivf and tylenol. Given ceftriaxone in ED. Lactate 2.2.

## 2020-11-01 NOTE — H&P
[address] [facility] Adult  Hospitalist Group History and Physical 
 
Primary Care Provider: Hector Wisdom MD 
Date of Service:  11/1/2020 Subjective: Jillian Stern is a 80 y.o. male who presents to the emergency room by EMS after falling at home. At the time of my evaluation, family not present at bedside and patient has dementia and cannot be communicated. Tried to call patient's wife listed on the emergency contact I cannot reach her. History was obtained from gathering information from ER records. It appears that patient has been falling twice at home trying to get out of bed last night. Fever reported by family, unclear if the fever was recorded. Upon evaluation in the ER patient was tachycardic and febrile. Pelvic x-ray and the right hip x-ray demonstrated no acute abnormality. CT head and CT C-spine shows no acute pathology. Chest x-ray shows diffuse interstitial and airspace opacities suspicious for viral pneumonia. Patient has been saturating well on room air. Tachycardia has improved at the time of my evaluation. Review of Systems: 
 
Review of system cannot be obtained due to patient's demented status. Past Medical History:  
Diagnosis Date  Advance directive discussed with patient 05/13/2015,05/25/2016  Anemia 6/16/2009  Anemia NEC  Arthritis 6/16/2009  BPH (benign prostatic hyperplasia)  CAD (coronary artery disease)  CAD (coronary artery disease) of artery bypass graft 6/16/2009  
 dr faye Gaffney or associate Dr Flaquito Dave  Carotid stenosis  Chronic pain   
 back  Concussion syndrome  DM (diabetes mellitus) ([facility] [address]  Edema  Gait disturbance  Glaucoma suspect 12/04/14 VEI notes Leah Lusher  Headache due to trauma 1/2012  
 hit by a device on a door  Headache(784.0)  Hearing difficulty  Hypercholesterolemia  Hypertension 6/16/2009  Kidney stone 7/10  
 dr Jose Guadalupe [facility]  Microalbuminuria 2/2013  Mild memory disturbance Ken Pickett also  
 neuro associates Alisia Andres  Nasal septal deviation  Pain in back 6/16/2009  Screening for diabetic retinopathy 7/18/16  
 exam neg for retinopathy Stiven'/Faulkner  Sleep apnea  Vitamin D deficiency Past Surgical History:  
Procedure Laterality Date  CARDIAC SURG PROCEDURE UNLIST    
 stent 2008  ENDOSCOPY, COLON, DIAGNOSTIC    
 in [facility] pt thinks in 2006  HX BACK SURGERY    
 HX CORONARY STENT PLACEMENT    
 HX HEART CATHETERIZATION    
 HX HEENT    
 left cataract  HX ORTHOPAEDIC    
 back multiple times  HX OTHER SURGICAL  12/2012  
 temporal  artery bx  HX OTHER SURGICAL  4/23/14  
 lower ext doppler  HX OTHER SURGICAL  4/1/16  
 echo report EF 60%  HX PTCA Prior to Admission medications Not on File No Known Allergies Family History Problem Relation Age of Onset  Heart Disease Mother  Heart Disease Father  Cancer Sister  Dementia Sister  Cancer Brother   
     leukemia  Arthritis-osteo Brother  Migraines Brother  Migraines Child  Arthritis-osteo Child SOCIAL HISTORY: 
Patient resides at Home. Patient ambulates with . Smoking history: Former smoker Alcohol history: Unknown FAMILY HISTORY: 
Heart disease Cancer Dementia Arthritis Migraine Objective:  
 
 
Physical Exam:  
Visit Vitals BP (!) 126/37 Pulse 91 Temp 98 °F (36.7 °C) Resp 21 Wt 98.6 kg (217 lb 6 oz) SpO2 (!) 82% BMI 29.48 kg/m² General:  Alert, no distress, appears stated age. Head:  Normocephalic, without obvious abnormality, atraumatic. Eyes:  Conjunctivae/corneas clear. PERRL, EOMs intact. Fundi benign Ears:  Normal TMs and external ear canals both ears. Nose: Nares normal. Septum midline. Mucosa normal. No drainage or sinus tenderness.   
Throat: Lips, mucosa, and tongue normal. Teeth and gums normal.  
 Neck: Supple, symmetrical, trachea midline, no adenopathy, thyroid: no enlargement/tenderness/nodules, no carotid bruit and no JVD. Back:   Symmetric, no curvature. ROM normal. No CVA tenderness. Lungs:   Clear to auscultation bilaterally. Chest wall:  No tenderness or deformity. Heart:  Regular rate and rhythm, S1, S2 normal, no murmur, click, rub or gallop. Abdomen:   Soft, non-tender. Bowel sounds normal. No masses,  No organomegaly. Genitalia:  Deferred Rectal:  Deferred Extremities: Extremities normal, atraumatic, no cyanosis or edema. Pulses: 2+ and symmetric all extremities. Skin: Skin color, texture, turgor normal. No rashes or lesions Lymph nodes: Cervical, supraclavicular, and axillary nodes normal.  
Neurologic: CNII-XII intact. Normal strength, sensation and reflexes throughout. Cap refill: Brisk, less than 3 seconds Pulses: 2+, symmetric in all extremities Data Review: All diagnostic labs and studies have been reviewed. Chest x-ray Diffuse interstitial and airspace opacities suspicious for viral pneumonia. [facility] CT head shows no apparent acute intracranial finding. Chronic ischemic findings. CT of the spine shows no fracture. Pelvic and right hip x-ray shows no acute abnormality. Assessment and Plan:  
 
Sepsis 
-Patient with fever, tachycardia and abnormal lactic acid level 
-Sepsis secondary to pneumonia 
-Sepsis reassessment completed Pneumonia 
-Diffuse interstitial and airspace opacities on chest x-ray suspicious for viral pneumonia 
-Currently not hypoxic 
-Check blood cultures, check for COVID 19, check Legionella 
-Empiric coverage with cefepime and Decadron 
-Start vitamin C and zinc 
-If COVID positive, consider ID consult Abnormal renal function 
-No recent labs to compare renal function 
-Gentle IV fluid hydration and monitor renal function 
-If renal function does not improve or worse, may need further work-up with renal ultrasound and nephrology consult 
-Monitor intake and output Hypertension 
-No home medication list available at this time 
-Blood pressure is stable Diabetes 
-No home medication list available at this time 
-Insulin sliding scale coverage DVT prophylaxis 
-Lovenox and SCDs Estimated length of stay is 2 midnights. FUNCTIONAL STATUS PRIOR TO HOSPITALIZATION is unclear (including history of recent falls):  
 
Signed By: Saintclair Mussel, MD   
 November 1, 2020

## 2020-11-01 NOTE — ED TRIAGE NOTES
Pt arrives from home via EMS w/ [facility]. EMS states pt fell out of bed twice tonight, family called 911 after the 2nd fall. Falls were unwitnessed, unknown If hit head or LOC. Hx dementia, pt oriented to self but disoriented to time, place and situation. This is pt's baseline, according to pt's family and EMS. Per EMS, temp 99.9 orally.  Pt tachy and tachypneic upon arrival. /65 upon arrival.

## 2020-11-01 NOTE — PROGRESS NOTES
TRANSFER - IN REPORT: 
 
Verbal report received from MARTHA ASH(name) on [employer]  being received from ED(unit) for routine progression of care Report consisted of patients Situation, Background, Assessment and  
Recommendations(SBAR). Information from the following report(s) SBAR, Kardex, Intake/Output, MAR, Recent Results and Med Rec Status was reviewed with the receiving nurse. Opportunity for questions and clarification was provided. Assessment completed upon patients arrival to unit and care assumed. Room close to the nurses station is currently occupied but being discharged. Will place a stat clean on this room when available.

## 2020-11-01 NOTE — ED NOTES
Pt restless, moving around in bed and pulling at cords. Unable to place IV at this time, need more people to help hold him still.

## 2020-11-01 NOTE — ED NOTES
Verbal shift change report given to [facility]. (oncoming nurse) by Wisam Orellana (offgoing nurse). Report included the following information SBAR, Kardex, ED Summary, [facility] and Recent Results.

## 2020-11-01 NOTE — ED NOTES
Verbal shift change report given to [address] [place] [address] [address] (oncoming nurse) by Rajani Huerta RN (offgoing nurse). Report included the following information SBAR, Kardex, ED Summary, [facility] and Recent Results.

## 2020-11-02 NOTE — CONSULTS
Pulmonary Due to COVID-19 pandemic and in efforts to decrease risk of spread and potential spread of virus as well as to conserve personal protective equipment, direct patient contact is being limited where clinically appropriate. Chart reviewed. All pertinent images, lab studies, and medical testing have been reviewed (images independently viewed). Pulmonary recommendations are being made to the primary medical team. 
 
Pulmonary Impression:       
COVID 19 pneumonia with patchy interstitial infiltrates and elevated inflammatory markers. Currently on RA Falls without acute fx's Sepsis syndrome with normal lactic acid and normal WBC (Viral) Renal insufficiency Pulmonary Recommendations:  
--continue to follow clinically and monitor oxygenation 
--follow inflammatory markers - added IL6 
--ID to see for remdesivir 
--no indication for Meir at this time History:          
81 yo male presented to ER on 20 after a fall at home. +fever he was tachycardic and febrile on arrival at the ER 
CXR - patchy interstital opacities c/w viral pneumonia Head and neck ct without acute process Hip films without fracture COVID testing positive On RA Has been started on cefepime, decadron, lovenox prophylaxis, vitamin C and zinc. 
ID has been consulted for consideration of Remdesivir Vital Signs:      
Patient Vitals for the past 4 hrs: 
 BP Temp Pulse Resp SpO2  
20 0824 117/62 97.8 °F (36.6 °C) (!) 106 18 94 % Temp (24hrs), Av.8 °F (37.1 °C), Min:97.4 °F (36.3 °C), Max:101.2 °F (38.4 °C) CVP:       
No intake or output data in the 24 hours ending 20 0934 Blood Sugar: 
Lab Results Component Value Date/Time Glucose (POC) 119 (H) 2019 07:43 AM  
 Glucose (POC) 124 (H) 2019 09:15 PM  
 Glucose (POC) 110 (H) 2019 05:37 PM  
 Glucose (POC) 101 (H) 2019 12:11 PM  
 Glucose (POC) 124 (H) 2019 07:02 AM  
 
 
Physical Exam: Deferred Data Review:     
Radiology images independently viewed CXR - diffuse interstitial opacities Labs: 
Lab: 
Recent Labs 11/01/20 
3979 11/01/20 
6566 WBC  --  6.2 HGB  --  11.7*  
PLT  --  183 NA  --  138 K  --  4.3 CL  --  106 CO2  --  24 BUN  --  51* CREA  --  2.52* GLU  --  176* CA  --  8.6 TBILI  --  0.5 LAC 1.4 2.2* WBC 6.2.  1000 L 
D dimer - 1.19 CRP 18.60 Procalcitonin 0.22  Lactic acid 1.4 SARS COV 2 - Positive ABG: 
No results for input(s): PHI, PCO2I, PO2I, HCO3I, SO2I, FIO2I in the last 72 hours.  
Microbiology: 
 
  
Citlalli Crowell MD

## 2020-11-02 NOTE — PROGRESS NOTES
Admission Medication Reconciliation: 
 
Information obtained from:  rx query, unable to reach patient RxQuery data available¹:  YES Comments/Recommendations: Updated PTA meds/reviewed patient's allergies. 1)  Patient did not have any medications on their PTA med list prior to this review. Utilized RxQuery for initial assessment. Attempted to contact patient, but have been unable to reach them in their room. Will leave the review open and continue to attempt to contact patient. Unsure if patient is taking the below medications as they have varying refill history. ¹RxQuery pharmacy benefit data reflects medications filled and processed through the patient's insurance, however  
this data does NOT capture whether the medication was picked up or is currently being taken by the patient. Allergies:  Patient has no known allergies. Significant PMH/Disease States:  
Past Medical History:  
Diagnosis Date Advance directive discussed with patient 05/13/2015,05/25/2016 Anemia 6/16/2009 Anemia NEC Arthritis 6/16/2009 BPH (benign prostatic hyperplasia) CAD (coronary artery disease) CAD (coronary artery disease) of artery bypass graft 6/16/2009  
 dr faye Willams or associate Dr Ogden Chimera Carotid stenosis Chronic pain   
 back Concussion syndrome DM (diabetes mellitus) ([facility] [address] Edema Gait disturbance Glaucoma suspect 12/04/14 VEI notes Verneta Holts Headache due to trauma 1/2012  
 hit by a device on a door Headache(784.0) Hearing difficulty Hypercholesterolemia Hypertension 6/16/2009 Kidney stone 7/10  
 dr Bet[facility] Microalbuminuria 2/2013 Mild memory disturbance Akshat Bench also  
 neuro associates Harper Rater Nasal septal deviation Pain in back 6/16/2009 Screening for diabetic retinopathy 7/18/16  
 exam neg for retinopathy Stiven'/Faulkner Sleep apnea Vitamin D deficiency Chief Complaint for this Admission: Chief Complaint Patient presents with Fall Prior to Admission Medications:  
Prior to Admission Medications Prescriptions Last Dose Informant Taking?  
finasteride (PROSCAR) 5 mg tablet   No  
Sig: Take 5 mg by mouth daily. lisinopriL (PRINIVIL, ZESTRIL) 2.5 mg tablet   No  
Sig: Take 2.5 mg by mouth daily. metFORMIN (GLUMETZA ER) 500 mg TG24 24 hour tablet   No  
Sig: Take 1,000 mg by mouth daily. pravastatin (PravachoL) 40 mg tablet   No  
Sig: Take 40 mg by mouth nightly. silodosin (RAPAFLO) 8 mg capsule   No  
Sig: Take 8 mg by mouth daily (with breakfast). Facility-Administered Medications: None Please contact the main inpatient pharmacy with any questions or concerns at [phone] and we will direct you to the clinical pharmacist covering this patient's care while in-house. Rut Gregory

## 2020-11-02 NOTE — PROGRESS NOTES
Bedside shift change report given to Kip Ferrer RN and Jacqui Carter RN (oncoming nurse) by Antwon Britton RN (offgoing nurse). Report included the following information SBAR, Kardex, MAR and Recent Results.

## 2020-11-02 NOTE — PROGRESS NOTES
Critical care note: Responded to [facility] 
Arrived at bedside, team performing chest compression for less than 1 minute. Pulses present. Chest compression terminated. Did not receive any medication. Patient obtunded, intubated. No medication needed. Blood pressure 90/60 heart rate 87, could not detect pulse oximetry reading. Patient obtunded moves bilateral upper limb nonpurposefully. Does not open eyes to painful stimuli. Ordered complete set of lab. Ordered ABG. Order chest x-ray: Seen by myself, adequate ET tube position, severe bilateral infiltrate more on the right side. Few minutes later had to give him 2 mg Ativan and 50 mcg fentanyl as he started to move 4 limbs, not following command, not purposeful but interfering with ventilation and nurses obtaining lab work. Patient is going to ICU room 2. I called the attending on the seventh floor notified him of this event. I talked with his daughter over the phone. Confirmed full CODE STATUS. Discussed with her this recent event.

## 2020-11-02 NOTE — PROGRESS NOTES
Spiritual Care Assessment/Progress Note [address] 
 
 
NAME: Kyaw Ortega      MRN: [id number] AGE: 80 y.o. SEX: male Orthodoxy Affiliation: [employer]eterian  
Language: English  
 
11/2/2020     Total Time (in minutes): 17 Spiritual Assessment begun in [facility] through conversation with: 
  
    [x]Patient        [] Family    [] Friend(s) Reason for Consult: Code Blue/Joy Spiritual beliefs: (Please include comment if needed) 
   [] Identifies with a jim tradition:     
   [] Supported by a jim community:        
   [] Claims no spiritual orientation:       
   [] Seeking spiritual identity:            
   [] Adheres to an individual form of spirituality:       
   [x] Not able to assess:                   
 
    
Identified resources for coping:  
   [] Prayer                           
   [] Music                  [] Guided Imagery 
   [] Family/friends                 [] Pet visits [] Devotional reading                         [x] Unknown 
   [] Other:                                          
 
 
Interventions offered during this visit: (See comments for more details) Patient Interventions: Crisis Plan of Care: 
 
 [x] Support spiritual and/or cultural needs  
 [] Support AMD and/or advance care planning process    
 [] Support grieving process 
 [] Coordinate Rites and/or Rituals  
 [] Coordination with community clergy [] No spiritual needs identified at this time 
 [] Detailed Plan of Care below (See Comments)  [] Make referral to Music Therapy 
[] Make referral to Pet Therapy    
[] Make referral to Addiction services 
[] Make referral to [facility] 
[] Make referral to Spiritual Care Partner 
[] No future visits requested       
[x] Follow up visits as needed Comments: Responded to Code Blue in progress; pt being under critical medical attention and no family present; Spiritual Care to follow up after the code or upon medical team's contact with family as appropriate. Rev. Bette Soto, Ph.D., M.Div., M.A., /Director of Spiritual Care Services Paging Service: [phone])

## 2020-11-02 NOTE — ACP (ADVANCE CARE PLANNING)
[address] [facility] Adult  Hospitalist Group Advance Care Planning Note Name: Kyle Doe YOB: 1935 MRN: [id number] Admission Date: 11/1/2020  5:16 AM 
 
Date of discussion: 11/2/2020 Active Diagnoses: 
 
Hospital Problems  Date Reviewed: 8/25/2020 Codes Class Noted POA Pneumonia ICD-10-CM: J18.9 ICD-9-CM: 354  11/1/2020 Unknown These active diagnoses are of sufficient risk that focused discussion on advance care planning is indicated in order to allow the patient to thoughtfully consider personal goals of care, and if situations arise that prevent the ability to personally give input, to ensure appropriate representation of their personal desires for different levels and aggressiveness of care. Discussion:  
 
Persons present and participating in discussion: Kyle Doe, Gloria Funez MD, daughter Jemima Topics Discussed: 
Patient's medical condition and diagnosis: [x  ] yes [  ] no  
Surrogate decision maker: [ x ] yes [  ] no Patient's current physical function/cognitive function/frailty: [ x ] yes [  ] no Code Status: [ x ] yes [  ] no  
Artificial Nutrition / Dialysis / Non-Invasive Ventilation / Blood Transfusion: [ x ] yes [  ] no Potential Resources for home (durable medical equipment, home nursing, home O2): [x  ] yes [  ] no Overview of Discussion:  
Discussed with daughter Zeke Knowles about his current condition. She is his legal POA. According to the daughter she wants aggressive care and all measures to keep him alive. She wants him to be full code and okay for intubation and ventilation if needed. Time Spent:  
 
Total time spent face-to-face in education and discussion: 20 minutes.   
 
Gloria Funez MD 
Date of Service:  11/2/2020 
1:51 PM

## 2020-11-02 NOTE — PROGRESS NOTES
Intubation Note Called to bedside secondary to  respiratory failure. Patient pre-oxygenated with 100% oxygen. Smooth RSI with Etomidate 0 mg + Succinylcholine 0 
mg IV. DVL x 2 
 
7.5 ETT taped and secured at 23 cm at the teeth. Tube confirmed by visualization and 
+ Bilateral BS, + Chest rise, + ETCO2 
 
VSS. CXR pending.  
 
Care turned over to covering  Attending MD.

## 2020-11-02 NOTE — PROGRESS NOTES
[address] [facility] Adult  Hospitalist Group Hospitalist Progress Note Marti Bailey MD 
Answering service: [phone] OR [phone] from in house phone Date of Service:  2020 NAME:  Mason Connelly :  1935 MRN:  [id number] Please note that this dictation was completed with [employer], the computer voice recognition software. Quite often unanticipated grammatical, syntax, homophones, and other interpretive errors are inadvertently transcribed by the computer software. Please disregard these errors. Please excuse any errors that have escaped final proofreading. Admission Summary:  
Mason Connelly is a 80 y.o. male who presents to the emergency room by EMS after falling at home. At the time of my evaluation, family not present at bedside and patient has dementia and cannot be communicated. Tried to call patient's wife listed on the emergency contact I cannot reach her. History was obtained from gathering information from ER records. It appears that patient has been falling twice at home trying to get out of bed last night. Fever reported by family, unclear if the fever was recorded. Upon evaluation in the ER patient was tachycardic and febrile. Pelvic x-ray and the right hip x-ray demonstrated no acute abnormality. CT head and CT C-spine shows no acute pathology. Chest x-ray shows diffuse interstitial and airspace opacities suspicious for viral pneumonia. Patient has been saturating well on room air. Tachycardia has improved at the time of my evaluation. Interval history / Subjective:  
Patient seen and examined today. Presented to the hospital with fall and fever. Rapid test for coronavirus is positive He has dementia and is not very conversive with me. Mild leg swelling seen bilaterally Intermittently coughing Assessment & Plan:  
 
 Sepsis Fever from COVID-19 Also has a UTI though I am not sure if the sample was clean Continue to follow blood cultures and urine cultures Pneumonia 
-Diffuse interstitial and airspace opacities on chest x-ray suspicious for viral pneumonia 
-Check blood cultures, check for COVID 19 PCR, check Legionella 
-Empiric coverage with cefepime and Decadron 
 on vitamin C and zinc 
Daughter the POA has approved for convalescent plasma treatment on 11/2/2020 Infectious diseases has been consulted for remdesivir treatment but I doubt he would qualify given his kidney dysfunction 
  
Acute kidney injury 2.55 On iv fluids Will consult nephrology Sees Dr. Roman Martinez  outpatient 
  
Hypertension 
-No home medication list available at this time 
-Blood pressure is stable 
  
Diabetes 
-No home medication list available at this time 
-Insulin sliding scale coverage 
  
Code status: full DVT prophylaxis: Lovenox per protocol Care Plan discussed with: Patient/Family Anticipated Disposition: Home w/Family Anticipated Discharge: Greater than 48 hours Hospital Problems  Date Reviewed: 8/25/2020 Codes Class Noted POA Pneumonia ICD-10-CM: J18.9 ICD-9-CM: 558  11/1/2020 Unknown Review of Systems: A comprehensive review of systems was negative except for that written in the HPI. Vital Signs:  
 Last 24hrs VS reviewed since prior progress note. Most recent are: 
Visit Vitals /62 (BP Patient Position: At rest) Pulse (!) 106 Temp 97.8 °F (36.6 °C) Resp 18 Ht 6' 0.01\" (1.829 m) Wt 98.6 kg (217 lb 6 oz) SpO2 94% BMI 29.47 kg/m² No intake or output data in the 24 hours ending 11/02/20 0905 Physical Examination:  
 
 
     
GEN: awake, alert, non-diaphoretic, no psychomotor agitation, no acute distress HEENT: Atraumatic, normocephalic, no rashes noted, no facial lesions noted. Eyes: No redness, no discharge, no swelling. No drainage observed from nose. CARDIOPULMONARY: no increased respiratory effort, speaking in clear sentences, I:E ratio WNL 
GI: Abdomen does not appear to be distended MSK: normal ROM in the neck, upper extremities and lower extremities SKIN: no lesions, wounds, erythema, or cyanosis noted on face or hands NEURO: cranial nerves grossly normal, normal speech rate and rhythm, moves both upper extremities equally PSYCH: appearance, behavior, and attitude - well groomed, pleasant, cooperative Data Review:  
 Review and/or order of clinical lab test 
 
 
Labs:  
 
Recent Labs 11/01/20 
6256 WBC 6.2 HGB 11.7* HCT 38.3  Recent Labs 11/01/20 
7229   
K 4.3  CO2 24 BUN 51* CREA 2.52* * CA 8.6 Recent Labs 11/01/20 
8425 ALT 30 AP 61 TBILI 0.5 TP 6.9 ALB 3.1*  
GLOB 3.8 No results for input(s): INR, PTP, APTT, INREXT in the last 72 hours. No results for input(s): FE, TIBC, PSAT, FERR in the last 72 hours. Lab Results Component Value Date/Time Folate 9.7 09/10/2015 11:17 AM  
  
No results for input(s): PH, PCO2, PO2 in the last 72 hours. No results for input(s): CPK, CKNDX, TROIQ in the last 72 hours. No lab exists for component: CPKMB Lab Results Component Value Date/Time Cholesterol, total 141 08/31/2019 02:24 AM  
 HDL Cholesterol 44 08/31/2019 02:24 AM  
 LDL, calculated 83.2 08/31/2019 02:24 AM  
 Triglyceride 69 08/31/2019 02:24 AM  
 CHOL/HDL Ratio 3.2 08/31/2019 02:24 AM  
 
Lab Results Component Value Date/Time Glucose (POC) 119 (H) 09/01/2019 07:43 AM  
 Glucose (POC) 124 (H) 08/31/2019 09:15 PM  
 Glucose (POC) 110 (H) 08/31/2019 05:37 PM  
 Glucose (POC) 101 (H) 08/31/2019 12:11 PM  
 Glucose (POC) 124 (H) 08/31/2019 07:02 AM  
 
Lab Results Component Value Date/Time  Color YELLOW/STRAW 11/01/2020 09:08 AM  
 Appearance CLOUDY (A) 11/01/2020 09:08 AM  
 Specific gravity 1.018 11/01/2020 09:08 AM  
 pH (UA) 5.0 11/01/2020 09:08 AM  
 Protein >300 (A) 11/01/2020 09:08 AM  
 Glucose Negative 11/01/2020 09:08 AM  
 Ketone TRACE (A) 11/01/2020 09:08 AM  
 Bilirubin Negative 11/01/2020 09:08 AM  
 Urobilinogen 0.2 11/01/2020 09:08 AM  
 Nitrites Negative 11/01/2020 09:08 AM  
 Leukocyte Esterase Negative 11/01/2020 09:08 AM  
 Epithelial cells MANY (A) 11/01/2020 09:08 AM  
 Bacteria Negative 11/01/2020 09:08 AM  
 WBC 5-10 11/01/2020 09:08 AM  
 RBC 20-50 11/01/2020 09:08 AM  
 
 
 
Medications Reviewed:  
 
Current Facility-Administered Medications Medication Dose Route Frequency  enoxaparin (LOVENOX) injection 30 mg  30 mg SubCUTAneous Q24H  
 acetaminophen (TYLENOL) tablet 650 mg  650 mg Oral Q6H PRN Or  
 acetaminophen (TYLENOL) suppository 650 mg  650 mg Rectal Q6H PRN  
 sodium chloride (NS) flush 5-40 mL  5-40 mL IntraVENous Q8H  
 sodium chloride (NS) flush 5-40 mL  5-40 mL IntraVENous PRN  polyethylene glycol (MIRALAX) packet 17 g  17 g Oral DAILY PRN  promethazine (PHENERGAN) tablet 12.5 mg  12.5 mg Oral Q6H PRN Or  
 ondansetron (ZOFRAN) injection 4 mg  4 mg IntraVENous Q6H PRN  
 cefepime (MAXIPIME) 2 g in 0.9% sodium chloride (MBP/ADV) 100 mL  2 g IntraVENous Q24H  
 ascorbic acid (vitamin C) (VITAMIN C) tablet 500 mg  500 mg Oral BID  zinc sulfate (ZINCATE) 220 (50) mg capsule 1 Cap  1 Cap Oral DAILY  dexamethasone (DECADRON) 4 mg/mL injection 4 mg  4 mg IntraVENous Q12H  
 
______________________________________________________________________ EXPECTED LENGTH OF STAY: - - - 
ACTUAL LENGTH OF STAY:          1 Antonio Corral MD

## 2020-11-02 NOTE — PROGRESS NOTES
Problem: Falls - Risk of 
Goal: *Absence of Falls Description: Document Liliana Caban Fall Risk and appropriate interventions in the flowsheet. Outcome: Progressing Towards Goal 
Note: Fall Risk Interventions: 
Mobility Interventions: Communicate number of staff needed for ambulation/transfer Mentation Interventions: Adequate sleep, hydration, pain control Elimination Interventions: Bed/chair exit alarm History of Falls Interventions: Bed/chair exit alarm

## 2020-11-02 NOTE — PROGRESS NOTES
11/01/20 2119 Vital Signs Temp (!) 101.2 °F (38.4 °C) Temp Source Axillary Pulse (Heart Rate) (!) 103 Heart Rate Source Monitor Resp Rate 18  
O2 Sat (%) 92 % Level of Consciousness Alert  
BP (!) 170/71 MAP (Calculated) 104 BP 1 Method Automatic  
BP 1 Location Right arm BP Patient Position At rest  
MEWS Score 4 Oxygen Therapy O2 Device Room air MEWS 4. Patient temp 101.2  Tylenol given. Will continue to monitor.

## 2020-11-02 NOTE — PROGRESS NOTES
Bedside and Verbal shift change report given to Luis Ramachandran RN (oncoming nurse) by Celia Meier (offgoing nurse). Report included the following information SBAR, Kardex and MAR.

## 2020-11-02 NOTE — CONSULTS
Infectious Disease Consult Today's Date: 11/2/2020 Admit Date: 11/1/2020 Impression: · COVID 19 positive · On room air prior to code · Acute cardiorespiratory event--?aspiration · Kidney injury Plan: · Adjust antibiotic therapy · Will treat COVID Anti-infectives: · Currently, maxipime Subjective:  
Date of Consultation:  November 2, 2020 Referring Physician: Dr Reji Caicedo Patient is a 80 y.o. male who was admitted after a fall. He has been found to be COVID positive, but has been on room air up until his code. He does have an elevated creatinine. He cannot provide history at this point. Patient Active Problem List  
Diagnosis Code  Hypertension I10  
 Arthritis M19.90  Anemia D64.9  Pure hypercholesterolemia E78.00  CAD (coronary artery disease) I25.10  Carotid stenosis, left I65.22  
 Nocturia R35.1  Hearing loss H91.90  Vitamin D deficiency E55.9  Sleep apnea G47.30  Edema of both legs R60.0  Memory disturbance R41.3  Gait disturbance R26.9  
 Uncontrolled type II diabetes mellitus ([address] E11.65  Diabetes mellitus ([address] E11.9  
 B12 deficiency E53.8  Cerebrovascular disease, unspecified I67.9  Post-concussion headache G44.309  Cervical post-laminectomy syndrome M96.1  Cervical spondylosis with myelopathy and radiculopathy M47.12, M47.22  Spinal stenosis of lumbar region with radiculopathy M48.061, M54.16  
 Diabetic peripheral neuropathy associated with type 2 diabetes mellitus ([address] E11.42  
 History of cerebral infarction Z86.73  
 Mood disorder (HCC) F39  
 Severe obesity ([address] E66.01  
 Pre-syncope R55  DEREK (acute kidney injury) ([address] N17.9  Dehydration E86.0  
 Urinary (tract) obstruction N13.9  Pneumonia J18.9 Past Medical History:  
Diagnosis Date  Advance directive discussed with patient 05/13/2015,05/25/2016  Anemia 6/16/2009  Anemia NEC  Arthritis 6/16/2009  BPH (benign prostatic hyperplasia)  CAD (coronary artery disease)  CAD (coronary artery disease) of artery bypass graft 2009  
 dr faye Ferreira or associate Dr Luis Mcnulty  Carotid stenosis  Chronic pain   
 back  Concussion syndrome  DM (diabetes mellitus) [facility]  Edema  Gait disturbance  Glaucoma suspect 14 VEI notes Darlina Score  Headache due to trauma 2012  
 hit by a device on a door  Headache(784.0)  Hearing difficulty  Hypercholesterolemia  Hypertension 2009  Kidney stone 7/10  
 dr Dani styles [facility] lithotripsy  Microalbuminuria 2013  Mild memory disturbance Unk Milks also  
 neuro associates Alisia Mckenna  Nasal septal deviation  Pain in back 2009  Screening for diabetic retinopathy 16  
 exam neg for retinopathy Stiven'/Faulkner  Sleep apnea  Vitamin D deficiency Family History Problem Relation Age of Onset  Heart Disease Mother  Heart Disease Father  Cancer Sister  Dementia Sister  Cancer Brother   
     leukemia  Arthritis-osteo Brother  Migraines Brother  Migraines Child  Arthritis-osteo Child Social History Tobacco Use  Smoking status: Former Smoker Packs/day: 0.50 Years: 30.00 Pack years: 15.00 Types: Cigarettes Last attempt to quit: 2014 Years since quittin.3  Smokeless tobacco: Never Used Substance Use Topics  Alcohol use: No  
  Comment: occassionally Past Surgical History:  
Procedure Laterality Date  CARDIAC SURG PROCEDURE UNLIST    
 stent   ENDOSCOPY, COLON, DIAGNOSTIC    
 in [place]- pt thinks in   HX BACK SURGERY    
 HX CORONARY STENT PLACEMENT    
 HX HEART CATHETERIZATION    
 HX HEENT    
 left cataract  HX ORTHOPAEDIC    
 back multiple times  HX OTHER SURGICAL  2012  
 temporal  artery bx  HX OTHER SURGICAL  14  
 lower ext doppler  HX OTHER SURGICAL  16  
 echo report EF 60%  HX PTCA Prior to Admission medications Medication Sig Start Date End Date Taking? Authorizing Provider  
lisinopriL (PRINIVIL, ZESTRIL) 2.5 mg tablet Take 2.5 mg by mouth daily. Provider, Historical  
pravastatin (PravachoL) 40 mg tablet Take 40 mg by mouth nightly. Provider, Historical  
finasteride (PROSCAR) 5 mg tablet Take 5 mg by mouth daily. Provider, Historical  
silodosin (RAPAFLO) 8 mg capsule Take 8 mg by mouth daily (with breakfast). Provider, Historical  
metFORMIN (GLUMETZA ER) 500 mg TG24 24 hour tablet Take 1,000 mg by mouth daily. Provider, Historical  
 
 
No Known Allergies Review of Systems:  Review of systems not obtained due to patient factors. Objective:  
 
Visit Vitals BP (!) 104/46 Pulse (!) 102 Temp 99.3 °F (37.4 °C) Resp 21 Ht 6' 0.01\" (1.829 m) Wt 98.6 kg (217 lb 6 oz) SpO2 94% BMI 29.47 kg/m² Temp (24hrs), Av.2 °F (36.8 °C), Min:97.4 °F (36.3 °C), Max:99.3 °F (37.4 °C) Lines:  Central Venous Catheter:    
 
Physical Exam:  Deferred secondary to COVID precautions Data Review: CBC: 
Recent Labs 20 
9024 WBC 6.2 GRANS 77* MONOS 7  
EOS 0 ANEU 4.8 ABL 1.0 HGB 11.7* HCT 38.3  BMP: 
Recent Labs 20 
1814 20 
1131 20 
0549 CREA NOTIFIED DODIE Robles@[employer] 2.55* 2.52* BUN NOTIFIED DODIE Robles@[employer] 61* 51* NA NOTIFIED DODIE Robles@[employer] 144 138  
K NOTIFIED DODIE Robles@[employer] 4.8 4.3 CL NOTIFIED DODIE Robles@[employer] 111* 106 CO2 NOTIFIED DODIE Robles@[employer] 21 24 AGAP NOTIFIED DODIE Robles@[employer] 12 8 GLU NOTIFIED DODIE Robles@[employer] 214* 176* LFTS: 
Recent Labs 20 
1814 20 
2899 TBILI NOTIFIED DODIE Robles@[employer] 0.5 ALT NOTIFIED DODIE Robles@[employer] 30  
AP NOTIFIED DODIE Robles@[employer] 61  
TP NOTIFIED DODIE Robles@[employer] 6.9 ALB NOTIFIED DODIE Robles@[employer] 3.1*  
 
 
 Microbiology:  
 
All Micro Results Procedure Component Value Units Date/Time CULTURE, URINE [[id number]] Collected:  11/01/20 0908 Order Status:  Completed Specimen:  Cath Urine Updated:  11/02/20 6337 Special Requests: NO SPECIAL REQUESTS Culture result: No growth (<1,000 CFU/ML) CULTURE, BLOOD, PAIRED [[id number]] Collected:  11/01/20 0650 Order Status:  Completed Specimen:  Blood Updated:  11/02/20 7434 Special Requests: NO SPECIAL REQUESTS Culture result: NO GROWTH AFTER 21 HOURS     
 LEGIONELLA PNEUMOPHILA AG, URINE [[id number]] Order Status:  Sent Specimen:  Urine URINE CULTURE HOLD SAMPLE [[id number]] Order Status:  Sent Specimen:  Urine Imaging:  
Reviewed Signed By: Jo Ann Peterson MD   
 November 2, 2020

## 2020-11-02 NOTE — CONSULTS
[address] INITIAL NOTE Presentation Nicholas Pang is a 80 y.o. male admitted s/p fall at home. Presented to ED with +fever/tachycardia. Initial evaluation amd work up [employer] CXR suspicious  for viral pneumonia. + for COVID-19. HX: Dementia/CAD w/ CABG/ DM2-no A1C on file/ HTN/ recent falls/ CKD. He currently lives at home. Family was not at bedside at admission for further history. DX:CT head/spine: negative/ Pelvic and hip xray negative Current clinical course has been complicated by hyperglycemia. Diabetes: Patient has known Type 2 diabetes-No recent A1C, treated with Metformin ER PTA. Family history unknown for diabetes. Consulted by Provider for advanced diabetes nursing assessment and care, specifically related to  
[] Transitioning off [employer] [x] Inpatient management strategy [] Home management assessment 
[] Survival skill education Diabetes-related medical history Acute complications Mild hyperglycemia Neurological complications TBD Microvascular disease Nephropathy Macrovascular disease CAD Other associated conditions HTN Diabetes medication history Drug class Currently in use Discontinued Never used Biguanide Meformin ER 1000mg daily DDP-4 inhibitor Sulfonylurea Thiazolidinedione GLP-1 RA SGLT-2 inhibitors Basal insulin Fixed Dose  Combinations Bolus insulin Subjective Mr. Lionel Pritchard is COVID-19 and under respiratory isolation. Unable to interview him by phone due to his history of dementia. Patient reports the following home diabetes self-care practices:deferred Objective Physical exam 
General Deferred due to COVID-19 isolation restrictions Vital Signs Visit Vitals /68 Pulse 68 Temp 99.3 °F (37.4 °C) Resp 18 Ht 6' 0.01\" (1.829 m) Wt 98.6 kg (217 lb 6 oz) SpO2 96% BMI 29.47 kg/m² Diabetic foot exam:Vibratory and Filament test: deferred due to clinical condition Laboratory Lab Results Component Value Date/Time Hemoglobin A1c 6.5 (H) 08/31/2019 02:24 AM  
 Hemoglobin A1c (POC) 6.8 02/25/2019 12:13 PM  
 
Lab Results Component Value Date/Time LDL, calculated 83.2 08/31/2019 02:24 AM  
 
Lab Results Component Value Date/Time Creatinine 2.55 (H) 11/02/2020 11:31 AM  
 
Lab Results Component Value Date/Time Sodium 144 11/02/2020 11:31 AM  
 Potassium 4.8 11/02/2020 11:31 AM  
 Chloride 111 (H) 11/02/2020 11:31 AM  
 CO2 21 11/02/2020 11:31 AM  
 Anion gap 12 11/02/2020 11:31 AM  
 Glucose 214 (H) 11/02/2020 11:31 AM  
 BUN 61 (H) 11/02/2020 11:31 AM  
 Creatinine 2.55 (H) 11/02/2020 11:31 AM  
 BUN/Creatinine ratio 24 (H) 11/02/2020 11:31 AM  
 GFR est AA 29 (L) 11/02/2020 11:31 AM  
 GFR est non-AA 24 (L) 11/02/2020 11:31 AM  
 Calcium 8.8 11/02/2020 11:31 AM  
 Bilirubin, total 0.5 11/01/2020 05:49 AM  
 Alk. phosphatase 61 11/01/2020 05:49 AM  
 Protein, total 6.9 11/01/2020 05:49 AM  
 Albumin 3.1 (L) 11/01/2020 05:49 AM  
 Globulin 3.8 11/01/2020 05:49 AM  
 A-G Ratio 0.8 (L) 11/01/2020 05:49 AM  
 ALT (SGPT) 30 11/01/2020 05:49 AM  
 
Lab Results Component Value Date/Time ALT (SGPT) 30 11/01/2020 05:49 AM  
 
 
Factors affecting BG pattern Factor Dose Comments Nutrition: 
Carb-controlled meals 60 grams/meal 
   
Drugs: 
Steroids Decadron 6mg daily Pain Infection COVID-19 UTI Blood glucose pattern Assessment and Plan Nursing Diagnosis Risk for unstable blood glucose pattern Nursing Intervention Domain 3593 Decision-making Support Nursing Interventions Examined current inpatient diabetes control Explored factors facilitating and impeding inpatient management Identified self-management practices impeding diabetes control Explored corrective strategies with patient and responsible inpatient provider Informed patient of rational for insulin strategy while hospitalized Evaluation Mr. Ortiz May, is + COVID-19 viral pneumonia and ?UTI. He has DM2- No A1C on file but test ordered. Since admission he is on daily Decadron IV. Lab BG trends today at 200mg/dl. Unsure of PO diet status and if he is eating. Hyperglycemia is expected in light of his  COVID-19, UTI, and steroid therapy. Given his advanced age, tight glycemic control is NOT recommended. Inpatient blood glucose management has been impacted by 
[x] Kidney dysfunction -GFR 24/ CR+2.55 [x] Erratic meal consumption 
[x] Glucocorticoid use 
[]  Pressor support Recommendations 1. CONTINUE Corrective insulin for BG >200mg/dl. _High sensitivity (ESRD) 2. If BG trends >200mg/dl consistently, consider INITIATING low dose Basal insulin 0.1u/kg dosing=8units daily. 3. [facility] B[facility] Code(s) I personally reviewed chart, notes, data and current medications in the medical record, and examined the patient at bedside before making care recommendations. Thank you for including us in their care. I spent 30minutes in direct patient care today for this patient. Time includes chart review,and collaboration with interdisciplinary care team.   
 
[facility] Access via [address] [place] [address] [phone]

## 2020-11-03 NOTE — CONSULTS
Nephrology Progress Note Zeina Aguilar Date of Admission : 11/1/2020 CC: Follow up for DEREK on CKD Assessment and Plan DEREK on CKD: 
- from [facility], COVID 
- not a candidate for RRT 
- too unstable to start CRRT at this time 
- would favor transition to comfort if family willing CKD IV Hyprekalemia: 
- K 5.5 Lactic acidosis COVID-19 PNA Sepsis Advanced dementia Interval History: Not examined in the room due to COVID-19. Per RN, not doing well. Actively dying. On multiple pressors. Current Medications: all current  Medications have been eviewed in [facility] Review of Systems: Review of systems not obtained due to patient factors. Objective: 
Vitals:   
Vitals:  
 11/03/20 0745 11/03/20 0800 11/03/20 0830 11/03/20 0900 BP:  (!) 145/73 139/77 (!) 144/69 Pulse: 69 70 69 67 Resp: 28 28 28 28 Temp:  97.4 °F (36.3 °C) SpO2: 100% 100% 100% 95% Weight:      
Height:      
 
Intake and Output: 
11/03 0701 - 11/03 1900 In: 532 [I.V.:532] Out: 0  
11/01 1901 - 11/03 0700 In: 4998.5 [I.V.:4998.5] Out: 70 [Urine:70] Physical Examination: Not examined in the room due to COVID-19 infection in order to preserve PPE Pt intubated    Yes General: Sedated on the vent CV:  RRR on monitor, low BP Neurologic:  sedated :  Henderson in place [x]    High complexity decision making was performed 
[]    Patient is at high-risk of decompensation with multiple organ involvement Lab Data Personally Reviewed: I have reviewed all the pertinent labs, microbiology data and radiology studies during assessment. Recent Labs 11/03/20 
0320 11/03/20 
0217 11/02/20 
2212 11/02/20 
1814  11/01/20 
6809  147* 146* NOTIFIED DODIE Hood@[employer]   < > 138  
K 5.5* 5.6* 6.7* NOTIFIED DODIE Hood@[employer]   < > 4.3  109* 111* NOTIFIED DODIE Hood@[employer]   < > 106 CO2 19* 19* 12* NOTIFIED DODIE Hood@[employer]   < > 24 * 313* 156* NOTIFIED STOLL [employer]   < > 176* BUN 75* 73* 73* NOTIFIED STOLL [employer]   < > 51* CREA 3.60* 3.63* 3.74* NOTIFIED STOLL [employer]   < > 2.52* CA 7.3* 7.8* 8.1* NOTIFIED STOLL [employer]   < > 8.6 MG  --   --  3.0*  --   --   --   
PHOS  --  8.5* 9.8*  --   --   --   
ALB 2.7* 2.1*  --  NOTIFIED STOLL [employer]  --  3.1* ALT >3,500*  --   --  NOTIFIED STOLL [employer]  --  30 INR  --   --  1.5*  --   --   --   
 < > = values in this interval not displayed. Recent Labs 11/03/20 
0320 11/02/20 
2212 11/01/20 
6039 WBC 14.8* 11.3* 6.2 HGB 11.1* 11.6* 11.7* HCT 37.8 39.8 38.3 * 202 183 Lab Results Component Value Date/Time Specimen Description: URINE 07/14/2010 02:50 PM  
 Specimen Description: URINE 06/24/2009 04:45 PM  
 
Lab Results Component Value Date/Time Culture result: No growth (<1,000 CFU/ML) 11/01/2020 09:08 AM  
 Culture result: NO GROWTH 2 DAYS 11/01/2020 06:50 AM  
 Culture result: NO GROWTH 1 DAY 07/14/2010 02:50 PM  
 
Recent Results (from the past 24 hour(s)) CONVALESCENT PLASMA, ALLOCATE Collection Time: 11/02/20 10:00 AM  
Result Value Ref Range Unit number [id number] Blood component type ConvPls,Th1   
 Unit division 00 Status of unit ALLOCATED METABOLIC PANEL, BASIC Collection Time: 11/02/20 11:31 AM  
Result Value Ref Range Sodium 144 136 - 145 mmol/L Potassium 4.8 3.5 - 5.1 mmol/L Chloride 111 (H) 97 - 108 mmol/L  
 CO2 21 21 - 32 mmol/L Anion gap 12 5 - 15 mmol/L Glucose 214 (H) 65 - 100 mg/dL BUN 61 (H) 6 - 20 MG/DL Creatinine 2.55 (H) 0.70 - 1.30 MG/DL  
 BUN/Creatinine ratio 24 (H) 12 - 20 GFR est AA 29 (L) >60 ml/min/1.73m2 GFR est non-AA 24 (L) >60 ml/min/1.73m2 Calcium 8.8 8.5 - 10.1 MG/DL  
POC EG7 Collection Time: 11/02/20  5:27 PM  
Result Value Ref Range Calcium, ionized (POC) 1.09 (L) 1.12 - 1.32 mmol/L  
 FIO2 (POC) 21 % pH (POC) 7.19 (LL) 7.35 - 7.45    
 pCO2 (POC) 24.8 (L) 35.0 - 45.0 MMHG  
 pO2 (POC) 33 (LL) 80 - 100 MMHG  
 HCO3 (POC) 9.3 (L) 22 - 26 MMOL/L Base deficit (POC) 19 mmol/L  
 sO2 (POC) 50 (L) 92 - 97 % Site LEFT BRACHIAL Device: ROOM AIR Allens test (POC) YES Specimen type (POC) ARTERIAL Total resp. rate 25 GLUCOSE, POC Collection Time: 11/02/20  6:07 PM  
Result Value Ref Range Glucose (POC) 148 (H) 65 - 100 mg/dL Performed by Devin Navarro TROPONIN I Collection Time: 11/02/20  6:14 PM  
Result Value Ref Range Troponin-I, Qt. HEMOLYZED,RECOLLECT REQUESTED <0.05 ng/mL METABOLIC PANEL, COMPREHENSIVE Collection Time: 11/02/20  6:14 PM  
Result Value Ref Range Sodium NOTIFIED DODIE Evans21GRAMS 136 - 145 mmol/L Potassium NOTIFIED DODIE [employer] 3.5 - 5.1 mmol/L Chloride NOTIFIED DODIE Evans21GR[employer] 97 - 108 mmol/L  
 CO2 NOTIFIED STOLLBARD Evans21GRAMS 21 - 32 mmol/L Anion gap NOTIFIED DODIE [email or web address] 5 - 15 mmol/L Glucose NOTIFIED DODIE Evans21GR[employer] 50 - 100 mg/dL BUN NOTIFIED DODIE [email or web address] 6 - 20 MG/DL Creatinine NOTIFIED STOLLBARD Evans21GRAMS MG/DL  
 BUN/Creatinine ratio NOTIFIED DODIE Evans21GRAMS 12 - 20 GFR est AA NOTIFIED DODIE [email or web address] >60 ml/min/1.73m2 GFR est non-AA NOTIFIED DODIE [email or web address] >60 ml/min/1.73m2 Calcium NOTIFIED DODIE Evans21GRAMS 8.5 - 10.1 MG/DL Bilirubin, total NOTIFIED DODIE Evans21GRAMS <1.0 MG/DL  
 ALT (SGPT) NOTIFIED STOLLNADIA Evans21GRAMS 30 - 65 U/L  
 AST (SGOT) NOTIFIED DODIE [email or web address] 15 - 37 U/L Alk. phosphatase NOTIFIED DODIE [email or web address] 50 - 136 U/L Protein, total NOTIFIED DODIE [email or web address] 6.4 - 8.2 g/dL Albumin NOTIFIED DODIE Evans21GRAMS 3.5 - 5.0 g/dL Globulin NOTIFIED DODIE [email or web address] 2.0 - 4.0 g/dL A-G Ratio NOTIFIED STOLLBARD Evans21GRAMS 1.1 - 2.2 EKG, 12 LEAD, INITIAL Collection Time: 11/02/20  6:54 PM  
Result Value Ref Range Ventricular Rate 93 BPM  
 Atrial Rate 93 BPM  
 P-R Interval 114 ms QRS Duration 114 ms Q-T Interval 352 ms QTC Calculation (Bezet) 437 ms Calculated R Axis 32 degrees Calculated T Axis 30 degrees Diagnosis Sinus rhythm with Baseline artifact Right bundle branch block When compared with ECG of 01-NOV-2020 06:41, No significant change Confirmed by Juan Miguel Noel MD. ([id number]) on 11/3/2020 12:18:49 AM 
  
PROTHROMBIN TIME + INR Collection Time: 11/02/20 10:12 PM  
Result Value Ref Range INR 1.5 (H) 0.9 - 1.1 Prothrombin time 15.1 (H) 9.0 - 11.1 sec PTT Collection Time: 11/02/20 10:12 PM  
Result Value Ref Range aPTT 39.8 (H) 22.1 - 32.0 sec  
 aPTT, therapeutic range     58.0 - 08.0 SECS  
METABOLIC PANEL, BASIC Collection Time: 11/02/20 10:12 PM  
Result Value Ref Range Sodium 146 (H) 136 - 145 mmol/L Potassium 6.7 (HH) 3.5 - 5.1 mmol/L Chloride 111 (H) 97 - 108 mmol/L  
 CO2 12 (LL) 21 - 32 mmol/L Anion gap 23 (H) 5 - 15 mmol/L Glucose 156 (H) 65 - 100 mg/dL BUN 73 (H) 6 - 20 MG/DL Creatinine 3.74 (H) 0.70 - 1.30 MG/DL  
 BUN/Creatinine ratio 20 12 - 20 GFR est AA 19 (L) >60 ml/min/1.73m2 GFR est non-AA 16 (L) >60 ml/min/1.73m2 Calcium 8.1 (L) 8.5 - 10.1 MG/DL MAGNESIUM Collection Time: 11/02/20 10:12 PM  
Result Value Ref Range Magnesium 3.0 (H) 1.6 - 2.4 mg/dL PHOSPHORUS Collection Time: 11/02/20 10:12 PM  
Result Value Ref Range Phosphorus 9.8 (H) 2.6 - 4.7 MG/DL  
LACTIC ACID Collection Time: 11/02/20 10:12 PM  
Result Value Ref Range Lactic acid 14.6 (HH) 0.4 - 2.0 MMOL/L FERRITIN Collection Time: 11/02/20 10:12 PM  
Result Value Ref Range Ferritin 12,660 (H) 26 - 388 NG/ML  
FIBRINOGEN Collection Time: 11/02/20 10:12 PM  
Result Value Ref Range Fibrinogen 467 200 - 475 mg/dL CBC WITH AUTOMATED DIFF Collection Time: 11/02/20 10:12 PM  
Result Value Ref Range WBC 11.3 (H) 4.1 - 11.1 K/uL  
 RBC 4.11 4.10 - 5.70 M/uL  
 HGB 11.6 (L) 12.1 - 17.0 g/dL HCT 39.8 36.6 - 50.3 % MCV 96.8 80.0 - 99.0 FL  
 MCH 28.2 26.0 - 34.0 PG  
 MCHC 29.1 (L) 30.0 - 36.5 g/dL  
 RDW 14.0 11.5 - 14.5 % PLATELET 503 626 - 602 K/uL MPV 10.7 8.9 - 12.9 FL  
 NRBC 0.2 (H) 0  WBC ABSOLUTE NRBC 0.02 (H) 0.00 - 0.01 K/uL NEUTROPHILS 86 (H) 32 - 75 % LYMPHOCYTES 10 (L) 12 - 49 % MONOCYTES 3 (L) 5 - 13 % EOSINOPHILS 0 0 - 7 % BASOPHILS 0 0 - 1 % IMMATURE GRANULOCYTES 2 (H) 0.0 - 0.5 % ABS. NEUTROPHILS 9.7 (H) 1.8 - 8.0 K/UL  
 ABS. LYMPHOCYTES 1.1 0.8 - 3.5 K/UL  
 ABS. MONOCYTES 0.3 0.0 - 1.0 K/UL  
 ABS. EOSINOPHILS 0.0 0.0 - 0.4 K/UL  
 ABS. BASOPHILS 0.0 0.0 - 0.1 K/UL  
 ABS. IMM. GRANS. 0.2 (H) 0.00 - 0.04 K/UL  
 DF AUTOMATED    
TROPONIN I Collection Time: 11/02/20 10:12 PM  
Result Value Ref Range Troponin-I, Qt. 1.38 (H) <0.05 ng/mL GLUCOSE, POC Collection Time: 11/03/20  1:54 AM  
Result Value Ref Range Glucose (POC) 277 (H) 65 - 100 mg/dL Performed by Mei GONCALVES RENAL FUNCTION PANEL Collection Time: 11/03/20  2:17 AM  
Result Value Ref Range Sodium 147 (H) 136 - 145 mmol/L Potassium 5.6 (H) 3.5 - 5.1 mmol/L Chloride 109 (H) 97 - 108 mmol/L  
 CO2 19 (L) 21 - 32 mmol/L Anion gap 19 (H) 5 - 15 mmol/L Glucose 313 (H) 65 - 100 mg/dL BUN 73 (H) 6 - 20 MG/DL Creatinine 3.63 (H) 0.70 - 1.30 MG/DL  
 BUN/Creatinine ratio 20 12 - 20 GFR est AA 19 (L) >60 ml/min/1.73m2 GFR est non-AA 16 (L) >60 ml/min/1.73m2 Calcium 7.8 (L) 8.5 - 10.1 MG/DL Phosphorus 8.5 (H) 2.6 - 4.7 MG/DL Albumin 2.1 (L) 3.5 - 5.0 g/dL GLUCOSE, POC Collection Time: 11/03/20  3:18 AM  
Result Value Ref Range Glucose (POC) 351 (H) 65 - 100 mg/dL Performed by Paulino Rice   
D DIMER Collection Time: 11/03/20  3:20 AM  
Result Value Ref Range D-dimer >35.20 (H) 0.00 - 0.65 mg/L FEU METABOLIC PANEL, BASIC  
 Collection Time: 11/03/20  3:20 AM  
Result Value Ref Range Sodium 145 136 - 145 mmol/L Potassium 5.5 (H) 3.5 - 5.1 mmol/L Chloride 106 97 - 108 mmol/L  
 CO2 19 (L) 21 - 32 mmol/L Anion gap 20 (H) 5 - 15 mmol/L Glucose 351 (H) 65 - 100 mg/dL BUN 75 (H) 6 - 20 MG/DL Creatinine 3.60 (H) 0.70 - 1.30 MG/DL  
 BUN/Creatinine ratio 21 (H) 12 - 20 GFR est AA 20 (L) >60 ml/min/1.73m2 GFR est non-AA 16 (L) >60 ml/min/1.73m2 Calcium 7.3 (L) 8.5 - 10.1 MG/DL  
HEPATIC FUNCTION PANEL Collection Time: 11/03/20  3:20 AM  
Result Value Ref Range Protein, total 5.9 (L) 6.4 - 8.2 g/dL Albumin 2.7 (L) 3.5 - 5.0 g/dL Globulin 3.2 2.0 - 4.0 g/dL A-G Ratio 0.8 (L) 1.1 - 2.2 Bilirubin, total 1.3 (H) 0.2 - 1.0 MG/DL Bilirubin, direct 0.7 (H) 0.0 - 0.2 MG/DL Alk. phosphatase 71 45 - 117 U/L  
 AST (SGOT) >2,000 (H) 15 - 37 U/L  
 ALT (SGPT) >3,500 (H) 12 - 78 U/L  
PTT Collection Time: 11/03/20  3:20 AM  
Result Value Ref Range aPTT 40.2 (H) 22.1 - 32.0 sec  
 aPTT, therapeutic range     58.0 - 77.0 SECS  
CBC WITH AUTOMATED DIFF Collection Time: 11/03/20  3:20 AM  
Result Value Ref Range WBC 14.8 (H) 4.1 - 11.1 K/uL  
 RBC 4.02 (L) 4.10 - 5.70 M/uL  
 HGB 11.1 (L) 12.1 - 17.0 g/dL HCT 37.8 36.6 - 50.3 % MCV 94.0 80.0 - 99.0 FL  
 MCH 27.6 26.0 - 34.0 PG  
 MCHC 29.4 (L) 30.0 - 36.5 g/dL  
 RDW 14.2 11.5 - 14.5 % PLATELET 048 (L) 216 - 400 K/uL MPV 11.6 8.9 - 12.9 FL  
 NRBC 0.1 (H) 0  WBC ABSOLUTE NRBC 0.02 (H) 0.00 - 0.01 K/uL NEUTROPHILS 90 (H) 32 - 75 % BAND NEUTROPHILS 5 0 - 6 % LYMPHOCYTES 3 (L) 12 - 49 % MONOCYTES 0 (L) 5 - 13 % EOSINOPHILS 0 0 - 7 % BASOPHILS 0 0 - 1 % METAMYELOCYTES 1 (H) 0 % MYELOCYTES 1 (H) 0 % IMMATURE GRANULOCYTES 0 %  
 ABS. NEUTROPHILS 14.1 (H) 1.8 - 8.0 K/UL  
 ABS. LYMPHOCYTES 0.4 (L) 0.8 - 3.5 K/UL  
 ABS. MONOCYTES 0.0 0.0 - 1.0 K/UL  
 ABS. EOSINOPHILS 0.0 0.0 - 0.4 K/UL ABS. BASOPHILS 0.0 0.0 - 0.1 K/UL  
 ABS. IMM. GRANS. 0.0 K/UL  
 DF MANUAL    
 RBC COMMENTS SUZAN CELLS 
PRESENT 
    
 WBC COMMENTS DOHLE BODIES    
TROPONIN I Collection Time: 11/03/20  3:20 AM  
Result Value Ref Range Troponin-I, Qt. 1.80 (H) <0.05 ng/mL LACTIC ACID Collection Time: 11/03/20  8:32 AM  
Result Value Ref Range Lactic acid 8.0 (HH) 0.4 - 2.0 MMOL/L  
GLUCOSE, POC Collection Time: 11/03/20  8:33 AM  
Result Value Ref Range Glucose (POC) 458 (H) 65 - 100 mg/dL Performed by Reina Freeman MD 
[address] [place] [address] [facility] Phone - [phone] Fax - [phone] 
[email or web address]

## 2020-11-03 NOTE — PROGRESS NOTES
ID Progress Note 
11/3/2020 Subjective:  
 
24 hour events noted--proned in ICU on vent and massive doses of pressors. Grim situation. Objective: Antibiotics: 1. Zosyn Vitals:  
Visit Vitals /67 Pulse 64 Temp 97.4 °F (36.3 °C) Resp 28 Ht 6' 0.01\" (1.829 m) Wt 98.6 kg (217 lb 6 oz) SpO2 100% BMI 29.47 kg/m² Tmax:  Temp (24hrs), Av.6 °F (36.4 °C), Min:96.6 °F (35.9 °C), Max:99.3 °F (37.4 °C) Exam:  Lungs:  Symmetrical expansion Heart:  regular rate and rhythm Abdomen: unable to assess Labs:     
Recent Labs 20 
0320 20 
0217 20 
2212 20 
1814 20 
1131 20 
0549 WBC 14.8*  --  11.3*  --   --  6.2 HGB 11.1*  --  11.6*  --   --  11.7* *  --  202  --   --  183 BUN 75* 73* 73* NOTIFIED STOLL [email or web address] 61* 51* CREA 3.60* 3.63* 3.74* NOTIFIED STOLL [employer] 2.55* 2.52* AP 71  --   --  NOTIFIED [employer]  --  61  
TBILI 1.3*  --   --  NOTIFIED [employer]  --  0.5 Cultures:  
 
Lab Results Component Value Date/Time Specimen Description: URINE 2010 02:50 PM  
 Specimen Description: URINE 2009 04:45 PM  
 
Lab Results Component Value Date/Time Culture result: No growth (<1,000 CFU/ML) 2020 09:08 AM  
 Culture result: NO GROWTH 2 DAYS 2020 06:50 AM  
 Culture result: NO GROWTH 1 DAY 2010 02:50 PM  
 
 
Radiology:  
 
Line/Insert Date:        
 
Assessment:  
 
1. COVID 19 positive--not sure how much a role this is playing--received a dose of remdesivir, but further doses prohibited by renal and hepatic function 2. Dementia 3. Debility 4. Shock--?aspiration event 5. Shock liver 6. DEREK Objective: 1. Continue current therapy 2. Comfort care seems most reasonable in this setting Chris Perez MD

## 2020-11-03 NOTE — PROGRESS NOTES
Bedside shift change report given to Jesus Manuel Jean-Baptiste RN (oncoming nurse) by Haja Mondragon RN (offgoing nurse). Report included the following information SBAR, Kardex, Intake/Output, MAR, Recent Results and Cardiac Rhythm Sinus Tach. 2000: Patient BP 70s/40s. Several attempts to obtain IV access failed. MD at bedside to place [facility]. Pressors started after line placement. 2200: Critical Lactic Acid: 14.6 
 
0000: Patient paralyzed and proned. O2 sats improving with prone positioning. 0330: , NP notified, Sliding scale modified and order for 1 time 12 units lispro added. Per NP hold first sliding scale insulin at 0500.

## 2020-11-03 NOTE — ACP (ADVANCE CARE PLANNING)
Advance Care Planning Advance Care Planning Activator (Inpatient) Conversation Note Date of ACP Conversation: 11/03/20 Conversation Conducted with:  See physician notes ACP Activator: Marni Nicole RN Health Care Decision Maker: 
 
Current Designated Health Care Decision Maker:   Primary Decision Maker: Blanca Haddad - Pam - [phone] Care Preferences Ventilation: \"If you were in your present state of health and suddenly became very ill and were unable to breathe on your own, what would your preference be about the use of a ventilator (breathing machine) if it were available to you? \"  Yes \"If your health worsens and it becomes clear that your chance of recovery is unlikely, what would your preference be about the use of a ventilator (breathing machine) if it were available to you? \" Yes Resuscitation \"CPR works best to restart the heart when there is a sudden event, like a heart attack, in someone who is otherwise healthy. Unfortunately, CPR does not typically restart the heart for people who have serious health conditions or who are very sick. \" \"In the event your heart stopped as a result of an underlying serious health condition, would you want attempts to be made to restart your heart yes [x] Yes  [] No   Educated Patient / BG Networking regarding differences between Advance Directives and portable DNR orders. Length of ACP Conversation in minutes:   
 
Conversation Outcomes: 
[x] ACP discussion completed 
[] Existing advance directive reviewed with patient; no changes to patient's previously recorded wishes 
 
 [] New Advance Directive completed 
 [] Portable Do Not Resuscitate prepared for Provider review and signature 
[] POLST/POST/MOLST/MOST prepared for Provider review and signature Follow-up plan:   
[x] Schedule follow-up conversation to continue planning 
[] Referred individual to Provider for additional questions/concerns [] Advised patient/agent/surrogate to review completed ACP document and update if needed with changes in condition, patient preferences or care setting  
 
[] This note routed to one or more involved healthcare providers

## 2020-11-03 NOTE — PROGRESS NOTES
BREA 
Presented to the ED from home s/p 2 falls. CXR: PNA COVID positive Admit with sepsis r/t pneumonia RUR 27% Disposition TBD 
 
 
PCP: First and Last name: Dr Leeann Birmingham Name of Practice: [facility] Are you a current patient: Yes/No: 
 Approximate date of last visit: unknown Can you do a virtual visit with your PCP: No 
          
Resources/supports as identified by patient/family:   Wife and Daughter Devon Olivo Top Challenges facing patient (as identified by patient/family and CM): Finances/Medication cost?       
           
Transportation? Support system or lack thereof? Wife and daughter Living arrangements? Lives in own home Self-care/ADLs/Cognition? Will need total care Current Advanced Directive/Advance Care Plan: On file Plan for utilizing home health:  NA    
              
Patient transferred to ICU s/p Cardiac arrest.is intubated. Patient is A&O x 1 at baseline, lives with his wife. daughter Devon Olivo is [facility] [phone]. Per notes daughter to discuss with family today regarding goals of care. Care management following. Trista Mcneill RN,Care Management Care Management Interventions PCP Verified by CM: Yes(Radha Roth ) Transition of Care Consult (CM Consult): (Has used [facility] in the past) MyChart Signup: No 
Discharge Durable Medical Equipment: No 
Physical Therapy Consult: Yes Occupational Therapy Consult: Yes Speech Therapy Consult: No 
Current Support Network: Lives with Spouse(Daughter Devon Olivo [facility] [phone])

## 2020-11-03 NOTE — PROGRESS NOTES
11/02/20 1920 Vent Settings FIO2 (%) 100 % SpO2/FIO2 Ratio 80 CMV Rate Set 25 Back-Up Rate 25 Vt Set (ml) 440 ml PEEP/VENT (cm H2O) 12 cm H20  
I:E Ratio 1.2.0 Insp Flow (l/min) 60 l/min Pressure Trigger 2.0 Vent Method/Mode Ventilation Method Conventional  
Ventilator Mode Assist control;Volume control Ventilator Mode ID [id number]  
$$ Ventilator Initial Initial Vent Day All ventilation setting approved by NP

## 2020-11-03 NOTE — PROGRESS NOTES
[facility] 
 
ICU TEAM Progress Note Name: Ankit Candelaria : 1935 MRN: [id number] Date: 11/3/2020 Assessment/Plan: 1. Cardiac arrest 
a. Likely related to hypoxia. Patient found with irregular respiratory pattern, unresponsive, and pulseless. Code blue initiated. Chest compressions for 1 minute with ROSC- no meds given. Patient intubated. Patient moving all extremities following intubation. No Code ICE indication. b. Patient now with central line, arterial line 
c. Post code labs pending 2. Septic shock due to COVID pneumonia 
a. Patient presented following a fall at home. Found to be COVID positive 
b. Receiving vitamin C, zinc, and decadron 
c. Will switch lovenox to heparin gtt given renal dysfunction 
d. Convalescent plasma ordered 
e. Remdesivir started by ID, but patient meets multiple exclusion criteria. Will dsiscuss with daytime pharmacist. 
f. Blood and sputum cultures pending. Urine legionella pending 
g. Continue to trend inflammatory markers 
h. Will cover for aspiration pneumonia given inability to protect airway prior to code/intubation. Continue Zosyn 
i. Continue levophed, vasopressin, and phenylephrine 3. Acute hypoxic respiratory failure c/b severe ARDS due to COVID pneumonia 
a. Patient on 100% FiO2 with PEEP 14, oxygen saturations in the 80s. 
b. Plan to start paralysis and prone 
c. Will obtain ABG following proning 
d. Lung protective ventilation 4. Severe anion gap metabolic acidosis 
a. In the setting of lactic acidosis and renal failure following code 
b. Patient has received 8 amps of bicarb and was started on a bicarb infusion 
c. Continue volume resuscitation with bicarb infusion 
d. Will given albumin 5% 25g now 5. DEREK on CKD stage IV 
a. Patient now with hyperkalemia, hyperphosphatemia, and acidosis b. Given shock and advanced dementia, patient not a dialysis candidate which Dr. Bozena Mora discussed with the family. 6. Diabetes a. Will order SSI 
 
 
F - Feeding:  Pending NPO currentl A - Analgesia: Fentanyl S - Sedation: Precedex and Versed T - DVT Prophylaxis: SCD's or Sequential Compression Device and Heparin H - Head of Bed: > 30 Degrees U - Ulcer Prophylaxis: Pepcid (famotidine) G - Glycemic Control: Insulin S - Spontaneous Breathing Trial: No 
B - Bowel Regimen: None needed at this time I - Indwelling Catheter: 
 Tubes: ETT and Orogastric Tube Lines: Peripheral IV, Arterial Line and [employer] Drains: Henderson Catheter D - De-escalation of Antibiotics: Zosyn Subjective:  
Progress Note: 11/3/2020 Reason for ICU Admission: post cardiac arrest 
 
HPI: Patient initially presented for falls at home. Found to be COVID positive. Patient with cardiac arrest likely related to hypoxia. Now in the ICU on multiple vasopressors with severe metabolic acidosis requiring paralyzation and proning. Overnight Events:  
11/3/2020 
- admitted to ICU 
 
POD: 
* No surgery found * S/P:  
 
 
Active Problem List:  
 
Problem List  Date Reviewed: 8/25/2020 Codes Class Pneumonia ICD-10-CM: J18.9 ICD-9-CM: 580 Dehydration ICD-10-CM: E86.0 ICD-9-CM: 276.51 Urinary (tract) obstruction ICD-10-CM: N13.9 ICD-9-CM: 599.60 Pre-syncope ICD-10-CM: R55 
ICD-9-CM: 780.2 DEREK (acute kidney injury) ([facility] ICD-10-CM: N17.9 ICD-9-CM: 617. 9 Severe obesity (HCC) ICD-10-CM: E66.01 
ICD-9-CM: 278.01 Mood disorder ([facility] ICD-10-CM: F39 
ICD-9-CM: 296.90 Cervical post-laminectomy syndrome ICD-10-CM: M96.1 ICD-9-CM: 722.81 Cervical spondylosis with myelopathy and radiculopathy ICD-10-CM: M47.12, M47.22 
ICD-9-CM: 721.1, 723.4 Spinal stenosis of lumbar region with radiculopathy ICD-10-CM: M48.061, M54.16 
ICD-9-CM: 724.02, 724.4 Diabetic peripheral neuropathy associated with type 2 diabetes mellitus (HCC) ICD-10-CM: E11.42 
ICD-9-CM: 250.60, 357.2 History of cerebral infarction ICD-10-CM: Z86.73 
ICD-9-CM: V12.54   
   
 B12 deficiency ICD-10-CM: E53.8 ICD-9-CM: 266.2 Cerebrovascular disease, unspecified ICD-10-CM: I67.9 ICD-9-CM: 437.9 Post-concussion headache ICD-10-CM: U94.385 ICD-9-CM: 339.20 Diabetes mellitus ([facility] ICD-10-CM: E11.9 ICD-9-CM: 250.00 Uncontrolled type II diabetes mellitus ([facility] ICD-10-CM: E11.65 ICD-9-CM: 250.02 Carotid stenosis, left ICD-10-CM: E76.41 
ICD-9-CM: 433.10 Nocturia ICD-10-CM: R35.1 ICD-9-CM: 788.43 Hearing loss ICD-10-CM: H91.90 ICD-9-CM: 101. 9 Vitamin D deficiency ICD-10-CM: E55.9 ICD-9-CM: 268.9 Sleep apnea ICD-10-CM: G47.30 ICD-9-CM: 780.57 Edema of both legs ICD-10-CM: R60.0 ICD-9-CM: 149. 3 Memory disturbance ICD-10-CM: R41.3 ICD-9-CM: 780.93 Gait disturbance ICD-10-CM: R26.9 ICD-9-CM: 781.2   
   
 CAD (coronary artery disease) ICD-10-CM: I25.10 ICD-9-CM: 414.00 Overview Addendum 11/30/2011  4:51 PM by Judith Presley MD  
  Medtronic stent in Lcx 12/2008 in [place], [address] [facility] Dobutamine cardiolite 11/2011 normal perfusion, EF 53% Pure hypercholesterolemia ICD-10-CM: E78.00 ICD-9-CM: 272.0 Hypertension ICD-10-CM: I10 
ICD-9-CM: 401.9 Arthritis ICD-10-CM: M19.90 ICD-9-CM: 716.90 Anemia ICD-10-CM: D64.9 ICD-9-CM: 285.9 Past Medical History:  
 
 has a past medical history of Advance directive discussed with patient (05/13/2015,05/25/2016), Anemia (6/16/2009), Anemia NEC, Arthritis (6/16/2009), BPH (benign prostatic hyperplasia), CAD (coronary artery disease), CAD (coronary artery disease) of artery bypass graft (6/16/2009), Carotid stenosis, Chronic pain, Concussion syndrome, DM (diabetes mellitus) ([facility] Edema, Gait disturbance, Glaucoma suspect (12/04/14), Headache due to trauma (1/2012), Headache(784.0), Hearing difficulty, Hypercholesterolemia, Hypertension (2009), Kidney stone (7/10), Microalbuminuria (2013), Mild memory disturbance ([facility] also), Nasal septal deviation, Pain in back (2009), Screening for diabetic retinopathy (16), Sleep apnea, and Vitamin D deficiency. Past Surgical History:  
 
 has a past surgical history that includes pr cardiac surg procedure unlist; hx orthopaedic; hx heart catheterization; hx ptca; hx coronary stent placement; hx back surgery; hx heent; endoscopy, colon, diagnostic; hx other surgical (2012); hx other surgical (14); and hx other surgical (16). Home Medications:  
 
Prior to Admission medications Medication Sig Start Date End Date Taking? Authorizing Provider  
lisinopriL (PRINIVIL, ZESTRIL) 2.5 mg tablet Take 2.5 mg by mouth daily. Provider, Historical  
pravastatin (PravachoL) 40 mg tablet Take 40 mg by mouth nightly. Provider, Historical  
finasteride (PROSCAR) 5 mg tablet Take 5 mg by mouth daily. Provider, Historical  
silodosin (RAPAFLO) 8 mg capsule Take 8 mg by mouth daily (with breakfast). Provider, Historical  
metFORMIN (GLUMETZA ER) 500 mg TG24 24 hour tablet Take 1,000 mg by mouth daily. Provider, Historical  
 
 
Allergies/Social/Family History: No Known Allergies Social History Tobacco Use  Smoking status: Former Smoker Packs/day: 0.50 Years: 30.00 Pack years: 15.00 Types: Cigarettes Last attempt to quit: 2014 Years since quittin.3  Smokeless tobacco: Never Used Substance Use Topics  Alcohol use: No  
  Comment: occassionally Family History Problem Relation Age of Onset  Heart Disease Mother  Heart Disease Father  Cancer Sister  Dementia Sister  Cancer Brother   
     leukemia  Arthritis-osteo Brother  Migraines Brother  Migraines Child  Arthritis-osteo Child Review of Systems: Review of systems not obtained due to patient factors. Objective:  
Vital Signs: 
Visit Vitals BP (!) 104/46 Pulse (!) 102 Temp 99.3 °F (37.4 °C) Resp 21 Ht 6' 0.01\" (1.829 m) Wt 98.6 kg (217 lb 6 oz) SpO2 94% BMI 29.47 kg/m² O2 Flow Rate (L/min): 3 l/min O2 Device: Room air Temp (24hrs), Av.2 °F (36.8 °C), Min:97.4 °F (36.3 °C), Max:99.3 °F (37.4 °C) Intake/Output:  
No intake or output data in the 24 hours ending 20 0054 Physical Exam: 
 
General:  Sedated and on the ventilator. No acute distress. Proned Eyes:  Sclera anicteric. Pupils equal, round, reactive to light. Mouth/Throat: Orotracheal tube in place. Neck: Supple. Lungs:   Clear to auscultation bilaterally, good effort. Cardiovascular:  Regular rate and rhythm, no murmur, click, rub, or gallop. Abdomen:   Soft, non-tender, bowel sounds normal, non-distended. Extremities: No cyanosis or edema. Skin: No acute rash or lesions. Lymph Nodes: Cervical and supraclavicular normal.  
Musculoskeletal:  No swelling or deformity. Lines/Devices:  Intact, no erythema, drainage, or tenderness. Psychiatric: Sedated and appears comfortable on ventilator. LABS AND  DATA: Personally reviewed Recent Labs 20 
7314 WBC 11.3* 6.2 HGB 11.6* 11.7* HCT 39.8 38.3  183 Recent Labs 20 
1814 * NOTIFIED DODIE Escobar@[employer] [facility]  
K 6.7* NOTIFIED DODIE Escobar@[facility]  
* NOTIFIED DODIE Escobar@[facility]  
CO2 12* NOTIFIED DODIE Escobar@[facility]  
BUN 73* NOTIFIED DODIE Escobar@[facility]  
CREA 3.74* NOTIFIED DODIE Escobar@[employer] [facility]  
* NOTIFIED DODIE Escobar@[facility]  
CA 8.1* NOTIFIED DODIE Escobar@[facility]  
MG 3.0*  --   
PHOS 9.8*  --   
 
Recent Labs 20 
1814 20 
6847 AP NOTIFIED DODIE Escobar@[facility] 61  
TP NOTIFIED DODIE Escobar@[facility] 6.9 ALB NOTIFIED DODIE Escobar@[facility] 3.1*  
GLOB NOTIFIED DODIE Escobar@[facility] 3.8 Recent Labs 11/02/20 2212 INR 1.5* PTP 15.1* APTT 39.8* Recent Labs 11/02/20 
1727 PHI 7.19* PCO2I 24.8*  
PO2I 33* FIO2I 21 Recent Labs 11/02/20 2212 11/02/20 
1814 TROIQ 1.38* HEMOLYZED,RECOLLECT REQUESTED Hemodynamics:  
PAP:   CO:    
Wedge:   CI:    
CVP:    SVR:    
  PVR:    
 
Ventilator Settings: 
Mode Rate Tidal Volume Pressure FiO2 PEEP Assist control, Volume control   440 ml    100 % 12 cm H20 Peak airway pressure: 21 cm H2O Minute ventilation: 12.9 l/min MEDS: Reviewed Chest X-Ray: CXR Results  (Last 48 hours) 11/02/20 1839  XR CHEST PORT Final result Impression:  IMPRESSION:  
1. Increasing airspace disease. 2. ET tube projects over the airway Narrative:  INDICATION: . intubated Additional history: COMPARISON: Previous chest xray, yesterday. LIMITATIONS: Portable technique. Mary Messing FINDINGS: Single frontal view of the chest.   
.  
Lines/tubes/surgical: An ET tube projects over the airway and terminates  
approximately 4 cm below the clavicular heads, above the isaias. Heart/mediastinum: Unremarkable. Lungs/pleura: Increasing patchy opacity in the right upper lobe. No visualized  
pleural effusion or pneumothorax. Additional Comments: None. .  
  
 11/01/20 6956  XR CHEST PORT Final result Impression:  IMPRESSION: Diffuse interstitial and airspace opacities suspicious for viral  
pneumonia (Covid 19) Narrative:  EXAM: XR CHEST PORT INDICATION: reported fevers, tachycardia COMPARISON: 8/30/2019 FINDINGS: A portable AP radiograph of the chest was obtained at 913 hours. The  
patient is on a cardiac monitor. Diffuse interstitial and airspace opacities. The cardiac and mediastinal contours and pulmonary vascularity are normal.  The  
bones and soft tissues are grossly within normal limits. 11/2 CXR: 1. Increasing airspace disease. 2. ET tube projects over the airway ECHO: 
8/31/19: Left Ventricle: Normal cavity size and systolic function (ejection fraction normal). Mild concentric hypertrophy. Estimated left ventricular ejection fraction is 56 - 60%. Visually measured ejection fraction. No regional wall motion abnormality noted. Multidisciplinary Rounds Completed:  Pending ABCDEF Bundle/Checklist Completed: 
Yes SPECIAL EQUIPMENT None DISPOSITION Stay in ICU CRITICAL CARE CONSULTANT NOTE I had a face to face encounter with the patient, reviewed and interpreted patient data including clinical events, labs, images, vital signs, I/O's, and examined patient. I have discussed the case and the plan and management of the patient's care with the consulting services, the bedside nurses and the respiratory therapist.   
 
NOTE OF PERSONAL INVOLVEMENT IN CARE This patient has a high probability of imminent, clinically significant deterioration, which requires the highest level of preparedness to intervene urgently. I participated in the decision-making and personally managed or directed the management of the following life and organ supporting interventions that required my frequent assessment to treat or prevent imminent deterioration. I personally spent 100 minutes of critical care time. This is time spent at this critically ill patient's bedside actively involved in patient care as well as the coordination of care and discussions with the patient's family. This does not include any procedural time which has been billed separately. JULIEN Armendariz [facility] Emergency Department Critical Care 
11/3/2020

## 2020-11-03 NOTE — DIABETES MGMT
[address] [facility] PROGRAM FOR DIABETES HEALTH 
 
FOLLOW UP  NOTE Presentation Rakel Ayers is a 80 y.o. male admitted s/p fall at home. Presented to ED with +fever/tachycardia. Initial evaluation amd work up [employer] CXR suspicious  for viral pneumonia. + for COVID-19. HX: Dementia/CAD w/ CABG/ DM2-no A1C on file/ HTN/ recent falls/ CKD. He currently lives at home. Family was not at bedside at admission for further history. DX:CT head/spine: negative/ Pelvic and hip xray negative Current clinical course has been complicated by hyperglycemia. Diabetes: Patient has known Type 2 diabetes-No recent A1C, treated with Metformin ER PTA. Family history unknown for diabetes. Consulted by Provider for advanced diabetes nursing assessment and care, specifically related to  
[] Transitioning off Rocio Bless [x] Inpatient management strategy [] Home management assessment 
[] Survival skill education Diabetes-related medical history Acute complications Mild hyperglycemia Neurological complications TBD Microvascular disease Nephropathy Macrovascular disease CAD Other associated conditions HTN Diabetes medication history Drug class Currently in use Discontinued Never used Biguanide Meformin ER 1000mg daily DDP-4 inhibitor Sulfonylurea Thiazolidinedione GLP-1 RA SGLT-2 inhibitors Basal insulin Fixed Dose  Combinations Bolus insulin Subjective MrHarsh Ledesma is COVID-19 and under respiratory isolation. Unable to interview him by phone due to his history of dementia. CODE Blue yesterday evening for respiratory/ cardiac arrest; transferred to ICU level of care. Patient reports the following home diabetes self-care practices:deferred Objective Physical exam 
General Deferred due to COVID-19 isolation restrictions Vital Signs Visit Vitals /77 Pulse 69 Temp 97.4 °F (36.3 °C) Resp 28 Ht 6' 0.01\" (1.829 m) Wt 98.6 kg (217 lb 6 oz) SpO2 100% BMI 29.47 kg/m² Diabetic foot exam:Vibratory and Filament test: deferred due to clinical condition Laboratory Lab Results Component Value Date/Time Hemoglobin A1c 6.5 (H) 08/31/2019 02:24 AM  
 Hemoglobin A1c (POC) 6.8 02/25/2019 12:13 PM  
 
Lab Results Component Value Date/Time LDL, calculated 83.2 08/31/2019 02:24 AM  
 
Lab Results Component Value Date/Time Creatinine 3.60 (H) 11/03/2020 03:20 AM  
 
Lab Results Component Value Date/Time Sodium 145 11/03/2020 03:20 AM  
 Potassium 5.5 (H) 11/03/2020 03:20 AM  
 Chloride 106 11/03/2020 03:20 AM  
 CO2 19 (L) 11/03/2020 03:20 AM  
 Anion gap 20 (H) 11/03/2020 03:20 AM  
 Glucose 351 (H) 11/03/2020 03:20 AM  
 BUN 75 (H) 11/03/2020 03:20 AM  
 Creatinine 3.60 (H) 11/03/2020 03:20 AM  
 BUN/Creatinine ratio 21 (H) 11/03/2020 03:20 AM  
 GFR est AA 20 (L) 11/03/2020 03:20 AM  
 GFR est non-AA 16 (L) 11/03/2020 03:20 AM  
 Calcium 7.3 (L) 11/03/2020 03:20 AM  
 Bilirubin, total 1.3 (H) 11/03/2020 03:20 AM  
 Alk. phosphatase 71 11/03/2020 03:20 AM  
 Protein, total 5.9 (L) 11/03/2020 03:20 AM  
 Albumin 2.7 (L) 11/03/2020 03:20 AM  
 Globulin 3.2 11/03/2020 03:20 AM  
 A-G Ratio 0.8 (L) 11/03/2020 03:20 AM  
 ALT (SGPT) >3,500 (H) 11/03/2020 03:20 AM  
 
Lab Results Component Value Date/Time ALT (SGPT) >3,500 (H) 11/03/2020 03:20 AM  
 
 
Factors affecting BG pattern Factor Dose Comments Nutrition: 
Carb-controlled meals 60 grams/meal 
  
NPO Drugs: 
Steroids Decadron 6mg daily Pain Infection COVID-19 UTI Blood glucose pattern Assessment and Plan Nursing Diagnosis Risk for unstable blood glucose pattern Nursing Intervention Domain 1826 Decision-making Support Nursing Interventions Examined current inpatient diabetes control Explored factors facilitating and impeding inpatient management Identified self-management practices impeding diabetes control Explored corrective strategies with patient and responsible inpatient provider Informed patient of rational for insulin strategy while hospitalized Evaluation Mr. Benigno Tan, is + COVID-19 viral pneumonia and ?UTI. He has DM2- No A1C on file but test ordered. Since admission he is on daily Decadron IV. Lab BG trends today at 200mg/dl. Unsure of PO diet status and if he is eating. Hyperglycemia is expected in light of his  COVID-19, UTI, and steroid therapy. Given his advanced age, tight glycemic control is NOT recommended. Today severely hyperglycemic post code blue-stress response from code/COVID-19/steroid dosing NO basal on board. It will be appropriate to INITIATE the insulin subcutaneous orderset with basal insulin. Inpatient blood glucose management has been impacted by 
[x] Kidney dysfunction -GFR worsened post code / CR+ grossly elevated 
[x] Erratic meal consumption 
[x] Glucocorticoid use 
[]  Pressor support Recommendations 1. INITIATE basal insulin-20 units daily (0.2u/kg dosing-conservative) This will cover for his DM/ and steroid. - START tonight. Billing Code(s) I personally reviewed chart, notes, data and current medications in the medical record, and examined the patient at bedside before making care recommendations. Thank you for including us in their care. I spent 15 minutes in direct patient care today for this patient. Time includes chart review,and collaboration with interdisciplinary care team.   
 
[facility] Access via [address] [place] [address] [phone]

## 2020-11-03 NOTE — PROCEDURES
[facility] Procedure Note - Central Venous Access:  
Performed by Sunil Live MD 
 
Obtained emergent Consent. Immediately prior to the procedure, the patient was reevaluated and found suitable for the planned procedure and any planned medications. Immediately prior to the procedure a time out was called to verify the correct patient, procedure, equipment, staff, and marking as appropriate. Central line Bundle: 
Full sterile barrier precautions used. 7-Step Sterility Protocol followed. (cap, mask sterile gown, sterile gloves, large sterile sheet, hand hygiene, 2% chlorhexidine for cutaneous antisepsis) 5 mL 1% Lidocaine placed at insertion site. Patient positioned in Trendelenburg?no The site was prepped with ChloraPrep and Sterile draping. Using Seldinger technique a Quad Lumen CVC was placed in the Right, Femoral Vein via direct cannulation with 1 number of attempts for Blood Drawing and IV Access. Ultrasound Guidance was utilized. There was good dark, non-pulsatile blood return in all ports. Femoral Site? yes. If Yes, reason femoral site was chosen: Emergent, patient had MAP of 30's, no IV access available There is clinical evidence showing infection rate is equal between Femoral site and IJ, subclavian site is the only one with decreased infection rate, see literature below Vanda Steven N, Rony B, Christine KALI, Maria D Narayanan S, Elizabeth M, Kassandra V, Rosalina M, Michele B, Leigh Ann A, Margarito X, Guerline N, Maria D B, Cattomika V, Serge LA, du Varun D; 3SITES Study Group. Intravascular Complications of Central Venous Catheterization by Insertion Site. [facility] J Med. 2015 Sep 24;373(13):1220-9. doi: 10.1056/EHWLvf2707691. PMID: 11848889. Catheter secured. Biopatch in place? yes. Sterile Bio-occlusive dressing placed. The following complications were encountered: None. A follow-up chest x-ray was not ordered post procedure. The procedure was tolerated well. Lucina Simmonds, MD 
Critical Care Medicine [facility]

## 2020-11-03 NOTE — DEATH NOTE
Death Declaration [facility] Pt Name  Espinoza Gonzalez Admit date:  11/1/2020 Date and time of death:  11/03/20 @ [phone] Room Number  7102/01 Medical Record Number  [id number] @ . [address]  
Age  80 y.o. Date of Birth 1935 PCP Liban Bell MD  
Attending physician Miky Bynum DO Code Status  DNR Patient seen and examined Mental status   Unresponsive Pupils Dilated and Fixed Respiration Nil Pulse  Absent Heart Sounds  Absent Rhythm  Flat line Family  Notified by Nursing staff [facility]  Notified by Nursing staff Death certificate and discharge summary completion remain  Dr. Miky Bynum, DO's responsibility. 11/3/2020 Ran Nayak [facility] Critical Care Medicine [facility]

## 2020-11-03 NOTE — CONSULTS
Palliative Medicine Consult [place]: [phone] ([phone]) Patient Name: Kimberly Hauser YOB: 1935 Date of Initial Consult: 11/3/2020 Reason for Consult: care decisions Requesting Provider: Dr. Herbie Vogel Primary Care Physician: Fide Pollock MD 
 
 SUMMARY:  
Kimberly Hauser is a 80 y.o. with a past history of vascular dementia (no longer taking his medications for last few months) , DM w CKD3, hearing loss, spinal stenosis,  obesity/ sleep apnea, HTN, CAD s/p CABG 2009, who was admitted on 11/1/2020 from home with a diagnosis of Fall at home (out of bed, unwitnessed) . Current medical issues leading to Palliative Medicine involvement include: Found on admission to have fever/ tachycardia, CXR c/w viral pneumonia, COVID came back positive, CODE BLUE (11/2 at [address]), now in ICU with acute respiratory failure/ multi organ failure, rising creatinine and LFTs, on pressors, paralyzed and prone on vent. Patient is  to second wife Jose Hernadez (she has dementia, requires full time care) Daughter,  Shirley Malin is primary caregiver (retired hospice nurse) [phone]-- she is patient's medical and financial POA. (this document is at home, completed in Jan 2020 according to Heidi John) AMD on chart from 2017 is outdated (Jan 2020 is more recent) Patient has other children- sons, Delfina Martinez PALLIATIVE DIAGNOSES:  
1. COVID 19 acute respiratory failure ARDS 2. Acute renal failure, not a candidate for dialysis 3. Acute liver failure 4. Septic shock 5. Lactic acidosis 6. Anemia 7. Poor prognosis, outlook grim,  dying no matter what we do PLAN:  
1. Call placed to family, Shirley Malin. Introduced palliative medicine services. Heidi Jonh is a retired hospice nurse, has good insight into current issues. She has accurate understanding that her dad is dying, ventilator only prolonging dying process.   She has now had a chance to talk with rest of family, they are in agreement that they want him comfortable. 1. Jolie Corado has been primary caregiver for patient and his wife Deborrsheryl Alcaraz), who both have advanced dementia. 2. Jolie Corado has medical and financial POA for her father (completed Jan 2020), she has been in communication with rest of family, all in agreement that they \"just want him comfortable\" 3. We talk about comfort approach, option of compassionate removal of ventilator, allowing natural passing. Expect he may not last long due to multi organ failure. 4. Jolie Corado would like to be on Zoom for the removal of ventilator, comfort measures. 5. She is ready for process of transition to comfort to happen now. 2. Discussed with bedside nurse. Turn off nimbex now. 3. Comfort care order set initiated. Consult  4. Once nimbex out of system, nurse will call dtr to begin compassionate removal of ventilator. 5. Initial consult note routed to primary continuity provider and/or primary health care team members 6. Communicated plan of care with: Palliative [facility] 192 Team 
 
 GOALS OF CARE / TREATMENT PREFERENCES:  
 
GOALS OF CARE: 
Patient/Health Care Proxy Stated Goals: Comfort TREATMENT PREFERENCES:  
Code Status: Full Code Advance Care Planning: 
[] The [facility] Interdisciplinary Team has updated the ACP Navigator with [place] and Patient Capacity Primary Decision Maker: Jessica Kinney - Daughter - [phone] Advance Care Planning 11/1/2020 Patient's Healthcare Decision Maker is: -  
Primary Decision Maker Name -  
Primary Decision Maker Phone Number -  
Primary Decision Maker Relationship to Patient - Secondary Decision Maker Name - Secondary Decision Maker Phone Number - Secondary Decision Maker Relationship to Patient -  
Confirm Advance Directive None Medical Interventions: Comfort measures Other Instructions: Other: As far as possible, the palliative care team has discussed with patient / health care proxy about goals of care / treatment preferences for patient. HISTORY:  
 
History obtained from: chart, dtr CHIEF COMPLAINT: fall at home HPI/SUBJECTIVE: The patient is:  
[] Verbal and participatory [x] Non-participatory due to:  
 
80year old male had a fall at home In ER found to have fever, tachycardia and pneumonia on chest xray Clinical Pain Assessment (nonverbal scale for severity on nonverbal patients):  
Clinical Pain Assessment Severity: 0 Activity (Movement): Lying quietly, normal position Duration: for how long has pt been experiencing pain (e.g., 2 days, 1 month, years) Frequency: how often pain is an issue (e.g., several times per day, once every few days, constant) FUNCTIONAL ASSESSMENT:  
 
Palliative Performance Scale (PPS): PPS: 10 PSYCHOSOCIAL/SPIRITUAL SCREENING:  
 
Palliative IDT has assessed this patient for cultural preferences / practices and a referral made as appropriate to needs (Cultural Services, Patient Advocacy, Ethics, etc.) Any spiritual / Hinduism concerns: 
[] Yes /  [x] No 
 
Caregiver Burnout: 
[] Yes /  [x] No /  [] No Caregiver Present Anticipatory grief assessment:  
[x] Normal  / [] Maladaptive ESAS Anxiety: ESAS Depression:    
 
 
 REVIEW OF SYSTEMS:  
 
Positive and pertinent negative findings in ROS are noted above in HPI. The following systems were [x] reviewed / [] unable to be reviewed as noted in HPI Other findings are noted below. Systems: constitutional, ears/nose/mouth/throat, respiratory, gastrointestinal, genitourinary, musculoskeletal, integumentary, neurologic, psychiatric, endocrine. Positive findings noted below. Modified ESAS Completed by: provider Pain: 0 Dyspnea: 0 Stool Occurrence(s): 1 PHYSICAL EXAM:  
 
From RN flowsheet: 
Wt Readings from Last 3 Encounters: 11/01/20 217 lb 6 oz (98.6 kg) 08/25/20 222 lb (100.7 kg) 09/11/19 230 lb 12.8 oz (104.7 kg) Blood pressure 130/63, pulse 64, temperature 97.4 °F (36.3 °C), resp. rate 28, height 6' 0.01\" (1.829 m), weight 217 lb 6 oz (98.6 kg), SpO2 100 %. Pain Scale 1: Adult Nonverbal Pain Scale Pain Intensity 1: 0 Last bowel movement, if known:  
[facility] He is currently sedated, paralyzed on ventilator in prone position HISTORY:  
 
Active Problems: 
  Pneumonia (11/1/2020) Past Medical History:  
Diagnosis Date  Advance directive discussed with patient 05/13/2015,05/25/2016  Anemia 6/16/2009  Anemia NEC  Arthritis 6/16/2009  BPH (benign prostatic hyperplasia)  CAD (coronary artery disease)  CAD (coronary artery disease) of artery bypass graft 6/16/2009  
 dr faye Willams or associate Dr Ogden Chimera  Carotid stenosis  Chronic pain   
 back  Concussion syndrome  DM (diabetes mellitus) ([facility] [address]  Edema  Gait disturbance  Glaucoma suspect 12/04/14 [facility] notes Verneta Holts  Headache due to trauma 1/2012  
 hit by a device on a door  Headache(784.0)  Hearing difficulty  Hypercholesterolemia  Hypertension 6/16/2009  Kidney stone 7/10  
 dr Beti styles [facility] lithotripsy  Microalbuminuria 2/2013  Mild memory disturbance Akshat Bench also  
 neuro associates Alisia Mckenna  Nasal septal deviation  Pain in back 6/16/2009  Screening for diabetic retinopathy 7/18/16  
 exam neg for retinopathy Stiven'/Faulkner  Sleep apnea  Vitamin D deficiency Past Surgical History:  
Procedure Laterality Date  CARDIAC SURG PROCEDURE UNLIST    
 stent 2008  ENDOSCOPY, COLON, DIAGNOSTIC    
 in [employer]-  thinks in 2006  HX BACK SURGERY    
 HX CORONARY STENT PLACEMENT    
 HX HEART CATHETERIZATION    
 HX HEENT    
 left cataract  HX ORTHOPAEDIC    
 back multiple times  HX OTHER SURGICAL  2012  
 temporal  artery bx  HX OTHER SURGICAL  14  
 lower ext doppler  HX OTHER SURGICAL  16  
 echo report EF 60%  HX PTCA Family History Problem Relation Age of Onset  Heart Disease Mother  Heart Disease Father  Cancer Sister  Dementia Sister  Cancer Brother   
     leukemia  Arthritis-osteo Brother  Migraines Brother  Migraines Child  Arthritis-osteo Child History reviewed, no pertinent family history. Social History Tobacco Use  Smoking status: Former Smoker Packs/day: 0.50 Years: 30.00 Pack years: 15.00 Types: Cigarettes Last attempt to quit: 2014 Years since quittin.3  Smokeless tobacco: Never Used Substance Use Topics  Alcohol use: No  
  Comment: occassionally No Known Allergies Current Facility-Administered Medications Medication Dose Route Frequency  glucose chewable tablet 16 g  4 Tab Oral PRN  
 glucagon (GLUCAGEN) injection 1 mg  1 mg IntraMUSCular PRN  
 dextrose 10% infusion 0-250 mL  0-250 mL IntraVENous PRN  
 insulin lispro (HUMALOG) injection   SubCUTAneous Q4H  
 sodium bicarbonate (8.4%) 150 mEq in sterile water 1,000 mL infusion   IntraVENous CONTINUOUS  
 famotidine (PF) (PEPCID) 20 mg in 0.9% sodium chloride 10 mL injection  20 mg IntraVENous DAILY  chlorhexidine (ORAL CARE KIT) 0.12 % mouthwash 15 mL  15 mL Oral Q12H  
 insulin glargine (LANTUS) injection 20 Units  20 Units SubCUTAneous DAILY  dexamethasone (DECADRON) 4 mg/mL injection 6 mg  6 mg IntraVENous DAILY  0.9% sodium chloride infusion 250 mL  250 mL IntraVENous PRN  
 dexmedeTOMidine in 0.9 % NaCl (PRECEDEX) 400 mcg/100 mL (4 mcg/mL) infusion soln  0.1-1.5 mcg/kg/hr IntraVENous TITRATE  
 NOREPINephrine (LEVOPHED) 8 mg in 5% dextrose 250mL (32 mcg/mL) infusion  0.5-80 mcg/min IntraVENous TITRATE  vasopressin (VASOSTRICT) 20 Units in 0.9% sodium chloride 100 mL infusion  0-0.04 Units/min IntraVENous TITRATE  midazolam in normal saline (VERSED) 1 mg/mL infusion  0-10 mg/hr IntraVENous TITRATE  fentaNYL (PF) 1,500 mcg/30 mL (50 mcg/mL) infusion  0-200 mcg/hr IntraVENous TITRATE  fentaNYL citrate (PF) injection  mcg   mcg IntraVENous Q1H PRN  
 cisatracurium (NIMBEX) 2 mg/mL IV infusion  0.5-10 mcg/kg/min IntraVENous TITRATE  piperacillin-tazobactam (ZOSYN) 3.375 g in 0.9% sodium chloride (MBP/ADV) 100 mL  3.375 g IntraVENous Q12H  
 dextrose 10% infusion 125-250 mL  125-250 mL IntraVENous PRN  
 heparin 25,000 units in D5W 250 ml infusion  10-25 Units/kg/hr IntraVENous TITRATE  acetaminophen (TYLENOL) tablet 650 mg  650 mg Oral Q6H PRN Or  
 acetaminophen (TYLENOL) suppository 650 mg  650 mg Rectal Q6H PRN  
 sodium chloride (NS) flush 5-40 mL  5-40 mL IntraVENous Q8H  
 sodium chloride (NS) flush 5-40 mL  5-40 mL IntraVENous PRN  polyethylene glycol (MIRALAX) packet 17 g  17 g Oral DAILY PRN  promethazine (PHENERGAN) tablet 12.5 mg  12.5 mg Oral Q6H PRN Or  
 ondansetron (ZOFRAN) injection 4 mg  4 mg IntraVENous Q6H PRN  
 ascorbic acid (vitamin C) (VITAMIN C) tablet 500 mg  500 mg Oral BID  zinc sulfate (ZINCATE) 220 (50) mg capsule 1 Cap  1 Cap Oral DAILY  
 
 
 
 LAB AND IMAGING FINDINGS:  
 
Lab Results Component Value Date/Time WBC 14.8 (H) 11/03/2020 03:20 AM  
 HGB 11.1 (L) 11/03/2020 03:20 AM  
 PLATELET 158 (L) 46/56/8780 03:20 AM  
 
Lab Results Component Value Date/Time Sodium 143 11/03/2020 01:24 PM  
 Potassium 5.5 (H) 11/03/2020 01:24 PM  
 Chloride 103 11/03/2020 01:24 PM  
 CO2 25 11/03/2020 01:24 PM  
 BUN 83 (H) 11/03/2020 01:24 PM  
 Creatinine 4.37 (H) 11/03/2020 01:24 PM  
 Calcium 6.7 (L) 11/03/2020 01:24 PM  
 Magnesium 3.0 (H) 11/02/2020 10:12 PM  
 Phosphorus 8.5 (H) 11/03/2020 02:17 AM  
  
Lab Results Component Value Date/Time Alk.  phosphatase 71 11/03/2020 03:20 AM  
 Protein, total 5.9 (L) 11/03/2020 03:20 AM  
 Albumin 2.7 (L) 11/03/2020 03:20 AM  
 Globulin 3.2 11/03/2020 03:20 AM  
 
Lab Results Component Value Date/Time INR 1.5 (H) 11/02/2020 10:12 PM  
 Prothrombin time 15.1 (H) 11/02/2020 10:12 PM  
 aPTT 44.4 (H) 11/03/2020 01:24 PM  
  
Lab Results Component Value Date/Time Ferritin 12,660 (H) 11/02/2020 10:12 PM  
  
No results found for: PH, PCO2, PO2 No components found for: [place] No results found for: CPK, CKMB Total time: 70min Counseling / coordination time, spent as noted above: 45 
> 50% counseling / coordination?: yes Prolonged service was provided for  []30 min   []75 min in face to face time in the presence of the patient, spent as noted above. Time Start:  
Time End:  
Note: this can only be billed with 04768 (initial) or 92204 (follow up). If multiple start / stop times, list each separately.

## 2020-11-03 NOTE — PROGRESS NOTES
Patient awake this morning, laying in bed Problem: General Medical Care Plan Goal: *Vital signs within specified parameters Outcome: Progressing Towards Goal 
Goal: *Labs within defined limits Outcome: Progressing Towards Goal 
Goal: *Absence of infection signs and symptoms Outcome: Progressing Towards Goal 
Goal: *Optimal pain control at patient's stated goal 
Outcome: Progressing Towards Goal 
Goal: *Skin integrity maintained Outcome: Progressing Towards Goal 
Goal: *Fluid volume balance Outcome: Progressing Towards Goal 
Goal: *Optimize nutritional status Outcome: Progressing Towards Goal 
Goal: *Anxiety reduced or absent Outcome: Progressing Towards Goal 
Goal: *Progressive mobility and function (eg: ADL's) Outcome: Progressing Towards Goal 
  
Problem: Patient Education: Go to Patient Education Activity Goal: Patient/Family Education Outcome: Progressing Towards Goal 
  
Problem: Falls - Risk of 
Goal: *Absence of Falls Description: Document Alexander Abdul Fall Risk and appropriate interventions in the flowsheet. Outcome: Progressing Towards Goal 
Note: Fall Risk Interventions: 
Mobility Interventions: Communicate number of staff needed for ambulation/transfer, Bed/chair exit alarm Mentation Interventions: Bed/chair exit alarm, Adequate sleep, hydration, pain control Elimination Interventions: Bed/chair exit alarm, Toileting schedule/hourly rounds History of Falls Interventions: Bed/chair exit alarm Problem: Patient Education: Go to Patient Education Activity Goal: Patient/Family Education Outcome: Progressing Towards Goal 
  
Problem: Pressure Injury - Risk of 
Goal: *Prevention of pressure injury Description: Document Ildefonso Scale and appropriate interventions in the flowsheet.  
Outcome: Progressing Towards Goal 
Note: Pressure Injury Interventions: 
  
 
Moisture Interventions: Absorbent underpads, Apply protective barrier, creams and emollients, Check for incontinence Q2 hours and as needed Activity Interventions: PT/OT evaluation Mobility Interventions: HOB 30 degrees or less, Pressure redistribution bed/mattress (bed type) Nutrition Interventions: Document food/fluid/supplement intake Problem: Patient Education: Go to Patient Education Activity Goal: Patient/Family Education Outcome: Progressing Towards Goal 
  
Problem: Non-Violent Restraints Goal: *Removal from restraints as soon as assessed to be safe Outcome: Progressing Towards Goal 
Goal: *No harm/injury to patient while restraints in use Outcome: Progressing Towards Goal 
Goal: *Patient's dignity will be maintained Outcome: Progressing Towards Goal 
Goal: *Patient Specific Goal (EDIT GOAL, INSERT TEXT) Outcome: Progressing Towards Goal 
Goal: Non-violent Restaints:Standard Interventions Outcome: Progressing Towards Goal 
Goal: Non-violent Restraints:Patient Interventions Outcome: Progressing Towards Goal 
Goal: Patient/Family Education Outcome: Progressing Towards Goal 
  
Problem: Ventilator Management Goal: *Adequate oxygenation and ventilation Outcome: Progressing Towards Goal 
Goal: *Patient maintains clear airway/free of aspiration Outcome: Progressing Towards Goal 
Goal: *Absence of infection signs and symptoms Outcome: Progressing Towards Goal 
Goal: *Normal spontaneous ventilation Outcome: Progressing Towards Goal 
  
Problem: Patient Education: Go to Patient Education Activity Goal: Patient/Family Education Outcome: Progressing Towards Goal

## 2020-11-03 NOTE — PROGRESS NOTES
Occupational Therapy Chart reviewed with patient received sedated and on vent. Per ABCDE protocol, will work with patient when PEEP is 10.0 or less, FIO2 60% or less, and patient is following basic commands. At this time, patient PEEP 12.0 and FIO2 100%, not appropriate for skilled OT intervention at this time. Will continue to follow patent peripherally until medically appropriate. *Of note, patient on room air prior to Code Blue called yesterday evening. Per chart review, admitted for x2 falls at home and patient oriented x1 at baseline (vascular dementia). Recommend nursing to complete with patient, as able, in order to promote cardiopulmonary systems, maintain strength, endurance and independence:  
-bed in chair position with foot board on 3x/day ~30-60 mins each and/OR reverse trendelenburg with foot board on and non-skid footwear 
-passive ROM during bathing B UEs and LEs 
-positioning to prevent contractures and edema. Thank you for your assistance.   
 
Ammy Roque OTR/L

## 2020-11-03 NOTE — PROGRESS NOTES
Physical Therapy Consult received and chart reviewed. Patient admitted from home with fall and pneumonia and found to be COVID positive. He was intubated and remains sedated at this time and is not medically ready for PT assessment. Following.  
 
Creed Flattery, PT

## 2020-11-03 NOTE — PROGRESS NOTES
0730: Shift report received from [place] BILL Silva using SBAR format. 
 
0800: Assessment completed, see doc-flowsheets for assessment findings. [facility] not notified of assessment findings(GCS score-3) due to patient's condition. Patient is Intubated, Sedated, and Pharmacologically paralyzed. 0900: Attending NP Alex Ash notified of , NP to place new orders. 0930: Attending NP made aware of critical Lactic result(8.0), no orders received. 1153: Convalescent plasma started per order. 1435: Dr. Xochitl Barahona at bedside from palliative updating about change in plan of care to Comfort measures starting now. MD to place new orders. 1545: DeProned to supine, RNX3 and RTX1 at bedside. VSS. 
 
1630: Extubated to compassionate care. RT and RN at bedside. Patient's family on Zoom Call. Medications given per orders(Verbal and written) for Dr. Xochitl Barahona. MD said to let fentaNYL and Versed gtt continue to infise at the rate it was infusing. 1649: Monitor beeping \"Asystole\", this RN at patient's bedside, family on Zoom call. Family updated on patient's condition. 1655: Attending NP Alex Ash at bedside. 1712: [facility] called to notify of patient's demise. Left voicemail with patient's information and hospital information. 1730: Called patient's daughter Alric Drivers to ask about patient's belongings. Belongings include Striped T-ShirtX1, Black Sweat pantsX1, and a 's License. Mark Coburn said to discard T-shirt and Sweat pants, but to keep 's License with the hospital security. Mark Coburn to come  patient's 's License tomorrow. 1915: [place] to [facility] [place] and a PCT.

## 2020-11-03 NOTE — PROGRESS NOTES
[facility] 
 
ICU TEAM Progress Note Name: Rakel Ayers : 1935 MRN: [id number] Date: 2020 Assessment/Plan: 1. Cardiac arrest 
a. Likely related to hypoxia. Patient found with irregular respiratory pattern, unresponsive, and pulseless. Code blue initiated. Chest compressions for 1 minute with ROSC- no meds given. Patient intubated. Patient moving all extremities following intubation. No Code ICE indication. b. Patient now with central line, arterial line 
c. Post code labs pending 2. Septic shock due to COVID pneumonia 
a. Patient presented following a fall at home. Found to be COVID positive 
b. Receiving vitamin C, zinc, and decadron 
c. Will switch lovenox to heparin gtt given renal dysfunction 
d. Convalescent plasma ordered 
e. Patient started on remdesivir by ID. Patient with multiple exclusion criteria for receiving medication. Will discuss with daytime pharmacist. 
f. Blood and sputum cultures pending. Urine legionella pending 
g. Continue to trend inflammatory markers 
h. Will cover for aspiration pneumonia given inability to protect airway prior to code/intubation. Continue Zosyn 
i. Continue levophed, vasopressin, and phenylephrine 3. Acute hypoxic respiratory failure c/b severe ARDS due to COVID pneumonia 
a. Patient on 100% FiO2 with PEEP 14, oxygen saturations in the 80s. 
b. Plan to start paralysis and prone 
c. Will obtain ABG following proning 
d. Lung protective ventilation 4. Severe anion gap metabolic acidosis 
a. In the setting of lactic acidosis and renal failure following code 
b. Patient has received 8 amps of bicarb and was started on a bicarb infusion 
c. Continue volume resuscitation with bicarb infusion 
d. Will given albumin 5% 25g now 5. DEREK on CKD stage IV 
a. Patient now with hyperkalemia, hyperphosphatemia, and acidosis b.  Given shock and advanced dementia, patient not a dialysis candidate which Dr. Sharan Neumann discussed with the family. 6. Diabetes 
a. Will order SSI 
 
 
F - Feeding:  Pending NPO currentl A - Analgesia: Fentanyl S - Sedation: Precedex and Versed T - DVT Prophylaxis: SCD's or Sequential Compression Device and Heparin H - Head of Bed: > 30 Degrees U - Ulcer Prophylaxis: Pepcid (famotidine) G - Glycemic Control: Insulin S - Spontaneous Breathing Trial: No 
B - Bowel Regimen: None needed at this time I - Indwelling Catheter: 
 Tubes: ETT and Orogastric Tube Lines: Peripheral IV, Arterial Line and [employer] Drains: Henderson Catheter D - De-escalation of Antibiotics: Zosyn Subjective:  
Progress Note: 11/2/2020 Reason for ICU Admission: post cardiac arrest 
 
HPI: Patient initially presented for falls at home. Found to be COVID positive. Patient with cardiac arrest likely related to hypoxia. Now in the ICU on multiple vasopressors with severe metabolic acidosis requiring paralyzation and proning. Overnight Events:  
11/2/2020 
- admitted to ICU 
 
POD: 
* No surgery found * S/P:  
 
 
Active Problem List:  
 
Problem List  Date Reviewed: 8/25/2020 Codes Class Pneumonia ICD-10-CM: J18.9 ICD-9-CM: 088 Dehydration ICD-10-CM: E86.0 ICD-9-CM: 276.51 Urinary (tract) obstruction ICD-10-CM: N13.9 ICD-9-CM: 599.60 Pre-syncope ICD-10-CM: R55 
ICD-9-CM: 780.2 DEREK (acute kidney injury) ([facility] [address] ICD-10-CM: N17.9 ICD-9-CM: 325. 9 Severe obesity (HCC) ICD-10-CM: E66.01 
ICD-9-CM: 278.01 Mood disorder ([facility] [address] ICD-10-CM: F39 
ICD-9-CM: 296.90 Cervical post-laminectomy syndrome ICD-10-CM: M96.1 ICD-9-CM: 722.81 Cervical spondylosis with myelopathy and radiculopathy ICD-10-CM: M47.12, M47.22 
ICD-9-CM: 721.1, 723.4 Spinal stenosis of lumbar region with radiculopathy ICD-10-CM: M48.061, M54.16 
ICD-9-CM: 724.02, 724.4  Diabetic peripheral neuropathy associated with type 2 diabetes mellitus ([facility] ICD-10-CM: E11.42 
ICD-9-CM: 250.60, 357.2 History of cerebral infarction ICD-10-CM: Z86.73 
ICD-9-CM: V12.54   
   
 B12 deficiency ICD-10-CM: E53.8 ICD-9-CM: 266.2 Cerebrovascular disease, unspecified ICD-10-CM: I67.9 ICD-9-CM: 437.9 Post-concussion headache ICD-10-CM: G27.923 ICD-9-CM: 339.20 Diabetes mellitus ([facility] ICD-10-CM: E11.9 ICD-9-CM: 250.00 Uncontrolled type II diabetes mellitus ([facility] ICD-10-CM: E11.65 ICD-9-CM: 250.02 Carotid stenosis, left ICD-10-CM: S35.70 
ICD-9-CM: 433.10 Nocturia ICD-10-CM: R35.1 ICD-9-CM: 788.43 Hearing loss ICD-10-CM: H91.90 ICD-9-CM: 668. 9 Vitamin D deficiency ICD-10-CM: E55.9 ICD-9-CM: 268.9 Sleep apnea ICD-10-CM: G47.30 ICD-9-CM: 780.57 Edema of both legs ICD-10-CM: R60.0 ICD-9-CM: 499. 3 Memory disturbance ICD-10-CM: R41.3 ICD-9-CM: 780.93 Gait disturbance ICD-10-CM: R26.9 ICD-9-CM: 781.2   
   
 CAD (coronary artery disease) ICD-10-CM: I25.10 ICD-9-CM: 414.00 Overview Addendum 11/30/2011  4:51 PM by MD Ashvin Cotatronic stent in Lcx 12/2008 in [place], [address] [facility] Dobutamine cardiolite 11/2011 normal perfusion, EF 53% Pure hypercholesterolemia ICD-10-CM: E78.00 ICD-9-CM: 272.0 Hypertension ICD-10-CM: I10 
ICD-9-CM: 401.9 Arthritis ICD-10-CM: M19.90 ICD-9-CM: 716.90 Anemia ICD-10-CM: D64.9 ICD-9-CM: 285.9 Past Medical History:  
 
 has a past medical history of Advance directive discussed with patient (05/13/2015,05/25/2016), Anemia (6/16/2009), Anemia NEC, Arthritis (6/16/2009), BPH (benign prostatic hyperplasia), CAD (coronary artery disease), CAD (coronary artery disease) of artery bypass graft (6/16/2009), Carotid stenosis, Chronic pain, Concussion syndrome, DM (diabetes mellitus) ([facility] [address] Edema, Gait disturbance, Glaucoma suspect (14), Headache due to trauma (2012), Headache(784.0), Hearing difficulty, Hypercholesterolemia, Hypertension (2009), Kidney stone (7/10), Microalbuminuria (2013), Mild memory disturbance (Nando Malloy also), Nasal septal deviation, Pain in back (2009), Screening for diabetic retinopathy (16), Sleep apnea, and Vitamin D deficiency. Past Surgical History:  
 
 has a past surgical history that includes pr cardiac surg procedure unlist; hx orthopaedic; hx heart catheterization; hx ptca; hx coronary stent placement; hx back surgery; hx heent; endoscopy, colon, diagnostic; hx other surgical (2012); hx other surgical (14); and hx other surgical (16). Home Medications:  
 
Prior to Admission medications Medication Sig Start Date End Date Taking? Authorizing Provider  
lisinopriL (PRINIVIL, ZESTRIL) 2.5 mg tablet Take 2.5 mg by mouth daily. Provider, Historical  
pravastatin (PravachoL) 40 mg tablet Take 40 mg by mouth nightly. Provider, Historical  
finasteride (PROSCAR) 5 mg tablet Take 5 mg by mouth daily. Provider, Historical  
silodosin (RAPAFLO) 8 mg capsule Take 8 mg by mouth daily (with breakfast). Provider, Historical  
metFORMIN (GLUMETZA ER) 500 mg TG24 24 hour tablet Take 1,000 mg by mouth daily. Provider, Historical  
 
 
Allergies/Social/Family History: No Known Allergies Social History Tobacco Use  Smoking status: Former Smoker Packs/day: 0.50 Years: 30.00 Pack years: 15.00 Types: Cigarettes Last attempt to quit: 2014 Years since quittin.3  Smokeless tobacco: Never Used Substance Use Topics  Alcohol use: No  
  Comment: occassionally Family History Problem Relation Age of Onset  Heart Disease Mother  Heart Disease Father  Cancer Sister  Dementia Sister  Cancer Brother   
     leukemia  Arthritis-osteo Brother  Migraines Brother  Migraines Child  Arthritis-osteo Child Review of Systems:  
 
Review of systems not obtained due to patient factors. Objective:  
Vital Signs: 
Visit Vitals /68 Pulse (!) 104 Temp 99.3 °F (37.4 °C) Resp 26 Ht 6' 0.01\" (1.829 m) Wt 98.6 kg (217 lb 6 oz) SpO2 (!) 80% BMI 29.47 kg/m² O2 Flow Rate (L/min): 3 l/min O2 Device: Room air Temp (24hrs), Av.8 °F (37.1 °C), Min:97.4 °F (36.3 °C), Max:101.2 °F (38.4 °C) Intake/Output:  
No intake or output data in the 24 hours ending 20 Physical Exam: 
 
General:  Sedated and on the ventilator. No acute distress. Proned Eyes:  Sclera anicteric. Pupils equal, round, reactive to light. Mouth/Throat: Orotracheal tube in place. Neck: Supple. Lungs:   Clear to auscultation bilaterally, good effort. Cardiovascular:  Regular rate and rhythm, no murmur, click, rub, or gallop. Abdomen:   Soft, non-tender, bowel sounds normal, non-distended. Extremities: No cyanosis or edema. Skin: No acute rash or lesions. Lymph Nodes: Cervical and supraclavicular normal.  
Musculoskeletal:  No swelling or deformity. Lines/Devices:  Intact, no erythema, drainage, or tenderness. Psychiatric: Sedated and appears comfortable on ventilator. LABS AND  DATA: Personally reviewed Recent Labs 20 WBC 6.2 HGB 11.7* HCT 38.3  Recent Labs 20 
1814 20 
1131 NA NOTIFIED STOLL [email or web address] 144  
K NOTIFIED STOLL [email or web address] 4.8  
CL NOTIFIED STOLL [email or web address] 111* CO2 NOTIFIED STOLL [email or web address] 21 BUN NOTIFIED STOLL [email or web address] 61* CREA NOTIFIED STOLL [email or web address] 2.55* GLU NOTIFIED STOLL [email or web address] 214* CA NOTIFIED STOLL [email or web address] 8.8 Recent Labs 20 
1814 20 
9600 AP NOTIFIED [email or web address] [email or web address] 61  
TP NOTIFIED [email or web address] [email or web address] 6.9 ALB NOTIFIED DODIE [email or web address] 3.1*  
GLOB NOTIFIED [email or web address] [email or web address] 3.8 No results for input(s): INR, PTP, APTT, INREXT in the last 72 hours. Recent Labs 11/02/20 
1727 PHI 7.19* PCO2I 24.8*  
PO2I 33* FIO2I 21 Recent Labs 11/02/20 
1814 Floyde Mu REQUESTED Hemodynamics:  
PAP:   CO:    
Wedge:   CI:    
CVP:    SVR:    
  PVR:    
 
Ventilator Settings: 
Mode Rate Tidal Volume Pressure FiO2 PEEP Assist control, Volume control   440 ml    100 % 12 cm H20 Peak airway pressure: 21 cm H2O Minute ventilation: 12.9 l/min MEDS: Reviewed Chest X-Ray: CXR Results  (Last 48 hours) 11/02/20 1839  XR CHEST PORT Final result Impression:  IMPRESSION:  
1. Increasing airspace disease. 2. ET tube projects over the airway Narrative:  INDICATION: . intubated Additional history: COMPARISON: Previous chest xray, yesterday. LIMITATIONS: Portable technique. Derril Any FINDINGS: Single frontal view of the chest.   
.  
Lines/tubes/surgical: An ET tube projects over the airway and terminates  
approximately 4 cm below the clavicular heads, above the isaias. Heart/mediastinum: Unremarkable. Lungs/pleura: Increasing patchy opacity in the right upper lobe. No visualized  
pleural effusion or pneumothorax. Additional Comments: None. .  
  
 11/01/20 1054  XR CHEST PORT Final result Impression:  IMPRESSION: Diffuse interstitial and airspace opacities suspicious for viral  
pneumonia (Covid 19) Narrative:  EXAM: XR CHEST PORT INDICATION: reported fevers, tachycardia COMPARISON: 8/30/2019 FINDINGS: A portable AP radiograph of the chest was obtained at 913 hours. The  
patient is on a cardiac monitor. Diffuse interstitial and airspace opacities. The cardiac and mediastinal contours and pulmonary vascularity are normal.  The  
bones and soft tissues are grossly within normal limits. 11/2 CXR: 1. Increasing airspace disease. 2. ET tube projects over the airway ECHO: 
8/31/19: Left Ventricle: Normal cavity size and systolic function (ejection fraction normal). Mild concentric hypertrophy. Estimated left ventricular ejection fraction is 56 - 60%. Visually measured ejection fraction. No regional wall motion abnormality noted. Multidisciplinary Rounds Completed:  Pending ABCDEF Bundle/Checklist Completed: 
Yes SPECIAL EQUIPMENT None DISPOSITION Stay in ICU CRITICAL CARE CONSULTANT NOTE I had a face to face encounter with the patient, reviewed and interpreted patient data including clinical events, labs, images, vital signs, I/O's, and examined patient. I have discussed the case and the plan and management of the patient's care with the consulting services, the bedside nurses and the respiratory therapist.   
 
NOTE OF PERSONAL INVOLVEMENT IN CARE This patient has a high probability of imminent, clinically significant deterioration, which requires the highest level of preparedness to intervene urgently. I participated in the decision-making and personally managed or directed the management of the following life and organ supporting interventions that required my frequent assessment to treat or prevent imminent deterioration. I personally spent 100 minutes of critical care time. This is time spent at this critically ill patient's bedside actively involved in patient care as well as the coordination of care and discussions with the patient's family. This does not include any procedural time which has been billed separately. JULIEN Mcfadden [facility] Critical Care 
11/2/2020

## 2020-11-03 NOTE — PROGRESS NOTES
Notified by staff that Mr Angel Holliday in [address] [facility] was being transitioned to [address] . Patient was COVID+;  did not enter patient's room. Remained outside of patient's room as he was transitioned to [address] [facility]. Staff was at bedside and family involved per Zoom. : Rev. Mine Hinton. Rosita Simental; [facility], to contact [phone] call: [phone]

## 2020-11-03 NOTE — PROGRESS NOTES
Notified by staff of Mr Selene He death. Moment of silence observed outside of patient's room. : Rev. Viv Ruelas. Shakeel Vidal; [facility], to contact [phone] [address] call: [phone]

## 2020-11-03 NOTE — PROGRESS NOTES
[facility] 
 
ICU TEAM Progress Note Name: Espinoza Gonzalez : 1935 MRN: [id number] Date: 11/3/2020 Assessment/Plan:  
 
1. S/P Cardiac arrest 
a. Likely related to hypoxia. Patient found with irregular respiratory pattern, unresponsive, and pulseless. Code blue initiated. Chest compressions for 1 minute with ROSC- no meds given. Patient intubated. Patient moved all extremities following intubation. No Code ICE indication. b. Patient now with central line, arterial line 
c. Post code labs pending 2. Septic shock due to COVID pneumonia 
a. Patient presented following a fall at home. Found to be COVID positive 
b. Receiving vitamin C, zinc, and decadron. Convalescent plasma to be given today. Received one dose of remedesivir, now d/c'd due to liver and renal dysfunction. c. Continue heparin gtt. d. Blood and sputum cultures pending. Urine legionella pending 
e. Continue to trend inflammatory markers 
f. Will cover for aspiration pneumonia given inability to protect airway prior to code/intubation. Continue Zosyn 
g. Continue levophed, vasopressin, and phenylephrine 3. Acute hypoxic respiratory failure c/b severe ARDS due to COVID pneumonia 
a. Patient on 100% FiO2 with PEEP 14, oxygen saturations in the 80s. - Wean FiO2 as tolerated per ABGs 
b. Continue nimbex, goal 2/4 twitches on train of 4. Prone 16hr, Supine 8hrs 
c. Obtain ABG today 
d. Lung protective ventilation 4. Severe anion gap metabolic acidosis 
a. In the setting of lactic acidosis and renal failure following code 
b. Conitnue bicarb infusion, change from D5% to sterile water. 5. DEREK on CKD stage IV 
a. Patient now with hyperkalemia, hyperphosphatemia, and acidosis b. Renal following. 6. Diabetes a. Give bolus of 10 units lispro and start SS Palliative medicine consulted and following. F - Feeding:  Pending NPO A - Analgesia: Fentanyl S - Sedation: Precedex and Versed T - DVT Prophylaxis: SCD's or Sequential Compression Device and Heparin H - Head of Bed: > 30 Degrees U - Ulcer Prophylaxis: Pepcid (famotidine) G - Glycemic Control: Insulin SSI 
S - Spontaneous Breathing Trial: No 
B - Bowel Regimen: None needed at this time I - Indwelling Catheter: 
 Tubes: ETT and Orogastric Tube Lines: Peripheral IV, Arterial Line and [employer] Drains: Henderson Catheter D - De-escalation of Antibiotics: Zosyn Subjective:  
Progress Note: 11/3/2020 Reason for ICU Admission: post cardiac arrest 
 
HPI: Patient initially presented for falls at home. Found to be COVID positive. Patient with cardiac arrest likely related to hypoxia. Now in the ICU on multiple vasopressors with severe metabolic acidosis requiring paralyzation and proning. Overnight Events:  
11/3/2020 
- some improvement on pressor requirements. - lactic acid starting to trend down - Palliative consulted and plan to contact family today. - continue supportive care - Update daughter Rosalia Rodriguez on patient status POD: 
* No surgery found * S/P:  
 
 
Active Problem List:  
 
Problem List  Date Reviewed: 8/25/2020 Codes Class Pneumonia ICD-10-CM: J18.9 ICD-9-CM: 043 Dehydration ICD-10-CM: E86.0 ICD-9-CM: 276.51 Urinary (tract) obstruction ICD-10-CM: N13.9 ICD-9-CM: 599.60 Pre-syncope ICD-10-CM: R55 
ICD-9-CM: 780.2 DEREK (acute kidney injury) ([facility] ICD-10-CM: N17.9 ICD-9-CM: 162. 9 Severe obesity (HCC) ICD-10-CM: E66.01 
ICD-9-CM: 278.01 Mood disorder ([facility] ICD-10-CM: F39 
ICD-9-CM: 296.90 Cervical post-laminectomy syndrome ICD-10-CM: M96.1 ICD-9-CM: 722.81 Cervical spondylosis with myelopathy and radiculopathy ICD-10-CM: M47.12, M47.22 
ICD-9-CM: 721.1, 723.4 Spinal stenosis of lumbar region with radiculopathy ICD-10-CM: M48.061, M54.16 
ICD-9-CM: 724.02, 724.4 Diabetic peripheral neuropathy associated with type 2 diabetes mellitus (HCC) ICD-10-CM: E11.42 
ICD-9-CM: 250.60, 357.2 History of cerebral infarction ICD-10-CM: Z86.73 
ICD-9-CM: V12.54   
   
 B12 deficiency ICD-10-CM: E53.8 ICD-9-CM: 266.2 Cerebrovascular disease, unspecified ICD-10-CM: I67.9 ICD-9-CM: 437.9 Post-concussion headache ICD-10-CM: I79.451 ICD-9-CM: 339.20 Diabetes mellitus ([facility] ICD-10-CM: E11.9 ICD-9-CM: 250.00 Uncontrolled type II diabetes mellitus ([facility] ICD-10-CM: E11.65 ICD-9-CM: 250.02 Carotid stenosis, left ICD-10-CM: U14.92 
ICD-9-CM: 433.10 Nocturia ICD-10-CM: R35.1 ICD-9-CM: 788.43 Hearing loss ICD-10-CM: H91.90 ICD-9-CM: 791. 9 Vitamin D deficiency ICD-10-CM: E55.9 ICD-9-CM: 268.9 Sleep apnea ICD-10-CM: G47.30 ICD-9-CM: 780.57 Edema of both legs ICD-10-CM: R60.0 ICD-9-CM: 412. 3 Memory disturbance ICD-10-CM: R41.3 ICD-9-CM: 780.93 Gait disturbance ICD-10-CM: R26.9 ICD-9-CM: 781.2   
   
 CAD (coronary artery disease) ICD-10-CM: I25.10 ICD-9-CM: 414.00 Overview Addendum 11/30/2011  4:51 PM by Siomara Brar MD  
  Medlamont stent in Lcx 12/2008 in [facility] Dobutamine cardiolite 11/2011 normal perfusion, EF 53% Pure hypercholesterolemia ICD-10-CM: E78.00 ICD-9-CM: 272.0 Hypertension ICD-10-CM: I10 
ICD-9-CM: 401.9 Arthritis ICD-10-CM: M19.90 ICD-9-CM: 716.90 Anemia ICD-10-CM: D64.9 ICD-9-CM: 285.9 Past Medical History:  
 
 has a past medical history of Advance directive discussed with patient (05/13/2015,05/25/2016), Anemia (6/16/2009), Anemia NEC, Arthritis (6/16/2009), BPH (benign prostatic hyperplasia), CAD (coronary artery disease), CAD (coronary artery disease) of artery bypass graft (6/16/2009), Carotid stenosis, Chronic pain, Concussion syndrome, DM (diabetes mellitus) ([facility] Edema, Gait disturbance, Glaucoma suspect (14), Headache due to trauma (2012), Headache(784.0), Hearing difficulty, Hypercholesterolemia, Hypertension (2009), Kidney stone (7/10), Microalbuminuria (2013), Mild memory disturbance ([place] loredo), Nasal septal deviation, Pain in back (2009), Screening for diabetic retinopathy (16), Sleep apnea, and Vitamin D deficiency. Past Surgical History:  
 
 has a past surgical history that includes pr cardiac surg procedure unlist; hx orthopaedic; hx heart catheterization; hx ptca; hx coronary stent placement; hx back surgery; hx heent; endoscopy, colon, diagnostic; hx other surgical (2012); hx other surgical (14); and hx other surgical (16). Home Medications:  
 
Prior to Admission medications Medication Sig Start Date End Date Taking? Authorizing Provider  
lisinopriL (PRINIVIL, ZESTRIL) 2.5 mg tablet Take 2.5 mg by mouth daily. Provider, Historical  
pravastatin (PravachoL) 40 mg tablet Take 40 mg by mouth nightly. Provider, Historical  
finasteride (PROSCAR) 5 mg tablet Take 5 mg by mouth daily. Provider, Historical  
silodosin (RAPAFLO) 8 mg capsule Take 8 mg by mouth daily (with breakfast). Provider, Historical  
metFORMIN (GLUMETZA ER) 500 mg TG24 24 hour tablet Take 1,000 mg by mouth daily. Provider, Historical  
 
 
Allergies/Social/Family History: No Known Allergies Social History Tobacco Use  Smoking status: Former Smoker Packs/day: 0.50 Years: 30.00 Pack years: 15.00 Types: Cigarettes Last attempt to quit: 2014 Years since quittin.3  Smokeless tobacco: Never Used Substance Use Topics  Alcohol use: No  
  Comment: occassionally Family History Problem Relation Age of Onset  Heart Disease Mother  Heart Disease Father  Cancer Sister  Dementia Sister  Cancer Brother   
     leukemia  Arthritis-osteo Brother  Migraines Brother  Migraines Child  Arthritis-osteo Child Review of Systems:  
 
Review of systems not obtained due to patient factors. Objective:  
Vital Signs: 
Visit Vitals /71 Pulse 64 Temp 97.4 °F (36.3 °C) Resp 28 Ht 6' 0.01\" (1.829 m) Wt 98.6 kg (217 lb 6 oz) SpO2 100% BMI 29.47 kg/m² O2 Flow Rate (L/min): 3 l/min O2 Device: Endotracheal tube, Ventilator Temp (24hrs), Av.2 °F (36.2 °C), Min:96.6 °F (35.9 °C), Max:97.4 °F (36.3 °C) Intake/Output:  
 
Intake/Output Summary (Last 24 hours) at 11/3/2020 1512 Last data filed at 11/3/2020 1500 Gross per 24 hour Intake 7124.81 ml Output 70 ml Net 7054.81 ml Physical Exam: 
 
General:  Intubated, sedated and paralyzed Proned Eyes:  Sclera anicteric. Pupils equal, round, sluggish to light. Mouth/Throat: Orotracheal tube in place. Neck: Supple. Lungs:   Clear to auscultation bilaterally, good effort. Cardiovascular:  Regular rate and rhythm, no murmur, click, rub, or gallop. Abdomen:   Soft, non-tender, bowel sounds normal, non-distended. Extremities: No cyanosis or edema. Skin: No acute rash or lesions. Lymph Nodes: Cervical and supraclavicular normal.  
Musculoskeletal:  No swelling or deformity. Lines/Devices:  Intact, no erythema, drainage, or tenderness. Psychiatric: Unable to assess at this time. Paralyzed LABS AND  DATA: Personally reviewed Recent Labs 20 
03220 WBC 14.8* 11.3* HGB 11.1* 11.6* HCT 37.8 39.8 * 202 Recent Labs 20 
1324 20 
0320 20 
0217 202  145 147* 146*  
K 5.5* 5.5* 5.6* 6.7*  
 106 109* 111* CO2 25 19* 19* 12*  
BUN 83* 75* 73* 73* CREA 4.37* 3.60* 3.63* 3.74* * 351* 313* 156* CA 6.7* 7.3* 7.8* 8.1*  
MG  --   --   --  3.0*  
PHOS  --   --  8.5* 9.8* Recent Labs 20 
0320 20 
0217 20 1814  
AP 71  --  NOTIFIED DODIE [email or web address]  
TP 5.9*  --  NOTIFIED STOLL [email or web address]  
ALB 2.7* 2.1* NOTIFIED STOLLBARD [email or web address]  
GLOB 3.2  --  NOTIFIED STOLL [email or web address] Recent Labs 11/03/20 
1324 11/03/20 
0320 11/02/20 
2212 INR  --   --  1.5* PTP  --   --  15.1* APTT 44.4* 40.2* 39.8* Recent Labs 11/02/20 
1727 PHI 7.19* PCO2I 24.8*  
PO2I 33* FIO2I 21 Recent Labs 11/03/20 
0320 11/02/20 
2212 TROIQ 1.80* 1.38* Hemodynamics:  
PAP:   CO:    
Wedge:   CI:    
CVP:    SVR:    
  PVR:    
 
Ventilator Settings: 
Mode Rate Tidal Volume Pressure FiO2 PEEP Assist control, Volume control   440 ml    100 % 12 cm H20 Peak airway pressure: 30 cm H2O Minute ventilation: 12.5 l/min MEDS: Reviewed Chest X-Ray: CXR Results  (Last 48 hours) 11/02/20 1839  XR CHEST PORT Final result Impression:  IMPRESSION:  
1. Increasing airspace disease. 2. ET tube projects over the airway Narrative:  INDICATION: . intubated Additional history: COMPARISON: Previous chest xray, yesterday. LIMITATIONS: Portable technique. Dave Llanes FINDINGS: Single frontal view of the chest.   
.  
Lines/tubes/surgical: An ET tube projects over the airway and terminates  
approximately 4 cm below the clavicular heads, above the isaias. Heart/mediastinum: Unremarkable. Lungs/pleura: Increasing patchy opacity in the right upper lobe. No visualized  
pleural effusion or pneumothorax. Additional Comments: None. .  
  
  
 
11/2 CXR: 1. Increasing airspace disease. 2. ET tube projects over the airway ECHO: 
8/31/19: Left Ventricle: Normal cavity size and systolic function (ejection fraction normal). Mild concentric hypertrophy. Estimated left ventricular ejection fraction is 56 - 60%. Visually measured ejection fraction. No regional wall motion abnormality noted. Multidisciplinary Rounds Completed:  Pending ABCDEF Bundle/Checklist Completed: 
Yes SPECIAL EQUIPMENT None DISPOSITION Stay in ICU CRITICAL CARE CONSULTANT NOTE I had a face to face encounter with the patient, reviewed and interpreted patient data including clinical events, labs, images, vital signs, I/O's, and examined patient. I have discussed the case and the plan and management of the patient's care with the consulting services, the bedside nurses and the respiratory therapist.   
 
NOTE OF PERSONAL INVOLVEMENT IN CARE This patient has a high probability of imminent, clinically significant deterioration, which requires the highest level of preparedness to intervene urgently. I participated in the decision-making and personally managed or directed the management of the following life and organ supporting interventions that required my frequent assessment to treat or prevent imminent deterioration. I personally spent 60 minutes of critical care time. This is time spent at this critically ill patient's bedside actively involved in patient care as well as the coordination of care and discussions with the patient's family. This does not include any procedural time which has been billed separately. Jamil Salguero [facility] Critical Care Medicine [facility]

## 2020-11-03 NOTE — PROCEDURES
[facility] Procedure Note - Arterial Access:  
Performed by Park Wood NP. Immediately prior to the procedure, the patient was reevaluated and found suitable for the planned procedure and any planned medications. Immediately prior to the procedure a time out was called to verify the correct patient, procedure, equipment, staff, and marking as appropriate. Central line Bundle: 
Full sterile barrier precautions used. 5 mL 1% Lidocaine placed at insertion site. Insertion Date: 11/2/20 Time:2200 Procedure Location:  ICU. Condition: Emergency. Consent:  YES. Method: Seldinger technique. Site Prep: ChloraPrep. Procedure: Arterial Catheter Insertion in Right, Radial Artery Catheter inserted into a new site. Number of Attempts:  2 Indication: Monitoring and Blood Drawing. There was bright red, pulsatile blood return. Femoral Site? no. If Yes, reason femoral site was chosen:  
Catheter secured. Biopatch in place? yes. Sterile Bio-occlusive dressing placed. Complication None. The procedure was tolerated well. Park Wood NP Critical Care Medicine [facility]

## 2020-11-03 NOTE — PROGRESS NOTES
Spiritual Care Assessment/Progress Note [address] 
 
 
NAME: Gisela Chi      MRN: [id number] AGE: 80 y.o. SEX: male Yarsani Affiliation: Pentecostal  
Language: English  
 
11/3/2020     Total Time (in minutes): 12 Spiritual Assessment begun in [facility] 7S1 INTENSIVE CARE through conversation with: 
  
    []Patient        [] Family    [] Friend(s) Reason for Consult: Palliative Care, Initial/Spiritual Assessment Spiritual beliefs: (Please include comment if needed) [x] Identifies with a jim tradition:     
   [] Supported by a jim community:        
   [] Claims no spiritual orientation:       
   [] Seeking spiritual identity:            
   [] Adheres to an individual form of spirituality:       
   [] Not able to assess:                   
 
    
Identified resources for coping:  
   [] Prayer                           
   [] Music                  [] Guided Imagery [x] Family/friends                 [] Pet visits [] Devotional reading                         [] Unknown 
   [] Other:                                         
 
 
Interventions offered during this visit: (See comments for more details) Patient Interventions: Crisis Plan of Care: 
 
 [x] Support spiritual and/or cultural needs  
 [] Support AMD and/or advance care planning process    
 [] Support grieving process 
 [] Coordinate Rites and/or Rituals  
 [] Coordination with community clergy [] No spiritual needs identified at this time 
 [] Detailed Plan of Care below (See Comments)  [] Make referral to Music Therapy 
[] Make referral to Pet Therapy    
[] Make referral to Addiction services 
[] Make referral to [facility] 
[] Make referral to Spiritual Care Partner 
[] No future visits requested       
[x] Follow up visits as needed Comments:  visit per palliative consult as well as follow up. Pt has covid contact precautions at this time.   spoke with care team, intensvist contacted pt's daughter and talked with her.  was able to get update from care team. Please contact [phone] [facility] for further support.  follow up as needed. [address] Elena Carlos, [facility] 
 [phone])

## 2020-11-03 NOTE — PROGRESS NOTES
157 [facility] went into room with respiratory therapist to assist with ABG. Patient breathing did not appear normal, patient was puckering, breathing was shallow and he appeared to be losing consciousness. Patient did not respond to stimuli. This was a change in condition from previous hour. We were unable to assess O2 sats or feel a pulse. Chest compressions began by myself with Francisca MAGALLON bagging. Code blue was called. 1746 patient with identified pulse, compressions ended 1750 patient was intubated 1752 suctioned 1753 soft wrist restraints applied, patient started to move fingers, hand, unable to obtain labs or O2 sat 
 
1800 EKG ordered, completed. 1807 bolus ordered, blood sugar checked.  
 
1806 bolus cancelled. 1829 ativan and fentanyl given [phone] patient to be transported to ICU bed 2, report called and belongings packed. Patient transported with CCU RN's.  
 
1850 CODE BLUE resolved.

## 2020-11-03 NOTE — DISCHARGE SUMMARY
[facility] Discharge Summary Patient: Yomi Eduardo MRN: [id number] YOB: 1935 Age: 80 y.o. Date of admission:  2020 Date of discharge:  11/3/2020 Primary care provider:  Gurjit Mireles MD  
 
Admitting provider:  David Felton MD 
 
Discharging provider(s): Alyson Cevallos, NP-C - Staff [address] Consultations · IP CONSULT TO INFECTIOUS DISEASES 
· IP CONSULT TO NEPHROLOGY · IP CONSULT TO NEPHROLOGY · IP CONSULT TO PALLIATIVE CARE - PROVIDER 
· IP CONSULT TO PULMONOLOGY Procedures · Intubated 20 · CVL placement 20 Discharge condition: . The patient is stable for discharge. Admission diagnosis · Pneumonia [J18.9] Please refer to the admission history and physical for details on the presenting problem. Final discharge diagnoses and brief hospital course · S/p PEA Cardiac arrest 
· Septic shock due to COVID pneumonia · Acute hypoxic respiratory failure c/b severe ARDS due to COVID pneumonia · Severe anion gap metabolic acidosis · DEREK on CKD stage IV · Advanced dementia · CAD · DM2 Yomi Eduardo is a 80 y.o. male who presented to the emergency room by EMS after falling at home. It was reported that patient has been falling twice at home trying to get out of bed last night. Fever reported by family, unclear if the fever was recorded. Upon evaluation in the ER patient was tachycardic and febrile. Pelvic x-ray and the right hip x-ray demonstrated no acute abnormality. CT head and CT C-spine shows no acute pathology. Chest x-ray shows diffuse interstitial and airspace opacities suspicious for viral pneumonia. Patient was on RA on admission. Admitted to hospitalist service and  on room air.  Tested for COVID-19 which tested positive. Started on therapies including abx, Vit C, zinc, and steroids. On 20 patient had a cardiac arrest likely due to hypoxic respiratory arrest. Intubated by anesthesia and transferred to ICU. CVL was placed emergently. ID following for COVID. Started on remedesiver and convalescent plasma. Proned for hypoxia. Profound lactic acidosis and renal failure. Nephrology consulted, not a good candidate for dialysis. Palliative care was consulted and followed with family. Family decided to transition patient to comfort measures with compassionate extubation. Video conference with family prior to transition. Patient passed after transition to comfort measures only. Patient  20 at [phone]. Pastoral care notified. Recent Labs 20 
1020 WBC 14.8* 11.3* 6.2 HGB 11.1* 11.6* 11.7* HCT 37.8 39.8 38.3 * 202 183 Recent Labs 20 
1324 20  145 147* 146*  
K 5.5* 5.5* 5.6* 6.7*  
 106 109* 111* CO2 25 19* 19* 12*  
BUN 83* 75* 73* 73* CREA 4.37* 3.60* 3.63* 3.74* * 351* 313* 156* CA 6.7* 7.3* 7.8* 8.1*  
MG  --   --   --  3.0*  
PHOS  --   --  8.5* 9.8* Recent Labs 20 
0320 20 
1814 20 
9354 AP 71  --  NOTIFIED DODIE [email or web address] 61  
TP 5.9*  --  NOTIFIED DODIE [email or web address] 6.9 ALB 2.7* 2.1* NOTIFIED DODIE [email or web address] 3.1*  
GLOB 3.2  --  NOTIFIED DODIE [email or web address] 3.8 Recent Labs 20 
1324 20 INR  --   --  1.5* PTP  --   --  15.1* APTT 44.4* 40.2* 39.8* Recent Labs 20 FERR 76,260* No results for input(s): PH, PCO2, PO2 in the last 72 hours. No results for input(s): CPK, CKMB in the last 72 hours. No lab exists for component: TROPONINI No components found for: [place] Pertinent imaging studies: CXR Results  (Last 48 hours) 11/02/20 1839  XR CHEST PORT Final result Impression:  IMPRESSION:  
1. Increasing airspace disease. 2. ET tube projects over the airway Narrative:  INDICATION: . intubated Additional history: COMPARISON: Previous chest xray, yesterday. LIMITATIONS: Portable technique. Seb Menezes FINDINGS: Single frontal view of the chest.   
.  
Lines/tubes/surgical: An ET tube projects over the airway and terminates  
approximately 4 cm below the clavicular heads, above the isaias. Heart/mediastinum: Unremarkable. Lungs/pleura: Increasing patchy opacity in the right upper lobe. No visualized  
pleural effusion or pneumothorax. Additional Comments: None. .  
  
  
 
11/01/20 CT head w/o contrast 
Study Result EXAM: Head CT without Contrast: 
  
TECHNIQUE: Unenhanced CT Head is performed. CT dose reduction was achieved 
through use of a standardized protocol tailored for this examination and 
automatic exposure control for dose modulation. 
  
INDICATION:  GLF hx dementia 
  
FINDINGS: There is no apparent mass, and no bleed, shift, obstructive 
hydrocephalus or significant extra-axial fluid collection. There is a remote 
appearing left frontal lobe infarct. Regions of parenchymal hypodensity are 
present, nonspecific, but favoring chronic microangiopathy-although difficult to 
exclude concomitant acute ischemia. Bone windows are unremarkable. 
  
IMPRESSION IMPRESSION: No CT apparent acute intracranial finding. Chronic ischemic 
findings. CT Spine Cerv wo cont Study Result INDICATION: GLF  
  
EXAM: Axial unenhanced CT of the cervical spine is performed with 2D coronal and 
sagittal reformatted images provided. CT dose reduction was achieved through use 
of a standardized protocol tailored for this examination and automatic exposure 
control for dose modulation. 
  
FINDINGS: There is no fracture or significant subluxation. There is multilevel degenerative disc disease. There is prior multilevel surgical posterior 
decompression. There is no prevertebral soft tissue swelling. Visualized thyroid 
and neck soft tissues are unremarkable for age. 
  
IMPRESSION IMPRESSION: No fracture. 
   
 
--------------------------------- Chronic Diagnoses:   
Problem List as of 11/3/2020 Date Reviewed: 8/25/2020 Codes Class Noted - Resolved Pneumonia ICD-10-CM: J18.9 ICD-9-CM: 918  11/1/2020 - Present Dehydration ICD-10-CM: E86.0 ICD-9-CM: 276.51  9/11/2019 - Present Urinary (tract) obstruction ICD-10-CM: N13.9 ICD-9-CM: 599.60  9/11/2019 - Present Pre-syncope ICD-10-CM: R55 
ICD-9-CM: 780.2  8/30/2019 - Present DEREK (acute kidney injury) ([facility] ICD-10-CM: N17.9 ICD-9-CM: 584.9  8/30/2019 - Present Severe obesity ([facility] ICD-10-CM: E66.01 
ICD-9-CM: 278.01  2/25/2019 - Present Mood disorder [facility]) ICD-10-CM: F39 
ICD-9-CM: 296.90  7/16/2018 - Present Cervical post-laminectomy syndrome ICD-10-CM: M96.1 ICD-9-CM: 722.81  10/6/2015 - Present Cervical spondylosis with myelopathy and radiculopathy ICD-10-CM: M47.12, M47.22 
ICD-9-CM: 721.1, 723.4  10/6/2015 - Present Spinal stenosis of lumbar region with radiculopathy ICD-10-CM: M48.061, M54.16 
ICD-9-CM: 724.02, 724.4  10/6/2015 - Present Diabetic peripheral neuropathy associated with type 2 diabetes mellitus ([facility] ICD-10-CM: E11.42 
ICD-9-CM: 250.60, 357.2  10/6/2015 - Present History of cerebral infarction ICD-10-CM: Z86.73 
ICD-9-CM: V12.54  10/6/2015 - Present B12 deficiency ICD-10-CM: E53.8 ICD-9-CM: 266.2  7/13/2015 - Present Cerebrovascular disease, unspecified ICD-10-CM: I67.9 ICD-9-CM: 437.9  7/13/2015 - Present Post-concussion headache ICD-10-CM: T60.004 ICD-9-CM: 339.20  7/13/2015 - Present Diabetes mellitus ([facility] ICD-10-CM: E11.9 ICD-9-CM: 250.00  7/10/2013 - Present Uncontrolled type II diabetes mellitus ([facility] ICD-10-CM: E11.65 ICD-9-CM: 250.02  2/14/2013 - Present Carotid stenosis, left ICD-10-CM: U09.73 
ICD-9-CM: 433.10  2/13/2013 - Present Nocturia ICD-10-CM: R35.1 ICD-9-CM: 788.43  2/13/2013 - Present Hearing loss ICD-10-CM: H91.90 ICD-9-CM: 389.9  2/13/2013 - Present Vitamin D deficiency ICD-10-CM: E55.9 ICD-9-CM: 268.9  2/13/2013 - Present Sleep apnea ICD-10-CM: G47.30 ICD-9-CM: 780.57  2/13/2013 - Present Edema of both legs ICD-10-CM: R60.0 ICD-9-CM: 782.3  2/13/2013 - Present Memory disturbance ICD-10-CM: R41.3 ICD-9-CM: 780.93  2/13/2013 - Present Gait disturbance ICD-10-CM: R26.9 ICD-9-CM: 781.2  2/13/2013 - Present CAD (coronary artery disease) ICD-10-CM: I25.10 ICD-9-CM: 414.00  Unknown - Present Overview Addendum 11/30/2011  4:51 PM by Juan Diego Carrillo MD  
  Medtronic stent in Lcx 12/2008 in [facility] Dobutamine cardiolite 11/2011 normal perfusion, EF 53% Pure hypercholesterolemia ICD-10-CM: E78.00 ICD-9-CM: 272.0  12/7/2009 - Present Hypertension ICD-10-CM: I10 
ICD-9-CM: 401.9  6/16/2009 - Present Arthritis ICD-10-CM: M19.90 ICD-9-CM: 716.90  6/16/2009 - Present Anemia ICD-10-CM: D64.9 ICD-9-CM: 285.9  6/16/2009 - Present RESOLVED: Lumbosacral radiculopathy due to degenerative joint disease of spine ICD-10-CM: M47.27 ICD-9-CM: 722.52  10/6/2015 - 5/25/2016 RESOLVED: Type II or unspecified type diabetes mellitus with neurological manifestations, not stated as uncontrolled(250.60) ([facility] ICD-10-CM: E11.49 
ICD-9-CM: 250.60  7/13/2015 - 5/25/2016 RESOLVED: Polyneuropathy in diabetes(357.2) ICD-10-CM: E11.42 
ICD-9-CM: 357.2  7/13/2015 - 5/25/2016 RESOLVED: Post concussion syndrome ICD-10-CM: F07.81 ICD-9-CM: 310.2  7/13/2015 - 5/25/2016  RESOLVED: Other complicated headache syndrome ICD-10-CM: G44.59 
 ICD-9-CM: 339.44  5/13/2015 - 5/25/2016 RESOLVED: Obesity, unspecified ICD-10-CM: E66.9 ICD-9-CM: 278.00  2/13/2013 - 7/16/2018 RESOLVED: Concussion syndrome ICD-10-CM: F07.81 ICD-9-CM: 310.2  2/13/2013 - 5/25/2016 RESOLVED: Chronic LBP ICD-10-CM: M54.5, G89.29 ICD-9-CM: 724.2, 338.29  8/20/2010 - 2/13/2013 Overview Addendum 5/20/2011  9:54 PM by Judith Presley MD  
  Steroid injection in the back helped RESOLVED: Calf pain ICD-10-CM: Z62.417 ICD-9-CM: 729.5  8/20/2010 - 2/13/2013 RESOLVED: Diabetes mellitus type II, controlled ([facility] ICD-10-CM: E11.9 ICD-9-CM: 250.00  6/16/2009 - 2/14/2013 RESOLVED: CAD (coronary artery disease) of artery bypass graft ICD-10-CM: I25.810 ICD-9-CM: 414.05  6/16/2009 - 11/18/2010 RESOLVED: Pain in back (Chronic) ICD-10-CM: M54.9 ICD-9-CM: 724.5  6/16/2009 - 5/25/2016 Signed:  
 
 CAREN [facility] Staff C/ [address]  
 11/3/2020 
 6:54 PM 
 
Isabella Carlos DO Attending Cc: Noe Kay MD

## 2020-11-03 NOTE — CONSULTS
.. 
   
 
            
[address] [facility] Lana Stapleton YOB: 1935 Assessment & Plan:  
IMP: 
DEREK CRITICAL HYPERKALEMIA CRITICAL M. ACIDOSIS 
SEPTIC SHOCK COVID-19 PNA ACUTE RESP FAILURE ANEMIA PLAN: 
I HAD A LONG DISCUSSION WITH PT'S DAUGHTER WHO WORKED IN [employer] PREVIOUSLY. SHE SAID HER FATHER NEVER MADE A DECISION REGARDING END OF LIFE CARE AND BASICALLY HAD SAID HE WANTED FULL COURT PRESS. SHE WAS TRYING TO RESPECT HIS WISHES. THIS IS IN THE SETTING OF ADVANCED VASCULAR DEMENTIA. I EXPLAINED TO HER THAT IN THE SETTING OF ADVANCED DEMENTIA WE DON'T NORMALLY DO ANY DIALYSIS BECAUSE THE THERAPY ONLY INCREASES SUFFERING. I ALSO EXPLAINED THAT EVEN IF WE WANTED TO DO THE DIALYSIS THAT A SYSTOLIC BP IN THE 70'Q GOING ON A 4TH PRESSOR WOULDN'T EVEN TOLERATE CRRT. I TOLD HER THAT SADLY HER FATHER WAS DYING AND THAT A CHANGE IN GOALS OF CARE WOULD PROVIDE HER FATHER WITH COMFORT. I ASKED HER TO SPEAK WITH HER FAMILY TO CONTINUE VENTILATOR AND PRESSOR SUPPORT BUT NO ESCALATION TO ANY DIALYSIS AND NO FURTHER CHEST COMPRESSIONS OR SHOCKING. SHE HUNG UP AND WAS GOING TO CALL HER FAMILY. I NEXT INFORMED THE ICU TEAM (Analia Holly, BILL). Subjective: CHIEF COMPLAINT: DEREK ON CKD3 HPI: 
PT IS COVID-19+ AND NO DIRECT CONTACT WITH PT WAS MADE BASED ON ASN AND RPA GUIDELINES TO REDUCE THE SPREAD OF COVID-19 AND MINIMIZE USE OF PPE. PT IS AN UNFORTUNATE 79 YO M WITH A PMH SIG FOR CKD STAGE 3 FOLLOWED BY MY COLLEAGUE, DR. Easton Alexander WITH RNA, IN THE SETTING OF DIFFUSE ATHEROSCLEROTIC DISEASE (S/P CABG, HAS CAROTID DZ BILATERALLY, HAS HCT WITH VASCULAR CHANGES), DM2, HTN WHO WE ARE ASKED TO SEE FOR CRITICAL HYPERKALEMIA AND DEREK S/P A CODE BLUE. HE CAME IN WITH FALLS X 2 AND FEVER. CXR WAS ABNORMAL AND COVID WAS +. INITIALLY HE WAS STABLE BUT THAT CHANGED TODAY. THE CODE WAS ~ 1 MIN.  CURRENTLY HE IS INTUBATED IN THE ICU ON 3 PRESSORS WITH A SYSTOLIC BP IN THE 20'U ABOUT TO START EPI. Review of Systems UNABLE TO OBTAIN Past Medical History:  
Diagnosis Date  Advance directive discussed with patient 05/13/2015,05/25/2016  Anemia 6/16/2009  Anemia NEC  Arthritis 6/16/2009  BPH (benign prostatic hyperplasia)  CAD (coronary artery disease)  CAD (coronary artery disease) of artery bypass graft 6/16/2009  
 dr faye Gates or associate Dr Arya Ochoa  Carotid stenosis  Chronic pain   
 back  Concussion syndrome  DM (diabetes mellitus) ([facility]  Edema  Gait disturbance  Glaucoma suspect 12/04/14 VEI notes Freeda Rob  Headache due to trauma 1/2012  
 hit by a device on a door  Headache(784.0)  Hearing difficulty  Hypercholesterolemia  Hypertension 6/16/2009  Kidney stone 7/10  
 dr Josemanuel styles [facility] lithotripsy  Microalbuminuria 2/2013  Mild memory disturbance Arben Guajardo also  
 neuro associates Alisia Mckenna  Nasal septal deviation  Pain in back 6/16/2009  Screening for diabetic retinopathy 7/18/16  
 exam neg for retinopathy Stiven'/Faulkner  Sleep apnea  Vitamin D deficiency Past Surgical History:  
Procedure Laterality Date  CARDIAC SURG PROCEDURE UNLIST    
 stent 2008  ENDOSCOPY, COLON, DIAGNOSTIC    
 in [place]- pt thinks in 2006  HX BACK SURGERY    
 HX CORONARY STENT PLACEMENT    
 HX HEART CATHETERIZATION    
 HX HEENT    
 left cataract  HX ORTHOPAEDIC    
 back multiple times  HX OTHER SURGICAL  12/2012  
 temporal  artery bx  HX OTHER SURGICAL  4/23/14  
 lower ext doppler  HX OTHER SURGICAL  4/1/16  
 echo report EF 60%  HX PTCA Social History Socioeconomic History  Marital status:  Spouse name: Not on file  Number of children: Not on file  Years of education: Not on file  Highest education level: Not on file Occupational History  Not on file Social Needs  Financial resource strain: Not on file  Food insecurity Worry: Not on file Inability: Not on file  Transportation needs Medical: Not on file Non-medical: Not on file Tobacco Use  Smoking status: Former Smoker Packs/day: 0.50 Years: 30.00 Pack years: 15.00 Types: Cigarettes Last attempt to quit: 2014 Years since quittin.3  Smokeless tobacco: Never Used Substance and Sexual Activity  Alcohol use: No  
  Comment: occassionally  Drug use: No  
 Sexual activity: Yes  
  Partners: Female Birth control/protection: None Lifestyle  Physical activity Days per week: Not on file Minutes per session: Not on file  Stress: Not on file Relationships  Social connections Talks on phone: Not on file Gets together: Not on file Attends Mandaen service: Not on file Active member of club or organization: Not on file Attends meetings of clubs or organizations: Not on file Relationship status: Not on file  Intimate partner violence Fear of current or ex partner: Not on file Emotionally abused: Not on file Physically abused: Not on file Forced sexual activity: Not on file Other Topics Concern  Not on file Social History Narrative  Not on file Family History Problem Relation Age of Onset  Heart Disease Mother  Heart Disease Father  Cancer Sister  Dementia Sister  Cancer Brother   
     leukemia  Arthritis-osteo Brother  Migraines Brother  Migraines Child  Arthritis-osteo Child Prior to Admission medications Medication Sig Start Date End Date Taking? Authorizing Provider  
lisinopriL (PRINIVIL, ZESTRIL) 2.5 mg tablet Take 2.5 mg by mouth daily. Provider, Historical  
pravastatin (PravachoL) 40 mg tablet Take 40 mg by mouth nightly. Provider, Historical  
finasteride (PROSCAR) 5 mg tablet Take 5 mg by mouth daily.     Provider, Historical  
 silodosin (RAPAFLO) 8 mg capsule Take 8 mg by mouth daily (with breakfast). Provider, Historical  
metFORMIN (GLUMETZA ER) 500 mg TG24 24 hour tablet Take 1,000 mg by mouth daily. Provider, Historical  
 
No Known Allergies Objective:  
 
Vitals: 
Blood pressure (!) 104/46, pulse (!) 102, temperature 99.3 °F (37.4 °C), resp. rate 21, height 6' 0.01\" (1.829 m), weight 98.6 kg (217 lb 6 oz), SpO2 94 %. Temp (24hrs), Av.2 °F (36.8 °C), Min:97.4 °F (36.3 °C), Max:99.3 °F (37.4 °C) Intake and Output: 
No intake/output data recorded. No intake/output data recorded. Physical Exam:              
 Patient is intubated:  YES Physical Examination:  
NO EXAM 2ND TO PT IS COVID 19+   
ECG/rhythm[de-identified] Rev:YES 
Xray/CT/US/MRI REV:YES Data Review Recent Results (from the past 72 hour(s)) CBC WITH AUTOMATED DIFF Collection Time: 20  5:49 AM  
Result Value Ref Range WBC 6.2 4.1 - 11.1 K/uL  
 RBC 4.13 4.10 - 5.70 M/uL  
 HGB 11.7 (L) 12.1 - 17.0 g/dL HCT 38.3 36.6 - 50.3 % MCV 92.7 80.0 - 99.0 FL  
 MCH 28.3 26.0 - 34.0 PG  
 MCHC 30.5 30.0 - 36.5 g/dL  
 RDW 13.4 11.5 - 14.5 % PLATELET 954 659 - 273 K/uL MPV 10.2 8.9 - 12.9 FL  
 NRBC 0.0 0  WBC ABSOLUTE NRBC 0.00 0.00 - 0.01 K/uL NEUTROPHILS 77 (H) 32 - 75 % LYMPHOCYTES 16 12 - 49 % MONOCYTES 7 5 - 13 % EOSINOPHILS 0 0 - 7 % BASOPHILS 0 0 - 1 % IMMATURE GRANULOCYTES 0 0.0 - 0.5 % ABS. NEUTROPHILS 4.8 1.8 - 8.0 K/UL  
 ABS. LYMPHOCYTES 1.0 0.8 - 3.5 K/UL  
 ABS. MONOCYTES 0.4 0.0 - 1.0 K/UL  
 ABS. EOSINOPHILS 0.0 0.0 - 0.4 K/UL  
 ABS. BASOPHILS 0.0 0.0 - 0.1 K/UL  
 ABS. IMM. GRANS. 0.0 0.00 - 0.04 K/UL  
 DF AUTOMATED METABOLIC PANEL, COMPREHENSIVE Collection Time: 20  5:49 AM  
Result Value Ref Range Sodium 138 136 - 145 mmol/L Potassium 4.3 3.5 - 5.1 mmol/L Chloride 106 97 - 108 mmol/L  
 CO2 24 21 - 32 mmol/L Anion gap 8 5 - 15 mmol/L Glucose 176 (H) 65 - 100 mg/dL BUN 51 (H) 6 - 20 MG/DL Creatinine 2.52 (H) 0.70 - 1.30 MG/DL  
 BUN/Creatinine ratio 20 12 - 20 GFR est AA 30 (L) >60 ml/min/1.73m2 GFR est non-AA 24 (L) >60 ml/min/1.73m2 Calcium 8.6 8.5 - 10.1 MG/DL Bilirubin, total 0.5 0.2 - 1.0 MG/DL  
 ALT (SGPT) 30 12 - 78 U/L  
 AST (SGOT) 89 (H) 15 - 37 U/L Alk. phosphatase 61 45 - 117 U/L Protein, total 6.9 6.4 - 8.2 g/dL Albumin 3.1 (L) 3.5 - 5.0 g/dL Globulin 3.8 2.0 - 4.0 g/dL A-G Ratio 0.8 (L) 1.1 - 2.2 LACTIC ACID Collection Time: 11/01/20  5:49 AM  
Result Value Ref Range Lactic acid 2.2 (HH) 0.4 - 2.0 MMOL/L  
SAMPLES BEING HELD Collection Time: 11/01/20  5:49 AM  
Result Value Ref Range SAMPLES BEING HELD  1 BLUE, 1 RED COMMENT Add-on orders for these samples will be processed based on acceptable specimen integrity and analyte stability, which may vary by analyte. SAMPLES BEING HELD Collection Time: 11/01/20  5:49 AM  
Result Value Ref Range SAMPLES BEING HELD  1 PAIR OF BC 1 BLUE, 1 LAV   
 COMMENT Add-on orders for these samples will be processed based on acceptable specimen integrity and analyte stability, which may vary by analyte. EKG, 12 LEAD, INITIAL Collection Time: 11/01/20  6:41 AM  
Result Value Ref Range Ventricular Rate 109 BPM  
 Atrial Rate 109 BPM  
 P-R Interval 112 ms QRS Duration 92 ms Q-T Interval 394 ms QTC Calculation (Bezet) 530 ms Calculated P Axis 60 degrees Calculated R Axis -54 degrees Calculated T Axis 15 degrees Diagnosis ** Poor data quality, interpretation may be adversely affected Sinus tachycardia Left axis deviation Nonspecific ST abnormality Prolonged QT When compared with ECG of 09-SEP-2019 13:35, 
premature atrial complexes are no longer present Questionable change in QRS duration CULTURE, BLOOD, PAIRED Collection Time: 11/01/20  6:50 AM  
 Specimen: Blood Result Value Ref Range Special Requests: NO SPECIAL REQUESTS Culture result: NO GROWTH AFTER 21 HOURS    
CULTURE, URINE Collection Time: 11/01/20  9:08 AM  
 Specimen: Cath Urine URINE Result Value Ref Range Special Requests: NO SPECIAL REQUESTS Culture result: No growth (<1,000 CFU/ML) SAMPLES BEING HELD Collection Time: 11/01/20  9:08 AM  
Result Value Ref Range SAMPLES BEING HELD 1UA   
 COMMENT Add-on orders for these samples will be processed based on acceptable specimen integrity and analyte stability, which may vary by analyte. URINALYSIS W/MICROSCOPIC Collection Time: 11/01/20  9:08 AM  
Result Value Ref Range Color YELLOW/STRAW Appearance CLOUDY (A) CLEAR Specific gravity 1.018 1.003 - 1.030    
 pH (UA) 5.0 5.0 - 8.0 Protein >300 (A) NEG mg/dL Glucose Negative NEG mg/dL Ketone TRACE (A) NEG mg/dL Bilirubin Negative NEG Blood LARGE (A) NEG Urobilinogen 0.2 0.2 - 1.0 EU/dL Nitrites Negative NEG Leukocyte Esterase Negative NEG    
 WBC 5-10 0 - 4 /hpf  
 RBC 20-50 0 - 5 /hpf Epithelial cells MANY (A) FEW /lpf Bacteria Negative NEG /hpf Amorphous Crystals 1+ (A) NEG  
LACTIC ACID Collection Time: 11/01/20  9:34 AM  
Result Value Ref Range Lactic acid 1.4 0.4 - 2.0 MMOL/L  
SARS-COV-2 Collection Time: 11/01/20 12:56 PM  
Result Value Ref Range Specimen source Nasopharyngeal    
 Specimen source Nasopharyngeal    
 COVID-19 rapid test Detected (AA) NOTD Specimen type NP Swab PROCALCITONIN Collection Time: 11/01/20  7:11 PM  
Result Value Ref Range Procalcitonin 0.22 ng/mL TYPE & SCREEN Collection Time: 11/01/20  7:11 PM  
Result Value Ref Range Crossmatch Expiration 11/04/2020,2359 ABO/Rh(D) O NEGATIVE Antibody screen NEG   
D DIMER Collection Time: 11/01/20  7:11 PM  
Result Value Ref Range D-dimer 1.31 (H) 0.00 - 0.65 mg/L FEU  
LD  Collection Time: 11/01/20  7:11 PM  
 Result Value Ref Range  (H) 85 - 241 U/L  
C REACTIVE PROTEIN, QT Collection Time: 11/02/20  2:38 AM  
Result Value Ref Range C-Reactive protein 18.60 (H) 0.00 - 0.60 mg/dL D DIMER Collection Time: 11/02/20  2:38 AM  
Result Value Ref Range D-dimer 1.19 (H) 0.00 - 0.65 mg/L FEU  
CONVALESCENT PLASMA, ALLOCATE Collection Time: 11/02/20 10:00 AM  
Result Value Ref Range Unit number [id number] Blood component type ConvPls,Th1   
 Unit division 00 Status of unit ALLOCATED METABOLIC PANEL, BASIC Collection Time: 11/02/20 11:31 AM  
Result Value Ref Range Sodium 144 136 - 145 mmol/L Potassium 4.8 3.5 - 5.1 mmol/L Chloride 111 (H) 97 - 108 mmol/L  
 CO2 21 21 - 32 mmol/L Anion gap 12 5 - 15 mmol/L Glucose 214 (H) 65 - 100 mg/dL BUN 61 (H) 6 - 20 MG/DL Creatinine 2.55 (H) 0.70 - 1.30 MG/DL  
 BUN/Creatinine ratio 24 (H) 12 - 20 GFR est AA 29 (L) >60 ml/min/1.73m2 GFR est non-AA 24 (L) >60 ml/min/1.73m2 Calcium 8.8 8.5 - 10.1 MG/DL  
POC EG7 Collection Time: 11/02/20  5:27 PM  
Result Value Ref Range Calcium, ionized (POC) 1.09 (L) 1.12 - 1.32 mmol/L  
 FIO2 (POC) 21 % pH (POC) 7.19 (LL) 7.35 - 7.45    
 pCO2 (POC) 24.8 (L) 35.0 - 45.0 MMHG  
 pO2 (POC) 33 (LL) 80 - 100 MMHG  
 HCO3 (POC) 9.3 (L) 22 - 26 MMOL/L Base deficit (POC) 19 mmol/L  
 sO2 (POC) 50 (L) 92 - 97 % Site LEFT BRACHIAL Device: ROOM AIR Allens test (POC) YES Specimen type (POC) ARTERIAL Total resp. rate 25 GLUCOSE, POC Collection Time: 11/02/20  6:07 PM  
Result Value Ref Range Glucose (POC) 148 (H) 65 - 100 mg/dL Performed by Elliott Alston TROPONIN I Collection Time: 11/02/20  6:14 PM  
Result Value Ref Range Troponin-I, Qt. HEMOLYZED,RECOLLECT REQUESTED <0.05 ng/mL METABOLIC PANEL, COMPREHENSIVE Collection Time: 11/02/20  6:14 PM  
Result Value Ref Range  Sodium NOTIFIED DODIE Robles@BMdr 136 - 145 mmol/L  
 Potassium NOTIFIED Apmetrix 3.5 - 5.1 mmol/L Chloride NOTIFIED Apmetrix 97 - 108 mmol/L  
 CO2 NOTIFIED Apmetrix 21 - 32 mmol/L Anion gap NOTIFIED Apmetrix 5 - 15 mmol/L Glucose NOTIFIED Apmetrix 50 - 100 mg/dL BUN NOTIFIED Apmetrix 6 - 20 MG/DL Creatinine NOTIFIED Apmetrix MG/DL  
 BUN/Creatinine ratio NOTIFIED Apmetrix 12 - 20 GFR est AA NOTIFIED Apmetrix >60 ml/min/1.73m2 GFR est non-AA NOTIFIED Apmetrix >60 ml/min/1.73m2 Calcium NOTIFIED Apmetrix 8.5 - 10.1 MG/DL Bilirubin, total NOTIFIED Apmetrix <1.0 MG/DL  
 ALT (SGPT) NOTIFIED Apmetrix 30 - 65 U/L  
 AST (SGOT) NOTIFIED Apmetrix 15 - 37 U/L Alk. phosphatase NOTIFIED Apmetrix 50 - 136 U/L Protein, total NOTIFIED Apmetrix 6.4 - 8.2 g/dL Albumin NOTIFIED Apmetrix 3.5 - 5.0 g/dL Globulin NOTIFIED Apmetrix 2.0 - 4.0 g/dL A-G Ratio NOTIFIED Apmetrix 1.1 - 2.2 PROTHROMBIN TIME + INR Collection Time: 11/02/20 10:12 PM  
Result Value Ref Range INR 1.5 (H) 0.9 - 1.1 Prothrombin time 15.1 (H) 9.0 - 11.1 sec PTT Collection Time: 11/02/20 10:12 PM  
Result Value Ref Range aPTT 39.8 (H) 22.1 - 32.0 sec  
 aPTT, therapeutic range     58.0 - 55.6 SECS  
METABOLIC PANEL, BASIC Collection Time: 11/02/20 10:12 PM  
Result Value Ref Range Sodium 146 (H) 136 - 145 mmol/L Potassium 6.7 (HH) 3.5 - 5.1 mmol/L Chloride 111 (H) 97 - 108 mmol/L  
 CO2 12 (LL) 21 - 32 mmol/L Anion gap 23 (H) 5 - 15 mmol/L Glucose 156 (H) 65 - 100 mg/dL BUN 73 (H) 6 - 20 MG/DL Creatinine 3.74 (H) 0.70 - 1.30 MG/DL  
 BUN/Creatinine ratio 20 12 - 20 GFR est AA 19 (L) >60 ml/min/1.73m2 GFR est non-AA 16 (L) >60 ml/min/1.73m2 Calcium 8.1 (L) 8.5 - 10.1 MG/DL MAGNESIUM  Collection Time: 11/02/20 10:12 PM  
 Result Value Ref Range Magnesium 3.0 (H) 1.6 - 2.4 mg/dL PHOSPHORUS Collection Time: 11/02/20 10:12 PM  
Result Value Ref Range Phosphorus 9.8 (H) 2.6 - 4.7 MG/DL  
LACTIC ACID Collection Time: 11/02/20 10:12 PM  
Result Value Ref Range Lactic acid 14.6 (HH) 0.4 - 2.0 MMOL/L  
FIBRINOGEN Collection Time: 11/02/20 10:12 PM  
Result Value Ref Range Fibrinogen 467 200 - 475 mg/dL CBC WITH AUTOMATED DIFF Collection Time: 11/02/20 10:12 PM  
Result Value Ref Range WBC 11.3 (H) 4.1 - 11.1 K/uL  
 RBC 4.11 4.10 - 5.70 M/uL  
 HGB 11.6 (L) 12.1 - 17.0 g/dL HCT 39.8 36.6 - 50.3 % MCV 96.8 80.0 - 99.0 FL  
 MCH 28.2 26.0 - 34.0 PG  
 MCHC 29.1 (L) 30.0 - 36.5 g/dL  
 RDW 14.0 11.5 - 14.5 % PLATELET 180 373 - 226 K/uL MPV 10.7 8.9 - 12.9 FL  
 NRBC 0.2 (H) 0  WBC ABSOLUTE NRBC 0.02 (H) 0.00 - 0.01 K/uL NEUTROPHILS 86 (H) 32 - 75 % LYMPHOCYTES 10 (L) 12 - 49 % MONOCYTES 3 (L) 5 - 13 % EOSINOPHILS 0 0 - 7 % BASOPHILS 0 0 - 1 % IMMATURE GRANULOCYTES 2 (H) 0.0 - 0.5 % ABS. NEUTROPHILS 9.7 (H) 1.8 - 8.0 K/UL  
 ABS. LYMPHOCYTES 1.1 0.8 - 3.5 K/UL  
 ABS. MONOCYTES 0.3 0.0 - 1.0 K/UL  
 ABS. EOSINOPHILS 0.0 0.0 - 0.4 K/UL  
 ABS. BASOPHILS 0.0 0.0 - 0.1 K/UL  
 ABS. IMM. GRANS. 0.2 (H) 0.00 - 0.04 K/UL  
 DF AUTOMATED    
TROPONIN I Collection Time: 11/02/20 10:12 PM  
Result Value Ref Range Troponin-I, Qt. 1.38 (H) <0.05 ng/mL Discussed with:   
Sirena Furnace, NP Thank you so much to allow us to participate in this patient's care. We will follow. 
: Cheryle Ano, MD 
11/2/2020 [facility]: 
[email or web address] [place] office: 
[address], [address] [place], [address] [place] [address] Phone: [phone] Fax :     [phone] [facility] office: 
[address] [facility], [address] [place] [address] Pkw[address] Phone - [phone] Fax - [phone]

## 2020-11-03 NOTE — ACP (ADVANCE CARE PLANNING)
Adv Care Plan Discussion with patient's daughter Radha Connors. She tells me the AMD on chart from 2017 is OLD. There is a new AMD, completed in January 2020 which Marko Thompson has at home. Marko Thompson is medical and financial POA. This was changed when Marko Thompson became primary caregiver for patient and his wife. Patient's wife Elodia Hamman) also has advanced dementia.

## 2020-11-04 LAB
ARTERIAL PATENCY WRIST A: ABNORMAL
BASE EXCESS BLDA CALC-SCNC: 2.1 MMOL/L
BDY SITE: ABNORMAL
BLD PROD TYP BPU: NORMAL
BPU ID: NORMAL
EPAP/CPAP/PEEP, PAPEEP: 12
FIO2 ON VENT: 100 %
GAS FLOW.O2 SETTING OXYMISER: 28 L/MIN
HCO3 BLDA-SCNC: 27 MMOL/L (ref 22–26)
IL6 SERPL-MCNC: 101 PG/ML (ref 0–13)
PCO2 BLDA: 40 MMHG (ref 35–45)
PH BLDA: 7.43 [PH] (ref 7.35–7.45)
PO2 BLDA: 312 MMHG (ref 80–100)
SAO2 % BLD: 100 % (ref 92–97)
SAO2% DEVICE SAO2% SENSOR NAME: ABNORMAL
SPECIMEN SITE: ABNORMAL
STATUS OF UNIT,%ST: NORMAL
UNIT DIVISION, %UDIV: 0
VENTILATION MODE VENT: ABNORMAL
VT SETTING VENT: 440 ML

## 2020-11-06 LAB
BACTERIA SPEC CULT: NORMAL
SERVICE CMNT-IMP: NORMAL

## 2024-09-25 NOTE — PROGRESS NOTES
Chief Complaint   Patient presents with    Diabetes     1. Have you been to the ER, urgent care clinic since your last visit? Hospitalized since your last visit? No    2. Have you seen or consulted any other health care providers outside of the [address] [place] [address] since your last visit? Include any pap smears or colon screening. No     I have reviewed Health Maintenance with the patient and updated. Advance Care plan on file. The patient was counseled on the dangers of tobacco use, and Patient is a non smoker. Reviewed strategies to maximize success, including Continue not to smoke. no